# Patient Record
Sex: FEMALE | Race: WHITE | HISPANIC OR LATINO | Employment: UNEMPLOYED | URBAN - METROPOLITAN AREA
[De-identification: names, ages, dates, MRNs, and addresses within clinical notes are randomized per-mention and may not be internally consistent; named-entity substitution may affect disease eponyms.]

---

## 2021-08-25 ENCOUNTER — APPOINTMENT (OUTPATIENT)
Dept: RADIOLOGY | Facility: MEDICAL CENTER | Age: 51
DRG: 064 | End: 2021-08-25
Attending: GENERAL PRACTICE
Payer: MEDICAID

## 2021-08-25 ENCOUNTER — HOSPITAL ENCOUNTER (INPATIENT)
Facility: MEDICAL CENTER | Age: 51
LOS: 5 days | DRG: 064 | End: 2021-08-30
Attending: EMERGENCY MEDICINE | Admitting: INTERNAL MEDICINE
Payer: MEDICAID

## 2021-08-25 ENCOUNTER — APPOINTMENT (OUTPATIENT)
Dept: RADIOLOGY | Facility: MEDICAL CENTER | Age: 51
DRG: 064 | End: 2021-08-25
Attending: INTERNAL MEDICINE
Payer: MEDICAID

## 2021-08-25 ENCOUNTER — APPOINTMENT (OUTPATIENT)
Dept: RADIOLOGY | Facility: MEDICAL CENTER | Age: 51
DRG: 064 | End: 2021-08-25
Attending: EMERGENCY MEDICINE
Payer: MEDICAID

## 2021-08-25 ENCOUNTER — HOSPITAL ENCOUNTER (INPATIENT)
Facility: REHABILITATION | Age: 51
End: 2021-08-25
Attending: PHYSICAL MEDICINE & REHABILITATION | Admitting: PHYSICAL MEDICINE & REHABILITATION
Payer: MEDICAID

## 2021-08-25 ENCOUNTER — APPOINTMENT (OUTPATIENT)
Dept: CARDIOLOGY | Facility: MEDICAL CENTER | Age: 51
DRG: 064 | End: 2021-08-25
Attending: INTERNAL MEDICINE
Payer: MEDICAID

## 2021-08-25 ENCOUNTER — HOSPITAL ENCOUNTER (OUTPATIENT)
Dept: RADIOLOGY | Facility: MEDICAL CENTER | Age: 51
End: 2021-08-25
Payer: MEDICAID

## 2021-08-25 DIAGNOSIS — I61.1 NONTRAUMATIC CORTICAL HEMORRHAGE OF LEFT CEREBRAL HEMISPHERE (HCC): ICD-10-CM

## 2021-08-25 DIAGNOSIS — I61.2 NONTRAUMATIC HEMORRHAGE OF CEREBRAL HEMISPHERE, UNSPECIFIED LATERALITY (HCC): ICD-10-CM

## 2021-08-25 DIAGNOSIS — I16.1 HYPERTENSIVE EMERGENCY: ICD-10-CM

## 2021-08-25 DIAGNOSIS — J96.01 ACUTE RESPIRATORY FAILURE WITH HYPOXIA (HCC): ICD-10-CM

## 2021-08-25 PROBLEM — J81.0 ACUTE PULMONARY EDEMA (HCC): Status: ACTIVE | Noted: 2021-08-25

## 2021-08-25 PROBLEM — N18.9 CHRONIC KIDNEY DISEASE: Status: ACTIVE | Noted: 2021-08-25

## 2021-08-25 PROBLEM — F19.90 DRUG USE: Status: ACTIVE | Noted: 2021-08-25

## 2021-08-25 LAB
AMPHET UR QL SCN: POSITIVE
BARBITURATES UR QL SCN: NEGATIVE
BASE EXCESS BLDA CALC-SCNC: 2 MMOL/L (ref -4–3)
BENZODIAZ UR QL SCN: NEGATIVE
BODY TEMPERATURE: ABNORMAL DEGREES
BREATHS SETTING VENT: 20
BZE UR QL SCN: NEGATIVE
CANNABINOIDS UR QL SCN: POSITIVE
CFT BLD TEG: 5.2 MIN (ref 4.6–9.1)
CFT P HPASE BLD TEG: 4.7 MIN (ref 4.3–8.3)
CHOLEST SERPL-MCNC: 181 MG/DL (ref 100–199)
CLOT ANGLE BLD TEG: 76.3 DEGREES (ref 63–78)
CLOT LYSIS 30M P MA LENFR BLD TEG: 0.5 % (ref 0–2.6)
CO2 BLDA-SCNC: 31 MMOL/L (ref 20–33)
CT.EXTRINSIC BLD ROTEM: 1.1 MIN (ref 0.8–2.1)
DELSYS IDSYS: ABNORMAL
GLUCOSE BLD-MCNC: 102 MG/DL (ref 65–99)
GLUCOSE BLD-MCNC: 137 MG/DL (ref 65–99)
GLUCOSE BLD-MCNC: 98 MG/DL (ref 65–99)
HCO3 BLDA-SCNC: 29.7 MMOL/L (ref 17–25)
HDLC SERPL-MCNC: 82 MG/DL
HOROWITZ INDEX BLDA+IHG-RTO: 320 MM[HG]
LDLC SERPL CALC-MCNC: 84 MG/DL
LV EJECT FRACT  99904: 70
LV EJECT FRACT MOD 2C 99903: 65.3
LV EJECT FRACT MOD 4C 99902: 54.17
LV EJECT FRACT MOD BP 99901: 61.15
MCF BLD TEG: 60.6 MM (ref 52–69)
MCF.PLATELET INHIB BLD ROTEM: 20.7 MM (ref 15–32)
METHADONE UR QL SCN: NEGATIVE
MODE IMODE: ABNORMAL
O2/TOTAL GAS SETTING VFR VENT: 100 %
OPIATES UR QL SCN: NEGATIVE
OXYCODONE UR QL SCN: NEGATIVE
PA AA BLD-ACNC: 97.5 % (ref 0–11)
PA ADP BLD-ACNC: 36.8 % (ref 0–17)
PCO2 BLDA: 56.1 MMHG (ref 26–37)
PCP UR QL SCN: NEGATIVE
PEEP END EXPIRATORY PRESSURE IPEEP: 8 CMH20
PH BLDA: 7.33 [PH] (ref 7.4–7.5)
PO2 BLDA: 320 MMHG (ref 64–87)
PROPOXYPH UR QL SCN: NEGATIVE
SAO2 % BLDA: 100 % (ref 93–99)
SARS-COV+SARS-COV-2 AG RESP QL IA.RAPID: NOTDETECTED
SODIUM SERPL-SCNC: 138 MMOL/L (ref 135–145)
SODIUM SERPL-SCNC: 138 MMOL/L (ref 135–145)
SODIUM SERPL-SCNC: 139 MMOL/L (ref 135–145)
SPECIMEN DRAWN FROM PATIENT: ABNORMAL
SPECIMEN SOURCE: NORMAL
TEG ALGORITHM TGALG: ABNORMAL
TIDAL VOLUME IVT: 350 ML
TRIGL SERPL-MCNC: 77 MG/DL (ref 0–149)

## 2021-08-25 PROCEDURE — A9270 NON-COVERED ITEM OR SERVICE: HCPCS | Performed by: INTERNAL MEDICINE

## 2021-08-25 PROCEDURE — 51702 INSERT TEMP BLADDER CATH: CPT

## 2021-08-25 PROCEDURE — 95816 EEG AWAKE AND DROWSY: CPT | Performed by: PSYCHIATRY & NEUROLOGY

## 2021-08-25 PROCEDURE — 93306 TTE W/DOPPLER COMPLETE: CPT | Mod: 26 | Performed by: INTERNAL MEDICINE

## 2021-08-25 PROCEDURE — 85384 FIBRINOGEN ACTIVITY: CPT

## 2021-08-25 PROCEDURE — 93306 TTE W/DOPPLER COMPLETE: CPT

## 2021-08-25 PROCEDURE — 770022 HCHG ROOM/CARE - ICU (200)

## 2021-08-25 PROCEDURE — 85576 BLOOD PLATELET AGGREGATION: CPT

## 2021-08-25 PROCEDURE — 303105 HCHG CATHETER EXTRA

## 2021-08-25 PROCEDURE — 36620 INSERTION CATHETER ARTERY: CPT | Performed by: NURSE PRACTITIONER

## 2021-08-25 PROCEDURE — 03HY32Z INSERTION OF MONITORING DEVICE INTO UPPER ARTERY, PERCUTANEOUS APPROACH: ICD-10-PCS | Performed by: INTERNAL MEDICINE

## 2021-08-25 PROCEDURE — 99291 CRITICAL CARE FIRST HOUR: CPT | Performed by: INTERNAL MEDICINE

## 2021-08-25 PROCEDURE — 80061 LIPID PANEL: CPT

## 2021-08-25 PROCEDURE — 99291 CRITICAL CARE FIRST HOUR: CPT

## 2021-08-25 PROCEDURE — C1751 CATH, INF, PER/CENT/MIDLINE: HCPCS | Performed by: INTERNAL MEDICINE

## 2021-08-25 PROCEDURE — 304538 HCHG NG TUBE

## 2021-08-25 PROCEDURE — 95816 EEG AWAKE AND DROWSY: CPT | Mod: 26 | Performed by: PSYCHIATRY & NEUROLOGY

## 2021-08-25 PROCEDURE — 94799 UNLISTED PULMONARY SVC/PX: CPT

## 2021-08-25 PROCEDURE — 82962 GLUCOSE BLOOD TEST: CPT

## 2021-08-25 PROCEDURE — C1751 CATH, INF, PER/CENT/MIDLINE: HCPCS

## 2021-08-25 PROCEDURE — 700111 HCHG RX REV CODE 636 W/ 250 OVERRIDE (IP): Performed by: EMERGENCY MEDICINE

## 2021-08-25 PROCEDURE — 99223 1ST HOSP IP/OBS HIGH 75: CPT | Performed by: PHYSICAL MEDICINE & REHABILITATION

## 2021-08-25 PROCEDURE — 87426 SARSCOV CORONAVIRUS AG IA: CPT

## 2021-08-25 PROCEDURE — 700101 HCHG RX REV CODE 250: Performed by: INTERNAL MEDICINE

## 2021-08-25 PROCEDURE — 700102 HCHG RX REV CODE 250 W/ 637 OVERRIDE(OP): Performed by: INTERNAL MEDICINE

## 2021-08-25 PROCEDURE — 82803 BLOOD GASES ANY COMBINATION: CPT

## 2021-08-25 PROCEDURE — 70553 MRI BRAIN STEM W/O & W/DYE: CPT

## 2021-08-25 PROCEDURE — 31500 INSERT EMERGENCY AIRWAY: CPT

## 2021-08-25 PROCEDURE — 71045 X-RAY EXAM CHEST 1 VIEW: CPT

## 2021-08-25 PROCEDURE — 700105 HCHG RX REV CODE 258: Performed by: EMERGENCY MEDICINE

## 2021-08-25 PROCEDURE — 36556 INSERT NON-TUNNEL CV CATH: CPT

## 2021-08-25 PROCEDURE — 700111 HCHG RX REV CODE 636 W/ 250 OVERRIDE (IP)

## 2021-08-25 PROCEDURE — 700105 HCHG RX REV CODE 258: Performed by: INTERNAL MEDICINE

## 2021-08-25 PROCEDURE — 302214 INTUBATION BOX: Performed by: INTERNAL MEDICINE

## 2021-08-25 PROCEDURE — 5A1935Z RESPIRATORY VENTILATION, LESS THAN 24 CONSECUTIVE HOURS: ICD-10-PCS | Performed by: EMERGENCY MEDICINE

## 2021-08-25 PROCEDURE — 700101 HCHG RX REV CODE 250: Performed by: EMERGENCY MEDICINE

## 2021-08-25 PROCEDURE — 99292 CRITICAL CARE ADDL 30 MIN: CPT | Mod: 25,GC | Performed by: INTERNAL MEDICINE

## 2021-08-25 PROCEDURE — 84295 ASSAY OF SERUM SODIUM: CPT | Mod: 91

## 2021-08-25 PROCEDURE — 96365 THER/PROPH/DIAG IV INF INIT: CPT

## 2021-08-25 PROCEDURE — 80307 DRUG TEST PRSMV CHEM ANLYZR: CPT

## 2021-08-25 PROCEDURE — 70450 CT HEAD/BRAIN W/O DYE: CPT

## 2021-08-25 PROCEDURE — 700117 HCHG RX CONTRAST REV CODE 255: Performed by: GENERAL PRACTICE

## 2021-08-25 PROCEDURE — 36600 WITHDRAWAL OF ARTERIAL BLOOD: CPT

## 2021-08-25 PROCEDURE — 94002 VENT MGMT INPAT INIT DAY: CPT

## 2021-08-25 PROCEDURE — A9576 INJ PROHANCE MULTIPACK: HCPCS | Performed by: GENERAL PRACTICE

## 2021-08-25 PROCEDURE — 36620 INSERTION CATHETER ARTERY: CPT

## 2021-08-25 PROCEDURE — 85347 COAGULATION TIME ACTIVATED: CPT

## 2021-08-25 PROCEDURE — 0BH18EZ INSERTION OF ENDOTRACHEAL AIRWAY INTO TRACHEA, VIA NATURAL OR ARTIFICIAL OPENING ENDOSCOPIC: ICD-10-PCS | Performed by: EMERGENCY MEDICINE

## 2021-08-25 PROCEDURE — 700111 HCHG RX REV CODE 636 W/ 250 OVERRIDE (IP): Performed by: INTERNAL MEDICINE

## 2021-08-25 RX ORDER — ACETAMINOPHEN 325 MG/1
650 TABLET ORAL EVERY 4 HOURS PRN
Status: DISCONTINUED | OUTPATIENT
Start: 2021-08-25 | End: 2021-08-28

## 2021-08-25 RX ORDER — HYDRALAZINE HYDROCHLORIDE 20 MG/ML
10 INJECTION INTRAMUSCULAR; INTRAVENOUS EVERY 4 HOURS PRN
Status: DISCONTINUED | OUTPATIENT
Start: 2021-08-25 | End: 2021-08-30 | Stop reason: HOSPADM

## 2021-08-25 RX ORDER — ONDANSETRON 2 MG/ML
4 INJECTION INTRAMUSCULAR; INTRAVENOUS EVERY 4 HOURS PRN
Status: DISCONTINUED | OUTPATIENT
Start: 2021-08-25 | End: 2021-08-28

## 2021-08-25 RX ORDER — POLYETHYLENE GLYCOL 3350 17 G/17G
1 POWDER, FOR SOLUTION ORAL
Status: DISCONTINUED | OUTPATIENT
Start: 2021-08-25 | End: 2021-08-30

## 2021-08-25 RX ORDER — PROMETHAZINE HYDROCHLORIDE 25 MG/1
12.5-25 TABLET ORAL EVERY 4 HOURS PRN
Status: DISCONTINUED | OUTPATIENT
Start: 2021-08-25 | End: 2021-08-25

## 2021-08-25 RX ORDER — FAMOTIDINE 20 MG/1
20 TABLET, FILM COATED ORAL EVERY 12 HOURS
Status: DISCONTINUED | OUTPATIENT
Start: 2021-08-25 | End: 2021-08-27

## 2021-08-25 RX ORDER — PROCHLORPERAZINE EDISYLATE 5 MG/ML
5-10 INJECTION INTRAMUSCULAR; INTRAVENOUS EVERY 4 HOURS PRN
Status: DISCONTINUED | OUTPATIENT
Start: 2021-08-25 | End: 2021-08-30 | Stop reason: HOSPADM

## 2021-08-25 RX ORDER — BISACODYL 10 MG
10 SUPPOSITORY, RECTAL RECTAL
Status: DISCONTINUED | OUTPATIENT
Start: 2021-08-25 | End: 2021-08-30

## 2021-08-25 RX ORDER — ONDANSETRON 4 MG/1
4 TABLET, ORALLY DISINTEGRATING ORAL EVERY 4 HOURS PRN
Status: DISCONTINUED | OUTPATIENT
Start: 2021-08-25 | End: 2021-08-28

## 2021-08-25 RX ORDER — AMOXICILLIN 250 MG
2 CAPSULE ORAL 2 TIMES DAILY
Status: DISCONTINUED | OUTPATIENT
Start: 2021-08-25 | End: 2021-08-30

## 2021-08-25 RX ORDER — SODIUM CHLORIDE 9 MG/ML
INJECTION, SOLUTION INTRAVENOUS CONTINUOUS
Status: DISCONTINUED | OUTPATIENT
Start: 2021-08-25 | End: 2021-08-26

## 2021-08-25 RX ORDER — ACETAMINOPHEN 650 MG/1
650 SUPPOSITORY RECTAL EVERY 4 HOURS PRN
Status: DISCONTINUED | OUTPATIENT
Start: 2021-08-25 | End: 2021-08-28

## 2021-08-25 RX ORDER — PROMETHAZINE HYDROCHLORIDE 25 MG/1
12.5-25 TABLET ORAL EVERY 4 HOURS PRN
Status: DISCONTINUED | OUTPATIENT
Start: 2021-08-25 | End: 2021-08-28

## 2021-08-25 RX ORDER — ENALAPRILAT 1.25 MG/ML
1.25 INJECTION INTRAVENOUS EVERY 6 HOURS PRN
Status: DISCONTINUED | OUTPATIENT
Start: 2021-08-25 | End: 2021-08-27

## 2021-08-25 RX ORDER — DEXTROSE MONOHYDRATE 25 G/50ML
50 INJECTION, SOLUTION INTRAVENOUS
Status: DISCONTINUED | OUTPATIENT
Start: 2021-08-25 | End: 2021-08-26

## 2021-08-25 RX ORDER — LABETALOL HYDROCHLORIDE 5 MG/ML
10 INJECTION, SOLUTION INTRAVENOUS EVERY 4 HOURS PRN
Status: DISCONTINUED | OUTPATIENT
Start: 2021-08-25 | End: 2021-08-30 | Stop reason: HOSPADM

## 2021-08-25 RX ORDER — LORAZEPAM 2 MG/ML
2 INJECTION INTRAMUSCULAR
Status: DISCONTINUED | OUTPATIENT
Start: 2021-08-25 | End: 2021-08-30 | Stop reason: HOSPADM

## 2021-08-25 RX ORDER — PROMETHAZINE HYDROCHLORIDE 25 MG/1
12.5-25 SUPPOSITORY RECTAL EVERY 4 HOURS PRN
Status: DISCONTINUED | OUTPATIENT
Start: 2021-08-25 | End: 2021-08-30 | Stop reason: HOSPADM

## 2021-08-25 RX ADMIN — LABETALOL HYDROCHLORIDE 10 MG: 5 INJECTION INTRAVENOUS at 09:35

## 2021-08-25 RX ADMIN — GADOTERIDOL 17 ML: 279.3 INJECTION, SOLUTION INTRAVENOUS at 21:19

## 2021-08-25 RX ADMIN — FENTANYL CITRATE 100 MCG: 50 INJECTION INTRAMUSCULAR; INTRAVENOUS at 04:35

## 2021-08-25 RX ADMIN — PROPOFOL 60 MCG/KG/MIN: 10 INJECTION, EMULSION INTRAVENOUS at 09:37

## 2021-08-25 RX ADMIN — Medication 100 MCG/HR: at 20:17

## 2021-08-25 RX ADMIN — SODIUM CHLORIDE: 9 INJECTION, SOLUTION INTRAVENOUS at 17:05

## 2021-08-25 RX ADMIN — PROPOFOL 70 MCG/KG/MIN: 10 INJECTION, EMULSION INTRAVENOUS at 03:00

## 2021-08-25 RX ADMIN — PROPOFOL 80 MCG/KG/MIN: 10 INJECTION, EMULSION INTRAVENOUS at 18:36

## 2021-08-25 RX ADMIN — SODIUM CHLORIDE 7.5 MG/HR: 9 INJECTION, SOLUTION INTRAVENOUS at 19:36

## 2021-08-25 RX ADMIN — Medication 100 MCG/HR: at 17:06

## 2021-08-25 RX ADMIN — PROPOFOL 70 MCG/KG/MIN: 10 INJECTION, EMULSION INTRAVENOUS at 06:30

## 2021-08-25 RX ADMIN — SODIUM CHLORIDE 5 MG/HR: 9 INJECTION, SOLUTION INTRAVENOUS at 15:13

## 2021-08-25 RX ADMIN — FENTANYL CITRATE 200 MCG: 50 INJECTION, SOLUTION INTRAMUSCULAR; INTRAVENOUS at 14:27

## 2021-08-25 RX ADMIN — FENTANYL CITRATE 100 MCG: 50 INJECTION INTRAMUSCULAR; INTRAVENOUS at 11:18

## 2021-08-25 RX ADMIN — SODIUM CHLORIDE: 9 INJECTION, SOLUTION INTRAVENOUS at 04:43

## 2021-08-25 RX ADMIN — PROPOFOL 60 MCG/KG/MIN: 10 INJECTION, EMULSION INTRAVENOUS at 04:43

## 2021-08-25 RX ADMIN — SODIUM CHLORIDE 5 MG/HR: 9 INJECTION, SOLUTION INTRAVENOUS at 05:07

## 2021-08-25 RX ADMIN — SODIUM CHLORIDE 15 MG/HR: 9 INJECTION, SOLUTION INTRAVENOUS at 01:30

## 2021-08-25 RX ADMIN — FENTANYL CITRATE 100 MCG: 50 INJECTION INTRAMUSCULAR; INTRAVENOUS at 02:52

## 2021-08-25 RX ADMIN — PROPOFOL 80 MCG/KG/MIN: 10 INJECTION, EMULSION INTRAVENOUS at 02:55

## 2021-08-25 RX ADMIN — HYDRALAZINE HYDROCHLORIDE 10 MG: 20 INJECTION INTRAMUSCULAR; INTRAVENOUS at 10:31

## 2021-08-25 RX ADMIN — FAMOTIDINE 20 MG: 20 TABLET ORAL at 17:08

## 2021-08-25 RX ADMIN — DOCUSATE SODIUM 50 MG AND SENNOSIDES 8.6 MG 2 TABLET: 8.6; 5 TABLET, FILM COATED ORAL at 17:08

## 2021-08-25 RX ADMIN — SODIUM CHLORIDE 5 MG/HR: 9 INJECTION, SOLUTION INTRAVENOUS at 10:40

## 2021-08-25 RX ADMIN — FENTANYL CITRATE 100 MCG: 50 INJECTION INTRAMUSCULAR; INTRAVENOUS at 05:51

## 2021-08-25 RX ADMIN — PROPOFOL 70 MCG/KG/MIN: 10 INJECTION, EMULSION INTRAVENOUS at 12:59

## 2021-08-25 RX ADMIN — PROPOFOL 80 MCG/KG/MIN: 10 INJECTION, EMULSION INTRAVENOUS at 22:13

## 2021-08-25 RX ADMIN — FAMOTIDINE 20 MG: 10 INJECTION INTRAVENOUS at 05:04

## 2021-08-25 RX ADMIN — PROPOFOL 80 MCG/KG/MIN: 10 INJECTION, EMULSION INTRAVENOUS at 15:39

## 2021-08-25 RX ADMIN — PROPOFOL 70 MCG/KG/MIN: 10 INJECTION, EMULSION INTRAVENOUS at 03:38

## 2021-08-25 RX ADMIN — FENTANYL CITRATE 100 MCG: 50 INJECTION INTRAMUSCULAR; INTRAVENOUS at 20:30

## 2021-08-25 RX ADMIN — PROPOFOL 80 MCG/KG/MIN: 10 INJECTION, EMULSION INTRAVENOUS at 20:17

## 2021-08-25 ASSESSMENT — PAIN DESCRIPTION - PAIN TYPE
TYPE: ACUTE PAIN

## 2021-08-25 NOTE — ED NOTES
1251- Time out was called  1251- 100 of propofol was given IV in RAC  1251- 80 of Chuck was given in the RAC. RT at bedside to BVM  1252- 7.5 ET was placed, 23 @ teeth  185/93-BP, 99% BVM, 91-HR  1254- vented on 100% O2  1255- 173/84, 86-HR, 100% vent  1257- 20 of propofol was given   1259-- NG was placed   0120-- propofol was adjusted to 30  0125- nicardipine drip was restarted at 15mg/hr    0111- Time out was called to place Central line   0126-7Fr 20cm central line was palced

## 2021-08-25 NOTE — PROGRESS NOTES
Neurosurgery Progress Note    Subjective:  No acute events overnight.  Deya, daughter, at bedside.  Tech for cardiogram just completed study and in room.    Seen in conjunction with Dr Chanel    Exam:  Intubated. Sedated. Propofol 70mcg/kg/min  Nicardipine 7.5mg/hr-currently paused  2mm PERRL  Pt does not respond to sternal rub. Heavily sedated.         BP  Min: 92/54  Max: 182/97  Pulse  Av.2  Min: 67  Max: 90  Resp  Av.7  Min: 24  Max: 34  Temp  Av.8 °C (96.5 °F)  Min: 35.6 °C (96 °F)  Max: 36.1 °C (97 °F)  Monitored Temp 2  Av.5 °C (95.9 °F)  Min: 35.4 °C (95.7 °F)  Max: 35.6 °C (96.1 °F)  SpO2  Av.7 %  Min: 96 %  Max: 100 %    No data recorded        Recent Labs     219 21  0818   SODIUM 138 138         Recent Labs     21   REACTMIN 5.2   CLOTKINET 1.1   CLOTANGL 76.3   MAXCLOTS 60.6   [id number] 0.5   PRCINADP 36.8*   PRCINAA 97.5*       Intake/Output                       21 - 21 0659 (Not Admitted) 21 - 21 0659      Total  Total                 Intake    I.V.  --  359.7 359.7  390.1  -- 390.1    Cardene Volume -- 322.5 322.5 -- -- --    Propofol Volume -- 37.2 37.2 127.4 -- 127.4    Volume (mL) (NS infusion) -- 0 0 262.7 -- 262.7    NG/GT  --  0 0  --  -- --    Intake (mL) (Enteral Tube 21 Cortrak - Gastric Right nare) -- 0 0 -- -- --    Total Intake -- 359.7 359.7 390.1 -- 390.1       Output    Drains  --  0 0  --  -- --    Residual Amount (mL) (Discarded) (Enteral Tube 21 Cortrak - Gastric Right nare) -- 0 0 -- -- --    Other  --  -- --  100  -- 100    Other -- -- -- 100 -- 100    Emesis/NG output  --  0 0  --  -- --    Output (mL) (Enteral Tube 21 Cortrak - Gastric Right nare) -- 0 0 -- -- --    Total Output -- 0 0 100 -- 100       Net I/O     -- 359.7 359.7 290.1 -- 290.1            Intake/Output Summary (Last 24 hours) at 2021 0953  Last data filed at  8/25/2021 0800  Gross per 24 hour   Intake 749.85 ml   Output 100 ml   Net 649.85 ml            • famotidine  20 mg Q12HRS    Or   • famotidine  20 mg Q12HRS   • senna-docusate  2 Tablet BID    And   • polyethylene glycol/lytes  1 Packet QDAY PRN    And   • magnesium hydroxide  30 mL QDAY PRN    And   • bisacodyl  10 mg QDAY PRN   • lidocaine  2 mL Q30 MIN PRN   • Pharmacy  1 Each PHARMACY TO DOSE   • fentaNYL  100 mcg Q15 MIN PRN    And   • fentaNYL  200 mcg Q15 MIN PRN    And   • fentaNYL   Continuous    And   • propofol  0-80 mcg/kg/min Continuous   • insulin regular  1-6 Units Q6HRS    And   • glucose  16 g Q15 MIN PRN    And   • dextrose 50%  50 mL Q15 MIN PRN   • Respiratory Therapy Consult   Continuous RT   • NS   Continuous   • LORazepam  2 mg Q5 MIN PRN   • acetaminophen  650 mg Q4HRS PRN    Or   • acetaminophen  650 mg Q4HRS PRN   • ondansetron  4 mg Q4HRS PRN    Or   • ondansetron  4 mg Q4HRS PRN   • MD Alert...Adult ICU Electrolyte Replacement per Pharmacy   PHARMACY TO DOSE   • niCARdipine infusion  0-15 mg/hr Continuous   • labetalol  10 mg Q4HRS PRN   • hydrALAZINE  10 mg Q4HRS PRN   • enalaprilat  1.25 mg Q6HRS PRN   • promethazine  12.5-25 mg Q4HRS PRN   • promethazine  12.5-25 mg Q4HRS PRN   • prochlorperazine  5-10 mg Q4HRS PRN       Assessment and Plan:  Hospital day #1  POD #na/  Prophylactic anticoagulation: no         Start date/time: TBD    Plan:  MRI brain w/wo pending  EEG pending  Dr Chanel discussed with Dr Bella  Wean vent as tolerated  No ASA or anticoagulants  Continue Keppra  Keep SBP less than 140

## 2021-08-25 NOTE — CONSULTS
Physical Medicine and Rehabilitation Consultation              Date of initial consultation: 8/25/2021  Requesting provider: Jeremy Gonda, MD   Consulting provider: Mi Issa D.O.  Reason for consultation: assess for acute inpatient rehab appropriateness  LOS: 0 Day(s)    Chief complaint: Acute onset of slurred speech and right-sided facial droop    HPI: The patient is a 51 y.o.  female with a past medical history of hypertension and CKD;  who presented on 8/25/2021 12:40 AM as a transfer from [facility] emergency department for an intracranial hemorrhage.  Per documentation, [facility] with a sudden onset of slurred speech, right-sided facial droop and decreased sensation of the right upper and lower extremities that started approximately 1 hour prior to arrival to the emergency department.  At time of evaluation in the emergency department patient is found to have an NIH SS 6 and code stroke was called.  She is also noted to have a blood pressure of 250/130 and UDS positive for amphetamines and THC.  A CT of the head was obtained which revealed the patient had a left parietal intraparenchymal hemorrhage and was transferred to [facility] for higher level of care.  Per documentation patient was not on any blood thinners in the outpatient setting and did not have any preceding trauma.  In the emergency department at [facility] patient was able to protect her airway, patient was subsequently intubated and started on a nicardipine drip.  Neurosurgery was consulted, patient was started on seizure prophylaxis, EEG was ordered, and MRI brain is pending.  Neurology has been consulted.  Patient remains intubated and sedated, therapy eval's have not been completed at this time.    Full review of systems unable to be obtained, patient is intubated and sedated.    Social Hx:  Unable to to determine patient's living situation, patient is intubated  and sedated, daughter is not at bedside   unknown DANILO  At prior level of function patient was independent with mobility and ADLs       Tobacco: Per records patient has a history of tobacco use  Alcohol: Unknown at this time  Drugs: Positive amphetamine and THC    THERAPY:  Restrictions: Currently on bedrest  PT: Functional mobility   PT eval pending    OT: ADLs  OT eval pending    SLP:   SLP eval pending    IMAGIN/25 CT head  FINDINGS:     Multiloculated parenchymal hemorrhage is identified in the left temporal parietal area measuring 2.7 x 5.1 cm. Small amount of surrounding edema is noted. No new parenchymal hemorrhage is identified. No extra-axial hemorrhage is identified. There is   minimal central midline shift to the right side measuring approximately 2 mm. New areas of brain swelling or edema are identified.    PROCEDURES:  None    PMH:  No past medical history on file.    PSH:  No past surgical history on file.    FHX:  No family history on file.    Medications:  Current Facility-Administered Medications   Medication Dose   • famotidine (PEPCID) tablet 20 mg  20 mg    Or   • famotidine (PEPCID) injection 20 mg  20 mg   • senna-docusate (PERICOLACE or SENOKOT S) 8.6-50 MG per tablet 2 Tablet  2 Tablet    And   • polyethylene glycol/lytes (MIRALAX) PACKET 1 Packet  1 Packet    And   • magnesium hydroxide (MILK OF MAGNESIA) suspension 30 mL  30 mL    And   • bisacodyl (DULCOLAX) suppository 10 mg  10 mg   • lidocaine (XYLOCAINE) 1 % injection 2 mL  2 mL   • Pharmacy Consult: Enteral tube insertion - review meds/change route/product selection  1 Each   • fentaNYL (SUBLIMAZE) injection 100 mcg  100 mcg    And   • fentaNYL (SUBLIMAZE) injection 200 mcg  200 mcg    And   • fentaNYL (SUBLIMAZE) 50 mcg/mL in 50mL (Continuous Infusion)      And   • propofol (DIPRIVAN) injection  0-80 mcg/kg/min   • insulin regular (HumuLIN R,NovoLIN R) injection  1-6 Units    And   • glucose 4 g chewable tablet 16 g  16 g    And  "  • dextrose 50% (D50W) injection 50 mL  50 mL   • Respiratory Therapy Consult     • NS infusion     • LORazepam (ATIVAN) injection 2 mg  2 mg   • acetaminophen (Tylenol) tablet 650 mg  650 mg    Or   • acetaminophen (TYLENOL) suppository 650 mg  650 mg   • ondansetron (ZOFRAN ODT) dispertab 4 mg  4 mg    Or   • ondansetron (ZOFRAN) syringe/vial injection 4 mg  4 mg   • MD Alert...ICU Electrolyte Replacement per Pharmacy     • niCARdipine (CARDENE) 25 mg in  mL Infusion  0-15 mg/hr   • labetalol (NORMODYNE/TRANDATE) injection 10 mg  10 mg   • hydrALAZINE (APRESOLINE) injection 10 mg  10 mg   • enalaprilat (VASOTEC) injection 1.25 mg  1.25 mg   • promethazine (PHENERGAN) tablet 12.5-25 mg  12.5-25 mg   • promethazine (PHENERGAN) suppository 12.5-25 mg  12.5-25 mg   • prochlorperazine (COMPAZINE) injection 5-10 mg  5-10 mg       Allergies:  Not on File      Physical Exam:  Vitals: /59   Pulse 69   Temp 36.1 °C (97 °F) (Temporal)   Resp (!) 24   Ht 1.702 m (5' 7\")   Wt 81 kg (178 lb 9.2 oz)   SpO2 100%   Gen: NAD, lying in ICU bed, intermittmently distressed and raising head up off of pillow  Head:  NC/AT  Eyes/ Nose/ Mouth: PERRLA, moist mucous membranes  Cardio: RRR, good distal perfusion, warm extremities  Pulm: Patient is intubated,  no cyanosis     Abd: Soft NTND, negative borborygmi   Ext: No peripheral edema. No calf tenderness. No clubbing.    Mental status: confused, and agitated, soft restraints in place to prevent pulling at lines   Speech: fluent, no aphasia or dysarthria    CRANIAL NERVES:  2,3: visual acuity grossly intact, PERRL  3,4,6: EOMI bilaterally, no nystagmus or diplopia  5: sensation intact to light touch bilaterally and symmetric  7: no facial asymmetry      Motor: unable to perform full MMT due to confusion, sediation, and agitation  Patient moves LLE freely at least 3/5 for HF, KE, DF, PF   Patient moves RLE freely, at least 3/5 HF   Patient does not move RUE, 0/5 " EF  Patient attempts to move LUE at least 3/5 EF, EE, limited by soft restraints.     Negative Pronator drift bilaterally     Sensory:   Intact sensation to LUE, follows commands to squeeze fingers     DTRs:  No clonus at bilateral ankles  Negative babinski b/l  Negative Chatman b/l     Tone: no spasticity noted, no cogwheeling noted    Coordination: unable to assist due to sedation and agitation     Labs: Reviewed and significant for   No results for input(s): RBC, HEMOGLOBIN, HEMATOCRIT, PLATELETCT, PROTHROMBTM, APTT, INR, IRON, FERRITIN, TOTIRONBC in the last 72 hours.  Recent Labs     08/25/21  0219 08/25/21  0818   SODIUM 138 138     Recent Results (from the past 24 hour(s))   POCT arterial blood gas device results    Collection Time: 08/25/21  1:52 AM   Result Value Ref Range    Ph 7.331 (L) 7.400 - 7.500    Pco2 56.1 (HH) 26.0 - 37.0 mmHg    Po2 320 (H) 64 - 87 mmHg    Tco2 31 20 - 33 mmol/L    S02 100 (H) 93 - 99 %    Hco3 29.7 (H) 17.0 - 25.0 mmol/L    BE 2 -4 - 3 mmol/L    Body Temp see below degrees    O2 Therapy 100 %    iPF Ratio 320     Specimen Arterial     DelSys Vent     Tidal Volume 350 mL    Peep End Expiratory Pressure 8 cmh20    Set Rate 20     Mode APV-CMV    Lipid Profile - Fasting    Collection Time: 08/25/21  2:19 AM   Result Value Ref Range    Cholesterol,Tot 181 100 - 199 mg/dL    Triglycerides 77 0 - 149 mg/dL    HDL 82 >=40 mg/dL    LDL 84 <100 mg/dL   Sodium Serum (NA)    Collection Time: 08/25/21  2:19 AM   Result Value Ref Range    Sodium 138 135 - 145 mmol/L   Platelet Mapping with Basic TEG    Collection Time: 08/25/21  2:19 AM   Result Value Ref Range    Reaction Time Initial-R 5.2 4.6 - 9.1 min    React Time Initial Hep 4.7 4.3 - 8.3 min    Clot Kinetics-K 1.1 0.8 - 2.1 min    Clot Angle-Angle 76.3 63.0 - 78.0 degrees    Maximum Clot Strength-MA 60.6 52.0 - 69.0 mm    TEG Functional Fibrinogen(MA) 20.7 15.0 - 32.0 mm    Lysis 30 minutes-LY30 0.5 0.0 - 2.6 %    % Inhibition ADP  36.8 (H) 0.0 - 17.0 %    % Inhibition AA 97.5 (H) 0.0 - 11.0 %    TEG Algorithm Link Algorithm    URINE DRUG SCREEN    Collection Time: 08/25/21  3:33 AM   Result Value Ref Range    Amphetamines Urine Positive (A) Negative    Barbiturates Negative Negative    Benzodiazepines Negative Negative    Cocaine Metabolite Negative Negative    Methadone Negative Negative    Opiates Negative Negative    Oxycodone Negative Negative    Phencyclidine -Pcp Negative Negative    Propoxyphene Negative Negative    Cannabinoid Metab Positive (A) Negative   POCT glucose device results    Collection Time: 08/25/21  4:47 AM   Result Value Ref Range    Glucose - Accu-Ck 137 (H) 65 - 99 mg/dL   SARS-CoV-2 Antigen BISMARK    Collection Time: 08/25/21  4:50 AM   Result Value Ref Range    SARS-CoV-2 Source Nasal Swab     SARS-COV ANTIGEN BISMARK NotDetected Not-Detected   Sodium Serum (NA)    Collection Time: 08/25/21  8:18 AM   Result Value Ref Range    Sodium 138 135 - 145 mmol/L         ASSESSMENT:  Patient is a 51 y.o. female admitted with right-sided weakness secondary to left temporal parietal IPH    IGC Code / Diagnosis to Support: 0002.1 - Brain Dysfunction: Non-Traumatic    Additional Recommendations:  Left temporal parietal intraparenchymal hemorrhage  -Greatest deficits are acute onset right-sided weakness and respiratory distress requiring intubation  -Patient with questionable seizure activity, patient remains intubated and sedated, neurology following  -Patient is on seizure prophylaxis with Keppra  - PT/OT evals pending     Agitation   - patient continues to be agitated with with propofol sedation  - given recent history of amphetamine use,   - monitor for signs/symptoms of alcohol withdrawal, as current alcohol use unknown, consideration for precedex     Disposition  -Patient's current functional status status post left temporal parietal lobe is impaired at this time due to sedation and intubation.  -Therapy eval's to be completed  when patient is more stable and able to participate, given patient's age and diagnosis significant potential for patient to be a good candidate for IRF therapy, however will need home set up and therapy evals to determine rehab appropriateness   - PM&R to continue to follow.     Medical Complexity:  Hypertension  CKD       DVT PPX: SCDs      Thank you for allowing us to participate in the care of this patient.     Patient was seen for 111 minutes on unit/floor of which > 50% of time was spent on counseling and coordination of care,  (including attending ICU rounds)  regarding the above, including prognosis, risk reduction, benefits of treatment, and options for next stage of care.    Mi Issa D.O.   Physical Medicine and Rehabilitation     Please note that this dictation was created using voice recognition software. I have made every reasonable attempt to correct obvious errors, but there may be errors of grammar and possibly content that I did not discover before finalizing the note.

## 2021-08-25 NOTE — ED TRIAGE NOTES
Pt comes from [facility]. Pt comes with WILMA and an RN from . Pt comes in on a nicardipine drip maxed out at 15. Per neuro would like to keep BP below 160, 140 is the best. CT confirmed bleed. 18G RAC and 20G LAC were placed at . Pt has a HX of brain tumor and HTN. Pt presented to  with right side facial droop and expressive aphasis.  Pt was Alert and trying to talking when arrived pt mouth was foaming at the mouth and ERP did intubate pt on arrival due to protect air way.

## 2021-08-25 NOTE — ASSESSMENT & PLAN NOTE
Extubated 8/26/21.  -stopped Propofol  -wean Precedex  -versed an Fentanyl for agitation and sedation  -wean off HFNC

## 2021-08-25 NOTE — ASSESSMENT & PLAN NOTE
Amphetamines and THC positive on urine drug screen  Additional information to be gathered from family  Eventual drug cessation education to be provided when appropriate

## 2021-08-25 NOTE — ASSESSMENT & PLAN NOTE
Likely hypertension induced with right-sided deficits vs AV fistula vs underlying mass versus amyloid. Echo reveals: EF 70%, no significant valve abnormalities. No evidence of right to left shunting with bubble study with no identified cardioembolic source.  -MRI Brain demonstrates:  40 mm acute parenchymal hemorrhage at the left posterior frontal convexity. No abnormal vascular flow voids or underlying enhancing mass lesion. Encephalomalacic change in the left frontal deep white matter along the corona radiata and far posterior aspect of the left head of caudate nucleus consistent with old infarction.  -EEG:No seizures captured    No ASA or anticoagulants  No need for keppra  Keep SBP less than 140  Follow up with Neurology with MRI Brain +/- contrast  in 6 weeks   Keep eunatremic  Neurosurgery signed off

## 2021-08-25 NOTE — DISCHARGE PLANNING
[facility] Transitional Care Coordination    Referral from:  Dr. Gonda  Insurance Provider on Facesheet: Medicaid FFS  Potential Rehab Diagnosis:  ICH    Chart review indicates patient has on going medical management and may have therapy needs to possibly meet inpatient rehab facility criteria with the goal of returning to community.    D/C support:  To be determined     Physiatry to consult.  ICH.      Last Covid test:  8/25/2021 not detected     Thank you for the referral.

## 2021-08-25 NOTE — DIETARY
"Nutrition Support Assessment   Day 0 of admit.  Heide Rangel is a 51 y.o. female with admitting DX of Intracranial hemorrhage.     Current problem list:  1. Nontraumatic cortical hemorrhage of left cerebral hemisphere  2. Chronic kidney disease  3. Drug use  4. Acute pulmonary edema  5. Hypertensive emergency  6. Acute respiratory failure with hypoxia     Assessment:  Estimated Nutritional Needs: based on:   Height: 170.2 cm (5' 7\")  Weight: 87.8 kg (193 lb 9 oz)  Weight to Use in Calculations: 87.8 kg (193 lb 9 oz)  Ideal Body Weight: 61.2 kg (135 lb)  Percent Ideal Body Weight: 143.4  Body mass index is 30.32 kg/m²., BMI classification: Class 1 Obesity    Calculation/Equation: REE with MSJ x 1.1 = 1685 kcal, PSU (Ve 8.4, Tmax 36.1) = 1549 kcal.   Total Calories / day: 1549 - 1685 (Calories / k.6 - 19.1)  Total Grams Protein / day: 92 - 122 (Grams Protein / kg IBW: 1.5 - 2)     Evaluation:   1. Pt intubated on vent. Enteral protocol initiated.  2. Cortrak placed to proximal duodenum.  3. Labs  include Na 138, Triglycerides 77. Accu-check glucose 137 (H). Limited labs available to assess.  4. Medications include Pepcid, SSI, Senna, Cardene at 7.5 mg/hour, Propofol at 29.2 ml/hour providing 771 kcal in 24 hours.  5. No pressure injuries per 4 eyes skin assessment.  6. LBM PTA  7. High protein, peptide based formula Peptamen Intense VHP appropriate for pt sedated on propofol.     Malnutrition Risk: Unable to fully assess.     Recommendations/Plan:  1. Start Peptamen Intense VHP and advance as tolerated to goal rate of 40 ml/hour while on propofol to provide 960 kcal, 88 gm protein, and 806 ml free water in 24 hours.  2. Once off propofol, change tube feed to Impact Peptide 1.5 goal rate of 45 ml/hour to provide 1620 kcal, 101 gm protein, and 831 ml free water in 24 hours.  3. Fluids per MD.  4. Monitor weight.    RD following                "

## 2021-08-25 NOTE — THERAPY
Missed Therapy     Patient Name: Heide Rangel  Age:  51 y.o., Sex:  female  Medical Record #: [id number]  Today's Date: 8/25/2021 08/25/21 0951   Interdisciplinary Plan of Care Collaboration   IDT Collaboration with  Nursing;Physical Therapist   Collaboration Comments OT consult received, pt just admitted to floor, intubated and sedated at this time. Will initiate OT POC tomorrow as appropriate.

## 2021-08-25 NOTE — THERAPY
Missed Therapy     Patient Name: Heide Rangel  Age:  51 y.o., Sex:  female  Medical Record #: [id number]  Today's Date: 8/25/2021    PT consult received. Per RN, bedrest order currently in chart though can be discontinued. Reports pt is sedated at this time. PT will hold and evaluate once pt daiana to participate on OOB activity with therapy.

## 2021-08-25 NOTE — ED PROVIDER NOTES
"ED Provider Note    CHIEF COMPLAINT  Chief Complaint   Patient presents with   • Possible Stroke       HPI    Primary care provider: Unknown  Means of arrival: EMS Transfer  History obtained from: Transferring ER physician, patient, medics  History limited by: Patient altered on arrival    Heide Rangel is a 51 y.o. female who presents with concerns for intracranial hemorrhage.  The patient presented with dysarthria at [facility] 2 hours ago, she had about an hour of headache and slurred speech, found to have intraparenchymal hemorrhage.  Transferred here for higher level of critical and neurosurgical care.  No prior episodes.  No history of trauma.  No blood thinners apparently.    Further history limited patient is acutely altered and in severe distress.    REVIEW OF SYSTEMS  Constitutional: Negative for fever.  HENT: Positive for headache.    Further ROS limited due to patient's critical condition.    PAST MEDICAL HISTORY  No reported past medical history.    PAST FAMILY HISTORY  No pertinent past family history.    SOCIAL HISTORY  Unknown cannot obtain patient critically ill.    SURGICAL HISTORY  patient denies any surgical history    CURRENT MEDICATIONS  No daily meds.    ALLERGIES  Not on File    PHYSICAL EXAM  VITAL SIGNS: /70   Pulse 88   Temp 36.1 °C (97 °F) (Temporal)   Resp 19   Ht 1.702 m (5' 7\")   Wt 87.8 kg (193 lb 9 oz)   SpO2 99%   BMI 30.32 kg/m²    Pulse ox interpretation: On room air, I interpret this pulse ox as normal.  Constitutional: Well-developed, well-nourished but lying on the stretcher in severe distress.  HEENT: Normocephalic, atraumatic.  Dysarthric, drooling, not protecting airway.  Eyes:  EOMI. Normal sclerae.  Neck: Supple, nontender.  Chest/Pulmonary: Quite diminished to ausculation bilaterally, no wheezes or rhonchi.  Cardiovascular: Regular rate and rhythm, no murmur.  Bounding radial pulses.  Abdomen: Soft, nontender; no rebound, guarding, or " masses.  Back: No CVA or midline tenderness.   Musculoskeletal: No deformity or edema.  Neuro: Slurred speech, sensation intact.  Psych: Distressed but cooperative.  Skin: No rashes, warm and dry.      DIAGNOSTIC STUDIES / PROCEDURES    LABS & EKG  Results for orders placed or performed during the hospital encounter of 08/25/21   EC-ECHOCARDIOGRAM COMPLETE W/O CONT   Result Value Ref Range    Eject.Frac. MOD BP 61.15     Eject.Frac. MOD 4C 54.17     Eject.Frac. MOD 2C 65.3     Left Ventrical Ejection Fraction 70    Lipid Profile - Fasting   Result Value Ref Range    Cholesterol,Tot 181 100 - 199 mg/dL    Triglycerides 77 0 - 149 mg/dL    HDL 82 >=40 mg/dL    LDL 84 <100 mg/dL   Sodium Serum (NA)   Result Value Ref Range    Sodium 138 135 - 145 mmol/L   Platelet Mapping with Basic TEG   Result Value Ref Range    Reaction Time Initial-R 5.2 4.6 - 9.1 min    React Time Initial Hep 4.7 4.3 - 8.3 min    Clot Kinetics-K 1.1 0.8 - 2.1 min    Clot Angle-Angle 76.3 63.0 - 78.0 degrees    Maximum Clot Strength-MA 60.6 52.0 - 69.0 mm    TEG Functional Fibrinogen(MA) 20.7 15.0 - 32.0 mm    Lysis 30 minutes-LY30 0.5 0.0 - 2.6 %    % Inhibition ADP 36.8 (H) 0.0 - 17.0 %    % Inhibition AA 97.5 (H) 0.0 - 11.0 %    TEG Algorithm Link Algorithm    URINE DRUG SCREEN   Result Value Ref Range    Amphetamines Urine Positive (A) Negative    Barbiturates Negative Negative    Benzodiazepines Negative Negative    Cocaine Metabolite Negative Negative    Methadone Negative Negative    Opiates Negative Negative    Oxycodone Negative Negative    Phencyclidine -Pcp Negative Negative    Propoxyphene Negative Negative    Cannabinoid Metab Positive (A) Negative   Sodium Serum (NA)   Result Value Ref Range    Sodium 138 135 - 145 mmol/L   SARS-CoV-2 Antigen BISMARK   Result Value Ref Range    SARS-CoV-2 Source Nasal Swab     SARS-COV ANTIGEN BISMARK NotDetected Not-Detected   Sodium Serum (NA)   Result Value Ref Range    Sodium 139 135 - 145 mmol/L   Sodium  Serum (NA)   Result Value Ref Range    Sodium 138 135 - 145 mmol/L   CRP Quantitive (Non-Cardiac)   Result Value Ref Range    Stat C-Reactive Protein 4.20 (H) 0.00 - 0.75 mg/dL   POCT arterial blood gas device results   Result Value Ref Range    Ph 7.331 (L) 7.400 - 7.500    Pco2 56.1 (HH) 26.0 - 37.0 mmHg    Po2 320 (H) 64 - 87 mmHg    Tco2 31 20 - 33 mmol/L    S02 100 (H) 93 - 99 %    Hco3 29.7 (H) 17.0 - 25.0 mmol/L    BE 2 -4 - 3 mmol/L    Body Temp see below degrees    O2 Therapy 100 %    iPF Ratio 320     Specimen Arterial     DelSys Vent     Tidal Volume 350 mL    Peep End Expiratory Pressure 8 cmh20    Set Rate 20     Mode APV-CMV    POCT glucose device results   Result Value Ref Range    Glucose - Accu-Ck 137 (H) 65 - 99 mg/dL   POCT glucose device results   Result Value Ref Range    Glucose - Accu-Ck 102 (H) 65 - 99 mg/dL   POCT glucose device results   Result Value Ref Range    Glucose - Accu-Ck 98 65 - 99 mg/dL   POCT glucose device results   Result Value Ref Range    Glucose - Accu-Ck 84 65 - 99 mg/dL         RADIOLOGY  DX-CHEST-PORTABLE (1 VIEW)   Final Result      1.  Line and tube position as described above. No visible pneumothorax.   2.  LEFT basilar atelectasis or pneumonia      MR-BRAIN-WITH & W/O   Final Result      1.  Approximately 40 mm acute parenchymal hemorrhage at the left posterior frontal convexity. No abnormal vascular flow voids or underlying enhancing mass lesion.   2.  Encephalomalacic change in the left frontal deep white matter along the corona radiata and far posterior aspect of the left head of caudate nucleus consistent with old infarction.   3.  Moderate supratentorial white matter disease most consistent with microvascular ischemic change.   4.  Incidental 9 mm meningioma at the floor of the left middle cranial fossa.   5.  Old lacunar size infarct in the left paramedian hans.   6.  Mild-moderate pontine ischemic gliosis.      EC-ECHOCARDIOGRAM COMPLETE W/O CONT   Final  Result      DX-ABDOMEN FOR TUBE PLACEMENT   Final Result      Feeding tube extends below the diaphragm with tip at the level of the proximal duodenum.      DX-CHEST-LIMITED (1 VIEW)   Final Result         1.  Line and tube placements are noted as described above.      2.  No pneumothorax.      3.  Mild perihilar congestion.      4.  No consolidations identified.      CT-HEAD W/O   Final Result         1.  No significant change in left-sided parenchymal hemorrhage since prior CT.      2.  Minimal midline shift to the right side.      3.  No new intracranial hemorrhage is identified.         OUTSIDE IMAGES-CT HEAD   Final Result      CT-HEAD W/O    (Results Pending)       PROCEDURES  Intubation Procedure Note    Indication: airway protection    Consent: Unable to be obtained due to the emergent nature of this procedure.    Medications Used: propofol intravenously and rocuronium intravenously    Procedure: The patient was placed in the appropriate position.  Cricoid pressure was not required.  Intubation was performed video laryngoscopy using a glidescope a 7.5 cuffed endotracheal tube.  The cuff was then inflated and the tube was secured appropriately at a distance of 24 cm to the dental ridge.  Initial confirmation of placement included bilateral breath sounds, an end tidal CO2 detector, absence of sounds over the stomach, tube fogging, adequate chest rise, adequate pulse oximetry reading and improved skin color.  A chest x-ray to verify correct placement of the tube showed appropriate tube position.    The patient tolerated the procedure well.     Complications: None      Arterial Line Placement Procedure Note                     Indication: cardiovascular instability    Consent: Unable to be obtained due to the emergent nature of this procedure.    Mcihael's Test: Normal    Procedure: The skin over the right radial artery was prepped with betadine and draped in a sterile fashion and draped in a sterile fashion.   Local anesthesia was not performed due to the emergent nature of this procedure.  A 20 gauge arterial line catheter was then inserted, using a modified Seldinger technique, into the vessel.  Unfortunately, was unable to properly cannulate the vessel.  Catheter removed.  The site was then dressed in a sterile fashion.    The patient tolerated the procedure well.     Complications: None      COURSE & MEDICAL DECISION MAKING    This is a 51 y.o. female who presents with intracranial hemorrhage, spontaneous atraumatic.    Differential Diagnosis includes but is not limited to:  Hypertensive urgency/emergency, coagulopathy, tumor, aneurysm    ED Course:  Unfortunate 51-year-old female with above complaint.  Garbled speech on arrival, plan immediate airway protection, intubated see procedure note.  Nicardipine infusing.  Intensivist Dr. Gonda consulted, and neurosurgeon Dr. Chanel also consulted, they will kindly evaluate the patient.  No indication for emergent reversal of any anticoagulants, blood pressure well controlled with nicardipine and propofol.  Patient stable on ventilator.  Unfortunately could not obtain arterial access but getting reliable cuff pressures.  Patient stable for admission to the ICU in critical condition.    Upon my evaluation, this patient had a high probability of imminent or life-threatening deterioration due to intracranial hemorrhage, respiratory failure.     I personally provided 40 minutes of total critical care time outside of time spent on separately billable/documented procedures. This required my direct attention, intervention, and management which included the following:  -review of laboratory data  -review of radiology studies  -discussion with consultants: Intensivist, respiratory therapist, neurosurgeon, transferring ER physician  -monitoring for potential decompensation with serial bedside reassessments  -Vent management  -Blood pressure control with nicardipine  infusion    Medications   famotidine (PEPCID) tablet 20 mg (20 mg Enteral Tube Given 8/25/21 1708)     Or   famotidine (PEPCID) injection 20 mg ( Intravenous See Alternative 8/25/21 1708)   senna-docusate (PERICOLACE or SENOKOT S) 8.6-50 MG per tablet 2 Tablet (2 Tablets Enteral Tube Given 8/25/21 1708)     And   polyethylene glycol/lytes (MIRALAX) PACKET 1 Packet (has no administration in time range)     And   magnesium hydroxide (MILK OF MAGNESIA) suspension 30 mL (has no administration in time range)     And   bisacodyl (DULCOLAX) suppository 10 mg (has no administration in time range)   lidocaine (XYLOCAINE) 1 % injection 2 mL (has no administration in time range)   Pharmacy Consult: Enteral tube insertion - review meds/change route/product selection (has no administration in time range)   fentaNYL (SUBLIMAZE) injection 100 mcg (100 mcg Intravenous Given 8/25/21 2030)     And   fentaNYL (SUBLIMAZE) injection 200 mcg (200 mcg Intravenous Return to ADS 8/25/21 2024)     And   fentaNYL (SUBLIMAZE) 50 mcg/mL in 50mL (Continuous Infusion) ( Intravenous Canceled Entry 8/25/21 2024)     And   propofol (DIPRIVAN) injection (60 mcg/kg/min × 81 kg Intravenous Rate Change 8/26/21 0244)   insulin regular (HumuLIN R,NovoLIN R) injection (0 Units Subcutaneous Dose not Required 8/26/21 0005)     And   glucose 4 g chewable tablet 16 g (has no administration in time range)     And   dextrose 50% (D50W) injection 50 mL (has no administration in time range)   Respiratory Therapy Consult (has no administration in time range)   NS infusion ( Intravenous New Bag 8/25/21 1705)   LORazepam (ATIVAN) injection 2 mg (has no administration in time range)   acetaminophen (Tylenol) tablet 650 mg (650 mg Enteral Tube Given 8/26/21 0232)     Or   acetaminophen (TYLENOL) suppository 650 mg ( Rectal See Alternative 8/26/21 0232)   ondansetron (ZOFRAN ODT) dispertab 4 mg (has no administration in time range)     Or   ondansetron (ZOFRAN)  syringe/vial injection 4 mg (has no administration in time range)   MD Alert...ICU Electrolyte Replacement per Pharmacy (has no administration in time range)   niCARdipine (CARDENE) 25 mg in  mL Infusion (7.5 mg/hr Intravenous New Bag 8/25/21 1936)   labetalol (NORMODYNE/TRANDATE) injection 10 mg (10 mg Intravenous Given 8/25/21 0935)   hydrALAZINE (APRESOLINE) injection 10 mg (10 mg Intravenous Given 8/25/21 1031)   enalaprilat (VASOTEC) injection 1.25 mg (has no administration in time range)   promethazine (PHENERGAN) suppository 12.5-25 mg (has no administration in time range)   prochlorperazine (COMPAZINE) injection 5-10 mg (has no administration in time range)   promethazine (PHENERGAN) tablet 12.5-25 mg (has no administration in time range)   gadoteridol (PROHANCE) injection 17 mL (17 mL Intravenous Given 8/25/21 2119)       FINAL IMPRESSION  1. Nontraumatic hemorrhage of cerebral hemisphere, unspecified laterality (HCC)    2. Acute respiratory failure with hypoxia (HCC)    3. Hypertensive emergency    4. Nontraumatic cortical hemorrhage of left cerebral hemisphere (HCC)        -ADMIT-      Pertinent Labs & Imaging studies reviewed and verified by myself, as well as nursing notes and the patient's past medical, family, and social histories (See chart for details).    Portions of this record were made with voice recognition software.  Despite my review, spelling/grammar/context errors may still remain.  Interpretation of this chart should be taken in this context.    Electronically signed by Louis Caruso M.D. on 8/26/2021 at 2:54 AM.

## 2021-08-25 NOTE — RESPIRATORY CARE
Problem: Ventilation  Goal: Ability to achieve and maintain unassisted ventilation or tolerate decreased levels of ventilator support  Description: Document on Vent flowsheet    1.  Support and monitor invasive and noninvasive mechanical ventilation  2.  Monitor ventilator weaning response  3.  Perform ventilator associated pneumonia prevention interventions  4.  Manage ventilation therapy by monitoring diagnostic test results  Outcome: Progressing                                   Ventilator Daily Summary     Vent Day # 1     7.5 @ 23     Ventilator settings: APVCMV 24/350/8/50%     Plan: Continue current ventilator settings and wean mechanical ventilation as tolerated per physician orders.

## 2021-08-25 NOTE — DISCHARGE PLANNING
This RN CM received update from bedside RN that Pt's daughter is requesting to talk to TINA/CM.     RN CM met with Pt's daughter, Deya, at the bedside. Deya states she just spoke to a DA investigator regarding her mother's case. Deya unsure of details at this time as she is very overwhelmed. Deya requesting this RN LUCRETIA to contact her mother-in-law (pt's best friend) Maday Dodd ([phone]) for more details about what is needed from CM.     MASSIMO BOYKIN called Maday at the number above. Maday states that Pt was a victim of crime and missed her court day that she was subpoenaed for. Per Maday, Pt now has a bench warrant out Maday states the court hearing was supposed to take place in [place] but Pt was just planning to attend through video conference. Maday requesting a letter be compiled to excuse her from court due to her illness. Maday provided email address for [email or web address]. Maday states that Pt was visited by two DA officers last night at her home and Pt has been very upset over it. Maday also states that the two DA officers visited Pt's daughter today as well.     RN CM received update from Kayleigh, bedside RN that DA officers tried to come to the bedside to talk to the pt as well. Pt however is intubated and unable to speak. Per thomas Victor requesting to be notified when Pt is discharged. Kayleigh states investigators were updated that this notification cannot be provided by [facility]. RN CM confirmed that [facility] can not notify law enforcement of pt's discharge, per [facility].       RN CM received verbal consent from Deya over the phone to provide letter to the [employer] investigator. Deya also requesting letter be emailed to her at [email or web address]. Letter emailed to investigator and Deya.     6511 Addendum: This RN CM received a call from ARMAND Darby (O: [phone], C: [phone]). Lazarus states he received the letter that  "was emailed to him and wanted to clarify that Pt did not miss a court hearing but has a \"material witness warrant\" for her arrest. [facility] Pt had been stating that she will not attend the court trial and that is why there is a warrant. [facility] trial is not scheduled yet, but likely will not be until October. [facility] he was informed by Pt's family that she had a stroke, therefore the DA does not intend on arresting the Pt should she d/c from the hospital. No Pt information provided to Georgian.   "

## 2021-08-25 NOTE — PROCEDURES
VIDEO ELECTROENCEPHALOGRAM REPORT      Referring provider: Dr. Bella.     DOS: 8/25/2021 (total recording of 28 minutes).     INDICATION:  Heide Rangel 51 y.o. female presenting with dysarthria, facial droop.     CURRENT ANTIEPILEPTIC REGIMEN: Sedated with Propofol, which was paused for the study.     TECHNIQUE: 30 channel video electroencephalogram (EEG) was performed in accordance with the international 10-20 system. The study was reviewed in bipolar and referential montages. The recording examined the patient during wakeful and drowsy state(s).     DESCRIPTION OF THE RECORD:  During the wakefulness, the background showed a symmetrical 10 Hz alpha activity posteriorly with amplitude of 70 mV.  There was reactivity to eye closure/opening.  A normal anterior-posterior gradient was noted with faster beta frequencies seen anteriorly.  During drowsiness, theta/delta frequencies were seen.    ACTIVATION PROCEDURES:   Intermittent Photic stimulation was performed in a stepwise fashion from 1 to 30 Hz and elicited a normal response (photic driving), most noticeable in the posterior leads.      ICTAL AND/OR INTERICTAL FINDINGS:   No focal or generalized epileptiform activity noted. Intermittent left frontotemporal slowing noted.  No clinical events or seizures were reported or recorded during the study.     EKG: sampling of the EKG recording demonstrated sinus rhythm.     EVENTS:  None.     INTERPRETATION:  This is an abnormal video EEG recording in the awake and drowsy state(s).  Intermittent left frontotemporal slowing noted, suggestive of an underlying structural abnormality. No seizures captured during the study. Clinical and radiological correlation is recommended.    Note: This EEG does not rule out epilepsy.  If the clinical suspicion remains high for seizures, a prolonged recording to capture clinical or subclinical events may be helpful.        Tej Mckeon MD   Epilepsy and Neurodiagnostics.    Clinical  of Neurology [facility].   Diplomate in Neurology, Epilepsy, and Electrodiagnostic Medicine.   Office: [phone]  Fax: [phone]

## 2021-08-25 NOTE — ED NOTES
Unable to obtain Med Rec, Pt intubated at this time.   Unable to assess allergies.  Unknown Home Pharmacy.

## 2021-08-25 NOTE — CONSULTS
Critical Care Consultation    Date of consult: 8/25/2021    Referring Physician  Dr. Louis Caruso    Reason for Consultation  ICH, resp failure    History of Presenting Illness  51 y.o. female who presented 8/25/2021 with a past medical history significant for hypertension and chronic kidney disease who was transferred from [facility] ER for intracranial hemorrhage. She presented to the ED there with sudden onset of slurred speech, right facial droop and decreased sensation in the right upper and lower extremities that started proximally an hour and a half before arrival. She was found to have an NIH stroke scale of 6 and a code stroke was called. She was also noted to have a blood pressure of 250/130. On CT imaging, patient was found to have a left parietal intraparenchymal hemorrhage and was transferred to [facility] for higher level of care. She apparently does not take any blood thinners and did not have any preceding trauma. Upon arrival to the ED, patient was unable to protect her airway but per the ER physician, was moving the left side of her body, dysarthric and following intermittent commands but gurgling and foaming at the mouth. She was intubated and started on a nicardipine drip and I was consulted for critical care management. Neurosurgery has been consulted as well.    Code Status  Full Code    Review of Systems  Review of Systems   Unable to perform ROS: Intubated     Past Medical History   has no past medical history on file. -Hypertension, chronic kidney disease, seasonal allergies    Surgical History   has no past surgical history on file. -Unknown    Family History  family history is not on file. -Unknown    Social History   Per the transfer records, patient has a history of tobacco use as well as marijuana and no alcohol use.    Medications  Home Medications     Reviewed by Gaurav Hillman (Pharmacy Tech) on 08/25/21 at 0159  Med List Status: Unable to Obtain    Medication Last Dose Status        Patient Oscar Taking any Medications                    *Outpatient medications include fluticasone, cetirizine, albuterol    Current Facility-Administered Medications   Medication Dose Route Frequency Provider Last Rate Last Admin   • famotidine (PEPCID) tablet 20 mg  20 mg Enteral Tube Q12HRS Jeremy M Gonda, M.D.        Or   • famotidine (PEPCID) injection 20 mg  20 mg Intravenous Q12HRS Jeremy M Gonda, M.D.       • senna-docusate (PERICOLACE or SENOKOT S) 8.6-50 MG per tablet 2 Tablet  2 Tablet Enteral Tube BID Jeremy M Gonda, M.D.        And   • polyethylene glycol/lytes (MIRALAX) PACKET 1 Packet  1 Packet Enteral Tube QDAY PRN Jeremy M Gonda, M.D.        And   • magnesium hydroxide (MILK OF MAGNESIA) suspension 30 mL  30 mL Enteral Tube QDAY PRN Jeremy M Gonda, M.D.        And   • bisacodyl (DULCOLAX) suppository 10 mg  10 mg Rectal QDAY PRN Jeremy M Gonda, M.D.       • lidocaine (XYLOCAINE) 1 % injection 2 mL  2 mL Tracheal Tube Q30 MIN PRN Jeremy M Gonda, M.D.       • Pharmacy Consult: Enteral tube insertion - review meds/change route/product selection  1 Each Other PHARMACY TO DOSE Jeremy M Gonda, M.D.       • fentaNYL (SUBLIMAZE) injection 100 mcg  100 mcg Intravenous Q15 MIN PRN Jeremy M Gonda, M.D.   100 mcg at 08/25/21 0252    And   • fentaNYL (SUBLIMAZE) injection 200 mcg  200 mcg Intravenous Q15 MIN PRN Jeremy M Gonda, M.D.        And   • fentaNYL (SUBLIMAZE) 50 mcg/mL in 50mL (Continuous Infusion)   Intravenous Continuous Jeremy M Gonda, M.D.   Dose not Required at 08/25/21 0130    And   • propofol (DIPRIVAN) injection  0-80 mcg/kg/min Intravenous Continuous Jeremy M Gonda, M.D.       • insulin regular (HumuLIN R,NovoLIN R) injection  1-6 Units Subcutaneous Q6HRS Jeremy M Gonda, M.D.        And   • glucose 4 g chewable tablet 16 g  16 g Oral Q15 MIN PRN Jeremy M Gonda, M.D.        And   • dextrose 50% (D50W) injection 50 mL  50 mL Intravenous Q15 MIN PRN Jerod GAYTAN  Gonda, M.D.       • propofol (DIPRIVAN) injection  0-80 mcg/kg/min Intravenous Continuous Louis Caruso M.D. 34 mL/hr at 08/25/21 0338 70 mcg/kg/min at 08/25/21 0338   • Respiratory Therapy Consult   Nebulization Continuous RT Jeremy M Gonda, M.D.       • NS infusion   Intravenous Continuous Jeremy M Gonda, M.D.       • LORazepam (ATIVAN) injection 2 mg  2 mg Intravenous Q5 MIN PRN Jeremy M Gonda, M.D.       • acetaminophen (Tylenol) tablet 650 mg  650 mg Enteral Tube Q4HRS PRN Jeremy M Gonda, M.D.        Or   • acetaminophen (TYLENOL) suppository 650 mg  650 mg Rectal Q4HRS PRN Jeremy M Gonda, M.D.       • ondansetron (ZOFRAN ODT) dispertab 4 mg  4 mg Enteral Tube Q4HRS PRN Jeremy M Gonda, M.D.        Or   • ondansetron (ZOFRAN) syringe/vial injection 4 mg  4 mg Intravenous Q4HRS PRN Jeremy M Gonda, M.D.       • MD Alert...ICU Electrolyte Replacement per Pharmacy   Other PHARMACY TO DOSE Jeremy M Gonda, M.D.       • niCARdipine (CARDENE) 25 mg in  mL Infusion  0-15 mg/hr Intravenous Continuous Jeremy M Gonda, M.D.   Stopped at 08/25/21 0339   • labetalol (NORMODYNE/TRANDATE) injection 10 mg  10 mg Intravenous Q4HRS PRN Jeremy M Gonda, M.D.       • hydrALAZINE (APRESOLINE) injection 10 mg  10 mg Intravenous Q4HRS PRN Jeremy M Gonda, M.D.       • enalaprilat (VASOTEC) injection 1.25 mg  1.25 mg Intravenous Q6HRS PRN Jeremy M Gonda, M.D.       • promethazine (PHENERGAN) tablet 12.5-25 mg  12.5-25 mg Oral Q4HRS PRN Jeremy M Gonda, M.D.       • promethazine (PHENERGAN) suppository 12.5-25 mg  12.5-25 mg Rectal Q4HRS PRN Jeremy M Gonda, M.D.       • prochlorperazine (COMPAZINE) injection 5-10 mg  5-10 mg Intravenous Q4HRS PRN Jeremy M Gonda, M.D.           Allergies  Not on File    Vital Signs last 24 hours  Pulse:  [86-90] 87  Resp:  [24-34] 31  BP: (143-182)/(75-97) 143/75  SpO2:  [96 %-99 %] 96 %    Physical Exam  Physical Exam  Vitals and nursing note reviewed.   Constitutional:       General: She is in  acute distress.      Appearance: She is normal weight. She is ill-appearing.      Interventions: She is sedated, intubated and restrained.      Comments: Recently intubated, paralyzed and sedated   HENT:      Head: Normocephalic.      Nose: Nose normal. No congestion.      Mouth/Throat:      Mouth: Mucous membranes are moist.      Pharynx: Oropharynx is clear.      Comments: Endotracheal tube in position, orogastric tube  Eyes:      General: No scleral icterus.     Conjunctiva/sclera: Conjunctivae normal.      Pupils: Pupils are equal, round, and reactive to light.      Comments: Pupils are 3 mm bilaterally   Neck:      Comments: Newly placed right IJ central venous catheter without surrounding hematoma  Cardiovascular:      Rate and Rhythm: Regular rhythm. Tachycardia present. Occasional extrasystoles are present.     Chest Wall: PMI is displaced.      Pulses: Normal pulses.      Heart sounds: Normal heart sounds. No murmur heard.        Comments: Hypertensive  Pulmonary:      Effort: No accessory muscle usage. She is intubated.      Breath sounds: Examination of the right-lower field reveals rhonchi. Examination of the left-lower field reveals rhonchi. Rhonchi present. No wheezing or rales.      Comments: Good compliance, tolerating ventilator without dyssynchrony  Abdominal:      General: Bowel sounds are normal. There is no distension.      Palpations: Abdomen is soft.      Tenderness: There is no abdominal tenderness. There is no guarding.   Genitourinary:     Comments: Dai catheter in place  Musculoskeletal:         General: No tenderness.      Cervical back: Neck supple. No rigidity.      Right lower leg: No edema.      Left lower leg: No edema.   Skin:     General: Skin is warm and dry.      Capillary Refill: Capillary refill takes less than 2 seconds.      Coloration: Skin is not pale.   Neurological:      Comments: Heavily sedated and recently paralyzed   Psychiatric:      Comments: Unable to assess  given current clinical condition         Fluids  No intake or output data in the 24 hours ending 08/25/21 0422    Laboratory  Recent Results (from the past 48 hour(s))   POCT arterial blood gas device results    Collection Time: 08/25/21  1:52 AM   Result Value Ref Range    Ph 7.331 (L) 7.400 - 7.500    Pco2 56.1 (HH) 26.0 - 37.0 mmHg    Po2 320 (H) 64 - 87 mmHg    Tco2 31 20 - 33 mmol/L    S02 100 (H) 93 - 99 %    Hco3 29.7 (H) 17.0 - 25.0 mmol/L    BE 2 -4 - 3 mmol/L    Body Temp see below degrees    O2 Therapy 100 %    iPF Ratio 320     Specimen Arterial     DelSys Vent     Tidal Volume 350 mL    Peep End Expiratory Pressure 8 cmh20    Set Rate 20     Mode APV-CMV    Lipid Profile - Fasting    Collection Time: 08/25/21  2:19 AM   Result Value Ref Range    Cholesterol,Tot 181 100 - 199 mg/dL    Triglycerides 77 0 - 149 mg/dL    HDL 82 >=40 mg/dL    LDL 84 <100 mg/dL   Sodium Serum (NA)    Collection Time: 08/25/21  2:19 AM   Result Value Ref Range    Sodium 138 135 - 145 mmol/L   Platelet Mapping with Basic TEG    Collection Time: 08/25/21  2:19 AM   Result Value Ref Range    Reaction Time Initial-R 5.2 4.6 - 9.1 min    React Time Initial Hep 4.7 4.3 - 8.3 min    Clot Kinetics-K 1.1 0.8 - 2.1 min    Clot Angle-Angle 76.3 63.0 - 78.0 degrees    Maximum Clot Strength-MA 60.6 52.0 - 69.0 mm    TEG Functional Fibrinogen(MA) 20.7 15.0 - 32.0 mm    Lysis 30 minutes-LY30 0.5 0.0 - 2.6 %    % Inhibition ADP 36.8 (H) 0.0 - 17.0 %    % Inhibition AA 97.5 (H) 0.0 - 11.0 %    TEG Algorithm Link Algorithm    URINE DRUG SCREEN    Collection Time: 08/25/21  3:33 AM   Result Value Ref Range    Amphetamines Urine Positive (A) Negative    Barbiturates Negative Negative    Benzodiazepines Negative Negative    Cocaine Metabolite Negative Negative    Methadone Negative Negative    Opiates Negative Negative    Oxycodone Negative Negative    Phencyclidine -Pcp Negative Negative    Propoxyphene Negative Negative    Cannabinoid Metab  Positive (A) Negative       Imaging  DX-CHEST-LIMITED (1 VIEW)   Final Result         1.  Line and tube placements are noted as described above.      2.  No pneumothorax.      3.  Mild perihilar congestion.      4.  No consolidations identified.      CT-HEAD W/O   Final Result         1.  No significant change in left-sided parenchymal hemorrhage since prior CT.      2.  Minimal midline shift to the right side.      3.  No new intracranial hemorrhage is identified.         OUTSIDE IMAGES-CT HEAD   Final Result      EC-ECHOCARDIOGRAM COMPLETE W/O CONT    (Results Pending)    *Personally reviewed CT image from outside hospital showing left parietal hemorrhage   *Personally reviewed chest x-ray showing enlarged cardiac silhouette with mild pulmonary vascular edema, cervical hardware, endotracheal tube/gastric tube/right IJ central venous catheter in good position    Assessment/Plan  * Nontraumatic cortical hemorrhage of left cerebral hemisphere (HCC)  Assessment & Plan  Likely hypertension induced with right-sided deficits vs AV fistula vs underlying mass versus amyloid  Neurosurgical consultation, neurology consultation in the morning  Strict blood pressure management  Avoid anticoagulants, check TEG/platelet mapping  Repeat head CT  PT/OT/SLP eval    Hypertensive emergency  Assessment & Plan  Contributive to intracranial hemorrhage  Titrate nicardipine drip for goal SBP less than 180  As needed labetalol, hydralazine  Echocardiography    Acute respiratory failure with hypoxia (HCC)  Assessment & Plan  Intubated in ED for airway protection 8/25  Continue full mechanical ventilatory support, titrate FiO2 to goal SPO2 greater than 92%  RT/O2 protocol  Propofol and fentanyl drips for as needed analgesia/sedation  ABCDEF bundle    Chronic kidney disease  Assessment & Plan  Check BMP  Avoid nephrotoxins  Monitor creatinine, urine output, electrolytes closely    Acute pulmonary edema (HCC)  Assessment & Plan  Likely  neurogenic as well as secondary to hypertensive emergency  Echocardiogram  Diuresis  Continue positive pressure ventilation    Drug use  Assessment & Plan  Amphetamines and THC positive on urine drug screen  Additional information to be gathered from family  Eventual drug cessation education to be provided when appropriate      Discussed patient condition and risk of morbidity and/or mortality with RN, RT, Pharmacy, Charge nurse / hot rounds, Patient, neurosurgery and Emergency physician.    The patient remains critically ill.  Critical care time = 42 minutes in directly providing and coordinating critical care and extensive data review.  No time overlap and excludes procedures.

## 2021-08-25 NOTE — PROGRESS NOTES
50 yo female with 2 cm left superficial hyperdensity frontalparietal ni midline shift basilar cisterns patent.  Given small size of ich would not expect to result in significant loc unless seizure.Recommend keppra, stst eeg icu observation.  MRI w wo contrast given pts age lesion location.

## 2021-08-25 NOTE — PROGRESS NOTES
"[facility] ICU Progress Note      Admit Date: 8/25/2021    Resident(s): Giovani Hogan Jr., M.D.  Attending: BRADLEY COLORADO/ Dr. Bella    Date & Time:   8/25/2021   10:09 AM       Patient ID:    Name:             Heide Rangel     YOB: 1970  Age:                 51 y.o.  female   MRN:               [id number]         Hospital Course: 51 year old female w/ history of HTN,CKD, transfer from [facility], [place] for intracranial hemorrhage. Presented with dysarthria, R facial droop, decreased sensation right side with /130. CT found left parietal intraparenchymal hemorrhage. Intubated, on Nicardipine drip. SBP goal <180.     Consultants:  Critical Care  Neurosurgery    Interval Events:  Overnight since admission, no acute events. BP downtrending. Received right radial arterial line. Seen by Neurosurgery today, that recommends MRI Brain w/ contrast and EEG. Neurology referral also placed. Echo shows EF 70% with no significant valvular abnormalities and no evidence of intracardiac shunting on bubble study.    ROS Unable to perform ROS: Intubated     PHYSICAL EXAM  Vitals:    08/25/21 0600 08/25/21 0700 08/25/21 0702 08/25/21 0800   BP: 156/90 106/59  115/65   Pulse: 70 67 69 72   Resp: (!) 24 (!) 24 (!) 24 (!) 24   Temp:       TempSrc: Temporal      SpO2: 99% 100% 100% 100%   Weight:       Height:         Body mass index is 27.97 kg/m².  /65   Pulse 72   Temp 36.1 °C (97 °F) (Temporal)   Resp (!) 24   Ht 1.702 m (5' 7\")   Wt 81 kg (178 lb 9.2 oz)   SpO2 100%   BMI 27.97 kg/m²   O2 therapy: Pulse Oximetry: 100 %, O2 Delivery Device: Ventilator    Physical Exam   Vitals and nursing note reviewed.   Constitutional:       General: lying in bed intubated     Appearance: She is normal weight. She is ill-appearing.      Interventions: She is sedated, intubated and restrained.       HENT:      Head: Normocephalic.      Nose: Nose normal. No congestion.      Mouth/Throat:      Mouth: Mucous membranes are moist. "      Pharynx: Oropharynx is clear.      Comments: Endotracheal tube in position, orogastric tube  Eyes:      General: No scleral icterus.     Conjunctiva/sclera: Conjunctivae normal.      Pupils: Pupils are equal, round, and reactive to light.   Neck:      Comments:R IJ line: clean/dry/intact  Cardiovascular:      Rate and Rhythm: Regular rate and rhythm       Pulses: Normal pulses.      Heart sounds: Normal heart sounds. No murmur heard.         Pulmonary:      Effort: No accessory muscle usage. She is intubated.      Breath sounds: mild rhonchi otherwise no wheezing or rales.     Abdominal:      General: Bowel sounds are normal. There is no distension.      Palpations: Abdomen is soft.      Tenderness: There is no abdominal tenderness. There is no guarding.   Genitourinary:     Comments: Dai catheter in place-yellowish urine  Musculoskeletal:         General: No tenderness.      Cervical back: Neck supple. No rigidity.      Right lower leg: No edema.      Left lower leg: No edema.   Skin:     General: Skin is warm and dry.      Capillary Refill: Capillary refill takes less than 2 seconds.      Coloration: Skin is not pale.   Neurological:      Comments: Heavily sedated and recently paralyzed            Respiratory:  Vent Mode: APVCMV  Respiration: (!) 24, Pulse Oximetry: 100 %   APVCMV 24/350/8/50%    Chest Tube Drains:    Recent Labs     08/25/21  0152   ISTATAPH 7.331*   ISTATAPCO2 56.1*   ISTATAPO2 320*   ISTATATCO2 31   [id number] 100*   ISTATARTHCO3 29.7*   ISTATARTBE 2   ISTATTEMP see below   ISTATFIO2 100   ISTATSPEC Arterial       HemoDynamics:  Pulse: 72 Blood Pressure: 115/65, Arterial BP: 129/70            Fluids:  Date 08/25/21 0700 - 08/26/21 0659   Shift 7827-3010 3412-0000 5145-9945 24 Hour Total   INTAKE   I.V. 390.1   390.1     Propofol Volume 127.4   127.4     Volume (mL) (NS infusion) 262.7   262.7   Shift Total 390.1   390.1   OUTPUT   Other 100   100     Other 100   100   Shift Total 100    100   .1   290.1      No intake or output data in the 24 hours ending 21 05    Weight: 81 kg (178 lb 9.2 oz)  Body mass index is 27.97 kg/m².    Recent Labs     219 21  0818   SODIUM 138 138       GI/Nutrition:  No results for input(s): ALTSGPT, ASTSGOT, ALKPHOSPHAT, TBILIRUBIN, DBILIRUBIN, GAMMAGT, AMYLASE, LIPASE, ALB, PREALBUMIN, GLUCOSE in the last 72 hours.    Heme:  No results for input(s): RBC, HEMOGLOBIN, HEMATOCRIT, PLATELETCT, PROTHROMBTM, APTT, INR, IRON, FERRITIN, TOTIRONBC in the last 72 hours.    Infectious Disease:  Monitored Temp 2  Av.5 °C (95.9 °F)  Min: 35.4 °C (95.7 °F)  Max: 35.6 °C (96.1 °F)  Temp  Av.8 °C (96.5 °F)  Min: 35.6 °C (96 °F)  Max: 36.1 °C (97 °F)        Meds:  • famotidine  20 mg      Or   • famotidine  20 mg     • senna-docusate  2 Tablet      And   • polyethylene glycol/lytes  1 Packet      And   • magnesium hydroxide  30 mL      And   • bisacodyl  10 mg     • lidocaine  2 mL     • Pharmacy  1 Each     • fentaNYL  100 mcg      And   • fentaNYL  200 mcg      And   • fentaNYL   Stopped (21 0130)    And   • propofol  0-80 mcg/kg/min 60 mcg/kg/min (21 0937)   • insulin regular  1-6 Units      And   • glucose  16 g      And   • dextrose 50%  50 mL     • Respiratory Therapy Consult       • NS   80 mL/hr at 21 0443   • LORazepam  2 mg     • acetaminophen  650 mg      Or   • acetaminophen  650 mg     • ondansetron  4 mg      Or   • ondansetron  4 mg     • MD Alert...Adult ICU Electrolyte Replacement per Pharmacy       • niCARdipine infusion  0-15 mg/hr Stopped (21 0630)   • labetalol  10 mg     • hydrALAZINE  10 mg     • enalaprilat  1.25 mg     • promethazine  12.5-25 mg     • promethazine  12.5-25 mg     • prochlorperazine  5-10 mg              Imaging:  EC-ECHOCARDIOGRAM COMPLETE W/O CONT   Final Result      DX-ABDOMEN FOR TUBE PLACEMENT   Final Result      Feeding tube extends below the diaphragm with tip at the level  of the proximal duodenum.      DX-CHEST-LIMITED (1 VIEW)   Final Result         1.  Line and tube placements are noted as described above.      2.  No pneumothorax.      3.  Mild perihilar congestion.      4.  No consolidations identified.      CT-HEAD W/O   Final Result         1.  No significant change in left-sided parenchymal hemorrhage since prior CT.      2.  Minimal midline shift to the right side.      3.  No new intracranial hemorrhage is identified.         OUTSIDE IMAGES-CT HEAD   Final Result      MR-BRAIN-WITH & W/O    (Results Pending)       Assessment and Plan:      * Nontraumatic cortical hemorrhage of left cerebral hemisphere (HCC)  Assessment & Plan  Likely hypertension induced with right-sided deficits vs AV fistula vs underlying mass versus amyloid. Echo reveals: EF 70%, no significant valve abnormalities. No evidence of right to left shunting with bubble study with no identified cardioembolic source.  -BP goal <160/90-at goal  -Avoid anticoagulants  -Repeat head CT stable  -PT/OT/SLP eval  -Neurosurgery recommends MRI Brain w/ contrast, EEG  -Neurology consult placed  -appreciate Neurosurgery and Neurology recommendations      Chronic kidney disease  Assessment & Plan  Check BMP  Avoid nephrotoxins  Monitor creatinine, urine output, electrolytes closely    Drug use  Assessment & Plan  Amphetamines and THC positive on urine drug screen  Additional information to be gathered from family  Eventual drug cessation education to be provided when appropriate    Acute pulmonary edema (HCC)  Assessment & Plan  Likely neurogenic as well as secondary to hypertensive emergency  Echocardiogram  Diuresis  Continue positive pressure ventilation    Hypertensive emergency  Assessment & Plan  Improved.  -Titrate nicardipine drip for goal SBP less than 180  -As needed labetalol, hydralazine  -Pending Echocardiography    Acute respiratory failure with hypoxia (HCC)  Assessment & Plan  Vent Day #1APVCMV  24/350/8/50%  Intubated in ED for airway protection 8/25  Continue full mechanical ventilatory support, titrate FiO2 to goal SPO2 greater than 92%  RT/O2 protocol  Propofol and fentanyl drips for as needed analgesia/sedation  ABCDEF bundle      DISPO: ICU    CODE STATUS: Full     Quality Measures:  Dai Catheter: yes  DVT Prophylaxis: SCD  Ulcer Prophylaxis: Famotidine  Antibiotics: none  Lines: R IJ, PIV, right radial arterial line

## 2021-08-25 NOTE — PROCEDURES
"Arterial Line Insertion    Date/Time: 8/25/2021 4:30 AM  Performed by: JAMES Calloway.  Authorized by: THOMAS Calloway   Consent: The procedure was performed in an emergent situation.  Risks and benefits: risks, benefits and alternatives were discussed  Time out: Immediately prior to procedure a \"time out\" was called to verify the correct patient, procedure, equipment, support staff and site/side marked as required.  Preparation: Patient was prepped and draped in the usual sterile fashion.  Indications: multiple ABGs, respiratory failure and hemodynamic monitoring  Location: right radial  Anesthesia: local infiltration    Anesthesia:  Local Anesthetic: lidocaine 1% without epinephrine    Sedation:  Patient sedated: yes  Sedation type: anxiolysis  Sedatives: propofol and fentanyl  Vitals: Vital signs were monitored during sedation.    Michael's test normal: yes  Needle gauge: 20  Seldinger technique: Seldinger technique used  Number of attempts: 3  Post-procedure: line sutured and dressing applied  Post-procedure CMS: normal  Patient tolerance: patient tolerated the procedure well with no immediate complications              "

## 2021-08-25 NOTE — ASSESSMENT & PLAN NOTE
Resolved  -goal <140/90  -stopped Nicardipine drip  -continue Amlodipine 10mg home dose  -hold home Toprol XL  -start Metoprolol tartrate 12.5mg BID  -PRN labetalol, hydralazine, Vasotec  -CTM

## 2021-08-26 ENCOUNTER — APPOINTMENT (OUTPATIENT)
Dept: RADIOLOGY | Facility: MEDICAL CENTER | Age: 51
DRG: 064 | End: 2021-08-26
Attending: INTERNAL MEDICINE
Payer: MEDICAID

## 2021-08-26 LAB
ANION GAP SERPL CALC-SCNC: 11 MMOL/L (ref 7–16)
BASE EXCESS BLDA CALC-SCNC: -3 MMOL/L (ref -4–3)
BASOPHILS # BLD AUTO: 0.3 % (ref 0–1.8)
BASOPHILS # BLD: 0.04 K/UL (ref 0–0.12)
BODY TEMPERATURE: ABNORMAL DEGREES
BREATHS SETTING VENT: 24
BUN SERPL-MCNC: 15 MG/DL (ref 8–22)
CALCIUM SERPL-MCNC: 7.9 MG/DL (ref 8.5–10.5)
CHLORIDE SERPL-SCNC: 106 MMOL/L (ref 96–112)
CO2 BLDA-SCNC: 23 MMOL/L (ref 20–33)
CO2 SERPL-SCNC: 23 MMOL/L (ref 20–33)
CREAT SERPL-MCNC: 0.94 MG/DL (ref 0.5–1.4)
CRP SERPL HS-MCNC: 4.2 MG/DL (ref 0–0.75)
DELSYS IDSYS: ABNORMAL
END TIDAL CARBON DIOXIDE IECO2: 37 MMHG
EOSINOPHIL # BLD AUTO: 0.27 K/UL (ref 0–0.51)
EOSINOPHIL NFR BLD: 2 % (ref 0–6.9)
ERYTHROCYTE [DISTWIDTH] IN BLOOD BY AUTOMATED COUNT: 46.4 FL (ref 35.9–50)
GLUCOSE BLD-MCNC: 105 MG/DL (ref 65–99)
GLUCOSE BLD-MCNC: 84 MG/DL (ref 65–99)
GLUCOSE BLD-MCNC: 97 MG/DL (ref 65–99)
GLUCOSE SERPL-MCNC: 108 MG/DL (ref 65–99)
HCO3 BLDA-SCNC: 21.8 MMOL/L (ref 17–25)
HCT VFR BLD AUTO: 38.6 % (ref 37–47)
HGB BLD-MCNC: 12.9 G/DL (ref 12–16)
HOROWITZ INDEX BLDA+IHG-RTO: 175 MM[HG]
IMM GRANULOCYTES # BLD AUTO: 0.08 K/UL (ref 0–0.11)
IMM GRANULOCYTES NFR BLD AUTO: 0.6 % (ref 0–0.9)
LYMPHOCYTES # BLD AUTO: 1.11 K/UL (ref 1–4.8)
LYMPHOCYTES NFR BLD: 8.4 % (ref 22–41)
MAGNESIUM SERPL-MCNC: 1.7 MG/DL (ref 1.5–2.5)
MCH RBC QN AUTO: 28.3 PG (ref 27–33)
MCHC RBC AUTO-ENTMCNC: 33.4 G/DL (ref 33.6–35)
MCV RBC AUTO: 84.6 FL (ref 81.4–97.8)
MODE IMODE: ABNORMAL
MONOCYTES # BLD AUTO: 0.61 K/UL (ref 0–0.85)
MONOCYTES NFR BLD AUTO: 4.6 % (ref 0–13.4)
NEUTROPHILS # BLD AUTO: 11.15 K/UL (ref 2–7.15)
NEUTROPHILS NFR BLD: 84.1 % (ref 44–72)
NRBC # BLD AUTO: 0 K/UL
NRBC BLD-RTO: 0 /100 WBC
O2/TOTAL GAS SETTING VFR VENT: 40 %
PCO2 BLDA: 37.5 MMHG (ref 26–37)
PCO2 TEMP ADJ BLDA: 38.8 MMHG (ref 26–37)
PEEP END EXPIRATORY PRESSURE IPEEP: 8 CMH20
PH BLDA: 7.37 [PH] (ref 7.4–7.5)
PH TEMP ADJ BLDA: 7.36 [PH] (ref 7.4–7.5)
PHOSPHATE SERPL-MCNC: 3.2 MG/DL (ref 2.5–4.5)
PLATELET # BLD AUTO: 189 K/UL (ref 164–446)
PMV BLD AUTO: 9.8 FL (ref 9–12.9)
PO2 BLDA: 70 MMHG (ref 64–87)
PO2 TEMP ADJ BLDA: 73 MMHG (ref 64–87)
POTASSIUM SERPL-SCNC: 2.9 MMOL/L (ref 3.6–5.5)
PREALB SERPL-MCNC: 17.4 MG/DL (ref 18–38)
RBC # BLD AUTO: 4.56 M/UL (ref 4.2–5.4)
SAO2 % BLDA: 93 % (ref 93–99)
SODIUM SERPL-SCNC: 138 MMOL/L (ref 135–145)
SODIUM SERPL-SCNC: 138 MMOL/L (ref 135–145)
SODIUM SERPL-SCNC: 140 MMOL/L (ref 135–145)
SPECIMEN DRAWN FROM PATIENT: ABNORMAL
TIDAL VOLUME IVT: 350 ML
WBC # BLD AUTO: 13.3 K/UL (ref 4.8–10.8)

## 2021-08-26 PROCEDURE — 700101 HCHG RX REV CODE 250: Performed by: INTERNAL MEDICINE

## 2021-08-26 PROCEDURE — 86140 C-REACTIVE PROTEIN: CPT

## 2021-08-26 PROCEDURE — 84100 ASSAY OF PHOSPHORUS: CPT

## 2021-08-26 PROCEDURE — 700102 HCHG RX REV CODE 250 W/ 637 OVERRIDE(OP): Performed by: GENERAL PRACTICE

## 2021-08-26 PROCEDURE — 94640 AIRWAY INHALATION TREATMENT: CPT

## 2021-08-26 PROCEDURE — 770022 HCHG ROOM/CARE - ICU (200)

## 2021-08-26 PROCEDURE — 700105 HCHG RX REV CODE 258: Performed by: INTERNAL MEDICINE

## 2021-08-26 PROCEDURE — 700102 HCHG RX REV CODE 250 W/ 637 OVERRIDE(OP): Performed by: INTERNAL MEDICINE

## 2021-08-26 PROCEDURE — 99221 1ST HOSP IP/OBS SF/LOW 40: CPT | Performed by: PSYCHIATRY & NEUROLOGY

## 2021-08-26 PROCEDURE — 82962 GLUCOSE BLOOD TEST: CPT

## 2021-08-26 PROCEDURE — 80048 BASIC METABOLIC PNL TOTAL CA: CPT

## 2021-08-26 PROCEDURE — 85025 COMPLETE CBC W/AUTO DIFF WBC: CPT

## 2021-08-26 PROCEDURE — A9270 NON-COVERED ITEM OR SERVICE: HCPCS | Performed by: INTERNAL MEDICINE

## 2021-08-26 PROCEDURE — 82803 BLOOD GASES ANY COMBINATION: CPT

## 2021-08-26 PROCEDURE — 700105 HCHG RX REV CODE 258: Performed by: GENERAL PRACTICE

## 2021-08-26 PROCEDURE — A9270 NON-COVERED ITEM OR SERVICE: HCPCS | Performed by: GENERAL PRACTICE

## 2021-08-26 PROCEDURE — 94150 VITAL CAPACITY TEST: CPT

## 2021-08-26 PROCEDURE — A9270 NON-COVERED ITEM OR SERVICE: HCPCS | Performed by: STUDENT IN AN ORGANIZED HEALTH CARE EDUCATION/TRAINING PROGRAM

## 2021-08-26 PROCEDURE — 37799 UNLISTED PX VASCULAR SURGERY: CPT

## 2021-08-26 PROCEDURE — 700111 HCHG RX REV CODE 636 W/ 250 OVERRIDE (IP): Performed by: GENERAL PRACTICE

## 2021-08-26 PROCEDURE — 84134 ASSAY OF PREALBUMIN: CPT

## 2021-08-26 PROCEDURE — 700102 HCHG RX REV CODE 250 W/ 637 OVERRIDE(OP): Performed by: STUDENT IN AN ORGANIZED HEALTH CARE EDUCATION/TRAINING PROGRAM

## 2021-08-26 PROCEDURE — 99291 CRITICAL CARE FIRST HOUR: CPT | Mod: GC | Performed by: INTERNAL MEDICINE

## 2021-08-26 PROCEDURE — 84295 ASSAY OF SERUM SODIUM: CPT | Mod: 91

## 2021-08-26 PROCEDURE — 71045 X-RAY EXAM CHEST 1 VIEW: CPT

## 2021-08-26 PROCEDURE — 83735 ASSAY OF MAGNESIUM: CPT

## 2021-08-26 PROCEDURE — 94003 VENT MGMT INPAT SUBQ DAY: CPT

## 2021-08-26 PROCEDURE — 94799 UNLISTED PULMONARY SVC/PX: CPT

## 2021-08-26 PROCEDURE — 700111 HCHG RX REV CODE 636 W/ 250 OVERRIDE (IP): Performed by: INTERNAL MEDICINE

## 2021-08-26 RX ORDER — AMLODIPINE BESYLATE 5 MG/1
5 TABLET ORAL
Status: DISCONTINUED | OUTPATIENT
Start: 2021-08-26 | End: 2021-08-26

## 2021-08-26 RX ORDER — AMLODIPINE BESYLATE 5 MG/1
10 TABLET ORAL
Status: DISCONTINUED | OUTPATIENT
Start: 2021-08-27 | End: 2021-08-28

## 2021-08-26 RX ORDER — POTASSIUM CHLORIDE 29.8 MG/ML
40 INJECTION INTRAVENOUS ONCE
Status: COMPLETED | OUTPATIENT
Start: 2021-08-26 | End: 2021-08-26

## 2021-08-26 RX ORDER — SODIUM CHLORIDE, SODIUM LACTATE, POTASSIUM CHLORIDE, CALCIUM CHLORIDE 600; 310; 30; 20 MG/100ML; MG/100ML; MG/100ML; MG/100ML
INJECTION, SOLUTION INTRAVENOUS CONTINUOUS
Status: DISCONTINUED | OUTPATIENT
Start: 2021-08-26 | End: 2021-08-27

## 2021-08-26 RX ORDER — AMLODIPINE BESYLATE 5 MG/1
5 TABLET ORAL ONCE
Status: COMPLETED | OUTPATIENT
Start: 2021-08-26 | End: 2021-08-26

## 2021-08-26 RX ORDER — DEXMEDETOMIDINE HYDROCHLORIDE 4 UG/ML
.1-1.5 INJECTION, SOLUTION INTRAVENOUS CONTINUOUS
Status: DISCONTINUED | OUTPATIENT
Start: 2021-08-26 | End: 2021-08-27

## 2021-08-26 RX ORDER — IPRATROPIUM BROMIDE AND ALBUTEROL SULFATE 2.5; .5 MG/3ML; MG/3ML
3 SOLUTION RESPIRATORY (INHALATION) 4 TIMES DAILY
Status: DISCONTINUED | OUTPATIENT
Start: 2021-08-26 | End: 2021-08-27

## 2021-08-26 RX ORDER — OXYCODONE HYDROCHLORIDE 5 MG/1
5 TABLET ORAL EVERY 4 HOURS PRN
Status: DISCONTINUED | OUTPATIENT
Start: 2021-08-26 | End: 2021-08-27

## 2021-08-26 RX ORDER — MAGNESIUM SULFATE HEPTAHYDRATE 40 MG/ML
2 INJECTION, SOLUTION INTRAVENOUS ONCE
Status: COMPLETED | OUTPATIENT
Start: 2021-08-26 | End: 2021-08-26

## 2021-08-26 RX ORDER — IPRATROPIUM BROMIDE AND ALBUTEROL SULFATE 2.5; .5 MG/3ML; MG/3ML
3 SOLUTION RESPIRATORY (INHALATION)
Status: DISCONTINUED | OUTPATIENT
Start: 2021-08-26 | End: 2021-08-26

## 2021-08-26 RX ADMIN — DOCUSATE SODIUM 50 MG AND SENNOSIDES 8.6 MG 2 TABLET: 8.6; 5 TABLET, FILM COATED ORAL at 05:15

## 2021-08-26 RX ADMIN — DEXMEDETOMIDINE 0.4 MCG/KG/HR: 200 INJECTION, SOLUTION INTRAVENOUS at 23:32

## 2021-08-26 RX ADMIN — PROPOFOL 80 MCG/KG/MIN: 10 INJECTION, EMULSION INTRAVENOUS at 00:57

## 2021-08-26 RX ADMIN — PROPOFOL 80 MCG/KG/MIN: 10 INJECTION, EMULSION INTRAVENOUS at 03:48

## 2021-08-26 RX ADMIN — ENALAPRILAT 1.25 MG: 1.25 INJECTION INTRAVENOUS at 17:17

## 2021-08-26 RX ADMIN — DEXMEDETOMIDINE 0.2 MCG/KG/HR: 200 INJECTION, SOLUTION INTRAVENOUS at 09:47

## 2021-08-26 RX ADMIN — SODIUM CHLORIDE, POTASSIUM CHLORIDE, SODIUM LACTATE AND CALCIUM CHLORIDE: 600; 310; 30; 20 INJECTION, SOLUTION INTRAVENOUS at 07:18

## 2021-08-26 RX ADMIN — POLYETHYLENE GLYCOL 3350 1 PACKET: 17 POWDER, FOR SOLUTION ORAL at 17:17

## 2021-08-26 RX ADMIN — IPRATROPIUM BROMIDE AND ALBUTEROL SULFATE 3 ML: .5; 2.5 SOLUTION RESPIRATORY (INHALATION) at 18:58

## 2021-08-26 RX ADMIN — ACETAMINOPHEN 650 MG: 325 TABLET, FILM COATED ORAL at 02:32

## 2021-08-26 RX ADMIN — POTASSIUM CHLORIDE 40 MEQ: 29.8 INJECTION, SOLUTION INTRAVENOUS at 07:31

## 2021-08-26 RX ADMIN — SODIUM CHLORIDE 10 MG/HR: 9 INJECTION, SOLUTION INTRAVENOUS at 08:40

## 2021-08-26 RX ADMIN — SODIUM CHLORIDE 7.5 MG/HR: 9 INJECTION, SOLUTION INTRAVENOUS at 03:03

## 2021-08-26 RX ADMIN — ACETAMINOPHEN 650 MG: 325 TABLET, FILM COATED ORAL at 17:27

## 2021-08-26 RX ADMIN — HYDRALAZINE HYDROCHLORIDE 10 MG: 20 INJECTION INTRAMUSCULAR; INTRAVENOUS at 12:10

## 2021-08-26 RX ADMIN — SODIUM CHLORIDE: 9 INJECTION, SOLUTION INTRAVENOUS at 06:22

## 2021-08-26 RX ADMIN — IPRATROPIUM BROMIDE AND ALBUTEROL SULFATE 3 ML: .5; 2.5 SOLUTION RESPIRATORY (INHALATION) at 15:31

## 2021-08-26 RX ADMIN — OXYCODONE 5 MG: 5 TABLET ORAL at 20:54

## 2021-08-26 RX ADMIN — DOCUSATE SODIUM 50 MG AND SENNOSIDES 8.6 MG 2 TABLET: 8.6; 5 TABLET, FILM COATED ORAL at 17:17

## 2021-08-26 RX ADMIN — FAMOTIDINE 20 MG: 20 TABLET ORAL at 05:15

## 2021-08-26 RX ADMIN — IPRATROPIUM BROMIDE AND ALBUTEROL SULFATE 3 ML: .5; 2.5 SOLUTION RESPIRATORY (INHALATION) at 11:02

## 2021-08-26 RX ADMIN — AMLODIPINE BESYLATE 5 MG: 5 TABLET ORAL at 17:17

## 2021-08-26 RX ADMIN — ACETAMINOPHEN 650 MG: 325 TABLET, FILM COATED ORAL at 22:51

## 2021-08-26 RX ADMIN — MAGNESIUM SULFATE 2 G: 2 INJECTION INTRAVENOUS at 07:31

## 2021-08-26 RX ADMIN — LABETALOL HYDROCHLORIDE 10 MG: 5 INJECTION INTRAVENOUS at 11:02

## 2021-08-26 RX ADMIN — FAMOTIDINE 20 MG: 20 TABLET ORAL at 18:00

## 2021-08-26 RX ADMIN — ALTEPLASE 2 MG: 2.2 INJECTION, POWDER, LYOPHILIZED, FOR SOLUTION INTRAVENOUS at 17:40

## 2021-08-26 RX ADMIN — SODIUM CHLORIDE 10 MG/HR: 9 INJECTION, SOLUTION INTRAVENOUS at 05:55

## 2021-08-26 RX ADMIN — AMLODIPINE BESYLATE 5 MG: 5 TABLET ORAL at 09:47

## 2021-08-26 RX ADMIN — PROPOFOL 80 MCG/KG/MIN: 10 INJECTION, EMULSION INTRAVENOUS at 06:32

## 2021-08-26 ASSESSMENT — PAIN DESCRIPTION - PAIN TYPE
TYPE: ACUTE PAIN

## 2021-08-26 ASSESSMENT — PULMONARY FUNCTION TESTS: FVC: 1.16

## 2021-08-26 ASSESSMENT — COPD QUESTIONNAIRES: DURING THE PAST 4 WEEKS HOW MUCH DID YOU FEEL SHORT OF BREATH: MOST  OR ALL OF THE TIME

## 2021-08-26 NOTE — FLOWSHEET NOTE
Extubation    Cuff leak noted , yes  Stridor present , no  Patient toleration , inc WOB  Events/Summary/Plan:  Extubated to NC, inc to Oxymask, then to [facility] for maintain O2 sat>90%       maintain safety

## 2021-08-26 NOTE — THERAPY
Missed Therapy     Patient Name: Heide Rangel  Age:  51 y.o., Sex:  female  Medical Record #: [id number]  Today's Date: 8/26/2021 08/26/21 1013   Treatment Variance   Reason For Missed Therapy Medical - Patient Is Not Medically Stable   Initial Contact Note    Initial Contact Note  Order Received and Verified, Speech Therapy Evaluation in Progress with Full Report to Follow.   Interdisciplinary Plan of Care Collaboration   Collaboration Comments Orders received for a cognitive-lingusitic evaluation. However, patient is intubated and not appropriate at this time. Will HOLD evaluation and proceed when appropriate. Thank you.

## 2021-08-26 NOTE — DISCHARGE PLANNING
This RN CM received a voicemail from Cinda Neville who states she is the patient's appointed legal [place] through [facility] (P: [phone]). Cinda requesting to be notified immediately when the Pt can talk and when she will be discharged.     MASSIMO BOYIKN called [employer] , [phone] and discussed above request. [employer] states [facility] is not liable to notify any legal [place]/ investigators of anything regarding Pt's medical condition.    MASSIMO BOYKIN called Cinda back at the number above and notified her of above conversation with [employer]. Cinda states she is already in contact with Pt's daughter. Cinda wanting to send this RN [employer] court documents related to Pt's appointed legal [place]. MASSIMO BOYKIN notified Cinda that this CM has no need for such information. No Pt information provided to Cinda.

## 2021-08-26 NOTE — PROGRESS NOTES
Pt. Returned from MRI via ACLS RN and orienting RN, RT and patient transport.  MRI gurney, vent and transport monitor in place.

## 2021-08-26 NOTE — CARE PLAN
The patient is Watcher - Medium risk of patient condition declining or worsening    Shift Goals  Clinical Goals: MRI, BP and pain control, monitor neuro status  Patient Goals: FAN  Family Goals: Rest, pain control     Progress made toward(s) clinical / shift goals:  Pt. Remains vented/sedated, on Q1hr neuro assessments, MRI complete.     Patient is not progressing towards the following goals:      Problem: Knowledge Deficit - Standard  Goal: Patient and family/care givers will demonstrate understanding of plan of care, disease process/condition, diagnostic tests and medications  Outcome: Not Met     Problem: Pain - Standard  Goal: Alleviation of pain or a reduction in pain to the patient’s comfort goal  Outcome: Not Met     Problem: Fall Risk  Goal: Patient will remain free from falls  Outcome: Not Met     Problem: Skin Integrity  Goal: Skin integrity is maintained or improved  Outcome: Not Met     Problem: Psychosocial  Goal: Patient's level of anxiety will decrease  Outcome: Not Met  Goal: Patient's ability to verbalize feelings about condition will improve  Outcome: Not Met  Goal: Patient's ability to re-evaluate and adapt role responsibilities will improve  Outcome: Not Met  Goal: Patient and family will demonstrate ability to cope with life altering diagnosis and/or procedure  Outcome: Not Met  Goal: Spiritual and cultural needs incorporated into hospitalization  Outcome: Not Met     Problem: Hemodynamics  Goal: Patient's hemodynamics, fluid balance and neurologic status will be stable or improve  Outcome: Not Met     Problem: Respiratory  Goal: Patient will achieve/maintain optimum respiratory ventilation and gas exchange  Outcome: Not Met     Problem: Mechanical Ventilation  Goal: Safe management of artificial airway and ventilation  Outcome: Not Met  Goal: Successful weaning off mechanical ventilator, spontaneously maintains adequate gas exchange  Outcome: Not Met  Goal: Patient will be able to express needs  and understand communication  Outcome: Not Met     Problem: Risk for Aspiration  Goal: Patient's risk for aspiration will be absent or decrease  Outcome: Not Met     Problem: Urinary - Renal Perfusion  Goal: Ability to achieve and maintain adequate renal perfusion and functioning will improve  Outcome: Not Met     Problem: Venous Thromboembolism (VTE) Prevention  Goal: The patient will remain free from venous thromboembolism (VTE)  Outcome: Not Met     Problem: Nutrition  Goal: Patient's nutritional and fluid intake will be adequate or improve  Outcome: Not Met  Goal: Enteral nutrition will be maintained or improve  Outcome: Not Met  Goal: Enteral nutrition will be maintained or improve  Outcome: Not Met     Problem: Urinary Elimination  Goal: Establish and maintain regular urinary output  Outcome: Not Met     Problem: Bowel Elimination  Goal: Establish and maintain regular bowel function  Outcome: Not Met

## 2021-08-26 NOTE — PROGRESS NOTES
Neurosurgery Progress Note    Subjective:  No acute events overnight.  Seen in conjunction with Neurology     Exam:  Intubated. Sedated. Propofol 40mcg/kg/min, Awakens with voice/agitated with stimuli, Follows some commands   Nicardipine 10mg/hr, BP stable this am, MAP in range   2-3mm PERRL sluggish bilateral   LLE 4/5, RLE 3/5   Gives thumbs up on left answering questions, Left  2/5  RUE flaccid             BP  Min: 118/63  Max: 162/88  Pulse  Av  Min: 65  Max: 88  Resp  Av.1  Min: 18  Max: 62  Monitored Temp 2  Av.1 °C (98.7 °F)  Min: 36.1 °C (97 °F)  Max: 38.2 °C (100.8 °F)  SpO2  Av.9 %  Min: 92 %  Max: 100 %    No data recorded    Recent Labs     21  0600   WBC 13.3*   RBC 4.56   HEMOGLOBIN 12.9   HEMATOCRIT 38.6   MCV 84.6   MCH 28.3   MCHC 33.4*   RDW 46.4   PLATELETCT 189   MPV 9.8     Recent Labs     21  1256 21  0006 21  0600   SODIUM 139 138 140   POTASSIUM  --   --  2.9*   CHLORIDE  --   --  106   CO2  --   --  23   GLUCOSE  --   --  108*   BUN  --   --  15   CREATININE  --   --  0.94   CALCIUM  --   --  7.9*         Recent Labs     21  0219   REACTMIN 5.2   CLOTKINET 1.1   CLOTANGL 76.3   MAXCLOTS 60.6   [id number] 0.5   PRCINADP 36.8*   PRCINAA 97.5*       Intake/Output                       21 07 - 21 0659 21 07 - 21 0659     3863-6454 3497-4616 Total 2485-6113 4635-8195 Total                 Intake    I.V.  2149.7  2215.5 4365.2  564.3  -- 564.3    Magnesium Sulfate Volume -- -- -- 12.1 -- 12.1    Cardene Volume 618.3 935.8 1554.2 160.4 -- 160.4    Propofol Volume 468.7 455.6 924.3 58.3 -- 58.3    IV Volume (Fentanyl Gtt) -- 24 24 4 -- 4    Volume (mL) (NS infusion) 1062.7 800 1862.7 249.3 -- 249.3    Volume (mL) (potassium chloride in water (KCL) ivpb (ICU ONLY) 40 mEq) -- -- -- 24.2 -- 24.2    Volume (mL) (lactated ringers infusion) -- -- -- 56 -- 56    Other  160  -- 160  --  -- --    Medications (PO/Enteral Liquids)  160 -- 160 -- -- --    NG/GT  125  300 425  80  -- 80    Intake (mL) (Enteral Tube 08/25/21 Cortrak - Gastric Right nare) 125 300 425 80 -- 80    Total Intake 2434.7 2515.5 4950.2 644.3 -- 644.3       Output    Urine  629  845 1474  180  -- 180    Output (mL) (Urethral Catheter)  180 -- 180    Other  100  -- 100  --  -- --    Other 100 -- 100 -- -- --    Emesis/NG output  --  -- --  0  -- 0    Output (mL) (Enteral Tube 08/25/21 Cortrak - Gastric Right nare) -- -- -- 0 -- 0    Total Output  180 -- 180       Net I/O     1705.7 1670.5 3376.2 464.3 -- 464.3            Intake/Output Summary (Last 24 hours) at 8/26/2021 0905  Last data filed at 8/26/2021 0800  Gross per 24 hour   Intake 5204.37 ml   Output 1554 ml   Net 3650.37 ml            • magnesium sulfate  2 g Once   • potassium chloride (KCL-CENTRAL) IV *Administer in ICU only*  40 mEq Once   • LR   Continuous   • alteplase  2 mg Once   • famotidine  20 mg Q12HRS    Or   • famotidine  20 mg Q12HRS   • senna-docusate  2 Tablet BID    And   • polyethylene glycol/lytes  1 Packet QDAY PRN    And   • magnesium hydroxide  30 mL QDAY PRN    And   • bisacodyl  10 mg QDAY PRN   • lidocaine  2 mL Q30 MIN PRN   • Pharmacy  1 Each PHARMACY TO DOSE   • fentaNYL  100 mcg Q15 MIN PRN    And   • fentaNYL  200 mcg Q15 MIN PRN    And   • fentaNYL   Continuous    And   • propofol  0-80 mcg/kg/min Continuous   • insulin regular  1-6 Units Q6HRS    And   • glucose  16 g Q15 MIN PRN    And   • dextrose 50%  50 mL Q15 MIN PRN   • Respiratory Therapy Consult   Continuous RT   • LORazepam  2 mg Q5 MIN PRN   • acetaminophen  650 mg Q4HRS PRN    Or   • acetaminophen  650 mg Q4HRS PRN   • ondansetron  4 mg Q4HRS PRN    Or   • ondansetron  4 mg Q4HRS PRN   • MD Alert...Adult ICU Electrolyte Replacement per Pharmacy   PHARMACY TO DOSE   • niCARdipine infusion  0-15 mg/hr Continuous   • labetalol  10 mg Q4HRS PRN   • hydrALAZINE  10 mg Q4HRS PRN   • enalaprilat  1.25 mg  Q6HRS PRN   • promethazine  12.5-25 mg Q4HRS PRN   • prochlorperazine  5-10 mg Q4HRS PRN   • promethazine  12.5-25 mg Q4HRS PRN       Assessment and Plan:  Hospital day #2  POD #na/  Prophylactic anticoagulation: no         Start date/time: TBD    Plan:  MRI brain reviewed by DR. Chanel, no surgical intervention   EEG negative   Wean vent as tolerated  No ASA or anticoagulants  Continue Keppra  Keep SBP less than 140  Will repeat MRI Brain in 6 weeks   Keep eunatremic

## 2021-08-26 NOTE — PROGRESS NOTES
"[facility] ICU Progress Note      Admit Date: 8/25/2021    Resident(s): Giovani Hogan Jr., M.D.  Attending: BRADLEY [place]/ Dr. Bella    Date & Time:   8/26/2021   10:18 AM       Patient ID:    Name:             Heide Rangel     YOB: 1970  Age:                 51 y.o.  female   MRN:               [id number]      Hospital Course:51 year old female w/ history of HTN,CKD, transfer from [facility] for intracranial hemorrhage. Presented with dysarthria, R facial droop, decreased sensation right side with /130. CT found left parietal intraparenchymal hemorrhage. Intubated. Presented with hypertensive emergency improved on Nicardipine drip with improvement and transitioned to oral Amlodipine. Echo w/ bubble study unremarkable. 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 Neurosurgery recommends no intervention at this time, no seizure prophylaxis indicated now. Stopped Propofol, on Precedex and will try SBT with goal to extubate today.        Consultants:  Critical Care  Neurosurgery  Neurology     Interval Events:  Overnight, no acute events. Blood pressure downtrending at goal below 140.MRI and EEG completed. EEG shows no seizure activity. Neurosurgery recommends no intervention after reviewing MRI today with repeat 6 weeks. No seizure prophylaxis indicated at this time per Neurosurgery. Telemetry 70-80's.    ROS  Unable to perform ROS: Intubated     PHYSICAL EXAM  Vitals:    08/26/21 0630 08/26/21 0700 08/26/21 0800 08/26/21 0810   BP:       Pulse: 80 81 83 83   Resp: (!) 24 (!) 24 (!) 31 18   Temp:       TempSrc:   Bladder    SpO2: 97% 97% 98% 95%   Weight:       Height:         Body mass index is 30.32 kg/m².  /74   Pulse 83   Temp 36.1 °C (97 °F) (Temporal)   Resp 18   Ht 1.702 m (5' 7\")   Wt 87.8 kg (193 lb 9 oz)   SpO2 95%   BMI 30.32 kg/m²   O2 therapy: Pulse Oximetry: 95 %, O2 Delivery Device: Ventilator    Physical " Exam  Vitals and nursing note reviewed.   Constitutional:       General: lying in bed intubated     Appearance: She is normal weight. She is ill-appearing.      Interventions: She is sedated, intubated and restrained.       HENT:      Head: Normocephalic.      Nose: Nose normal. No congestion.      Mouth/Throat:      Mouth: Mucous membranes are moist.      Pharynx: Oropharynx is clear.      Comments: Endotracheal tube in position, orogastric tube  Eyes:      General: No scleral icterus.     Conjunctiva/sclera: Conjunctivae normal.      Pupils: Pupils are equal, round, and reactive to light.   Neck:      Comments:R IJ line: clean/dry/intact  Cardiovascular:      Rate and Rhythm: Regular rate and rhythm       Pulses: Normal pulses.      Heart sounds: Normal heart sounds. No murmur heard.         Pulmonary:      Effort: No accessory muscle usage. She is intubated.      Breath sounds: mild rhonchi otherwise no wheezing or rales.     Abdominal:      General: Bowel sounds are normal. There is no distension.      Palpations: Abdomen is soft.      Tenderness: There is no abdominal tenderness. There is no guarding.   Genitourinary:     Comments: Dai catheter in place-yellowish urine  Musculoskeletal:         General: No tenderness.      Cervical back: Neck supple. No rigidity.      Right lower leg: No edema.      Left lower leg: No edema.   Skin:     General: Skin is warm and dry.      Capillary Refill: Capillary refill takes less than 2 seconds.      Coloration: Skin is not pale.   Neurological:      Comments: awake, not following commands or moving RUE/RLE. Not withdrawing to pain on RUE/RLE.           Respiratory:  Vent Mode: APVCMV  Respiration: 18, Pulse Oximetry: 95 %    Chest Tube Drains:    Recent Labs     08/25/21  0152 08/26/21  0315   ISTATAPH 7.331* 7.372*   ISTATAPCO2 56.1* 37.5*   ISTATAPO2 320* 70   ISTATATCO2 31 23   NRTEMUZ1AWE 100* 93   ISTATARTHCO3 29.7* 21.8   ISTATARTBE 2 -3   ISTATTEMP see below 100.0  F   ISTATFIO2 100 40   ISTATSPEC Arterial Arterial   ISTATAPHTC  --  7.361*   [id number]  --  73       HemoDynamics:  Pulse: 83 Blood Pressure: 125/74, Arterial BP: 132/70      Neuro:      Fluids:  Date 21 0700 - 21 0659   Shift 4926-6250 5394-5381 1761-2411 24 Hour Total   INTAKE   I.V. 564.3   564.3     Magnesium Sulfate Volume 12.1   12.1     Cardene Volume 160.4   160.4     Propofol Volume 58.3   58.3     IV Volume (Fentanyl Gtt) 4   4     Volume (mL) (NS infusion) 249.3   249.3     Volume (mL) (potassium chloride in water (KCL) ivpb (ICU ONLY) 40 mEq) 24.2   24.2     Volume (mL) (lactated ringers infusion) 56   56   NG/GT 80   80     Intake (mL) (Enteral Tube 21 Cortrak - Gastric Right nare) 80   80   Shift Total 644.3   644.3   OUTPUT   Urine 180   180     Output (mL) (Urethral Catheter) 180   180   Emesis/NG output 0   0     Output (mL) (Enteral Tube 21 Cortrak - Gastric Right nare) 0   0   Shift Total 180   180   .3   464.3        Intake/Output Summary (Last 24 hours) at 2021 0451  Last data filed at 2021 0400  Gross per 24 hour   Intake 4632.52 ml   Output 1279 ml   Net 3353.52 ml          Body mass index is 30.32 kg/m².    Recent Labs     21  1256 21  0006 21  0600   SODIUM 139 138 140   POTASSIUM  --   --  2.9*   CHLORIDE  --   --  106   CO2  --   --  23   BUN  --   --  15   CREATININE  --   --  0.94   MAGNESIUM  --   --  1.7   PHOSPHORUS  --   --  3.2   CALCIUM  --   --  7.9*       GI/Nutrition:  Recent Labs     21  0600   PREALBUMIN 17.4*   GLUCOSE 108*       Heme:  Recent Labs     21  0600   RBC 4.56   HEMOGLOBIN 12.9   HEMATOCRIT 38.6   PLATELETCT 189       Infectious Disease:  Monitored Temp 2  Av.1 °C (98.7 °F)  Min: 36.2 °C (97.2 °F)  Max: 38.2 °C (100.8 °F)  Recent Labs     21  0600   WBC 13.3*   NEUTSPOLYS 84.10*   LYMPHOCYTES 8.40*   MONOCYTES 4.60   EOSINOPHILS 2.00   BASOPHILS 0.30       Meds:  • LR   80 mL/hr  at 08/26/21 0718   • alteplase  2 mg     • dexmedetomidine (PRECEDEX) infusion  0.1-1.5 mcg/kg/hr 0.2 mcg/kg/hr (08/26/21 0947)   • amLODIPine  5 mg     • famotidine  20 mg      Or   • famotidine  20 mg     • senna-docusate  2 Tablet      And   • polyethylene glycol/lytes  1 Packet      And   • magnesium hydroxide  30 mL      And   • bisacodyl  10 mg     • lidocaine  2 mL     • Pharmacy  1 Each     • fentaNYL  100 mcg      And   • fentaNYL  200 mcg     • insulin regular  1-6 Units      And   • glucose  16 g      And   • dextrose 50%  50 mL     • Respiratory Therapy Consult       • LORazepam  2 mg     • acetaminophen  650 mg      Or   • acetaminophen  650 mg     • ondansetron  4 mg      Or   • ondansetron  4 mg     • MD Alert...Adult ICU Electrolyte Replacement per Pharmacy       • niCARdipine infusion  0-15 mg/hr 10 mg/hr (08/26/21 0840)   • labetalol  10 mg     • hydrALAZINE  10 mg     • enalaprilat  1.25 mg     • promethazine  12.5-25 mg     • prochlorperazine  5-10 mg     • promethazine  12.5-25 mg            Imaging:  DX-CHEST-PORTABLE (1 VIEW)   Final Result      1.  Line and tube position as described above. No visible pneumothorax.   2.  LEFT basilar atelectasis or pneumonia      MR-BRAIN-WITH & W/O   Final Result      1.  Approximately 40 mm acute parenchymal hemorrhage at the left posterior frontal convexity. No abnormal vascular flow voids or underlying enhancing mass lesion.   2.  Encephalomalacic change in the left frontal deep white matter along the corona radiata and far posterior aspect of the left head of caudate nucleus consistent with old infarction.   3.  Moderate supratentorial white matter disease most consistent with microvascular ischemic change.   4.  Incidental 9 mm meningioma at the floor of the left middle cranial fossa.   5.  Old lacunar size infarct in the left paramedian hans.   6.  Mild-moderate pontine ischemic gliosis.      EC-ECHOCARDIOGRAM COMPLETE W/O CONT   Final Result       DX-ABDOMEN FOR TUBE PLACEMENT   Final Result      Feeding tube extends below the diaphragm with tip at the level of the proximal duodenum.      DX-CHEST-LIMITED (1 VIEW)   Final Result         1.  Line and tube placements are noted as described above.      2.  No pneumothorax.      3.  Mild perihilar congestion.      4.  No consolidations identified.      CT-HEAD W/O   Final Result         1.  No significant change in left-sided parenchymal hemorrhage since prior CT.      2.  Minimal midline shift to the right side.      3.  No new intracranial hemorrhage is identified.         OUTSIDE IMAGES-CT HEAD   Final Result      CT-HEAD W/O    (Results Pending)       Assessment and Plan:      * Nontraumatic cortical hemorrhage of left cerebral hemisphere (HCC)  Assessment & Plan  Likely hypertension induced with right-sided deficits vs AV fistula vs underlying mass versus amyloid. Echo reveals: EF 70%, no significant valve abnormalities. No evidence of right to left shunting with bubble study with no identified cardioembolic source.  -BP goal <140/90-at goal  -Avoid anticoagulants  -PT/OT/SLP eval  -MRI Brain demonstrates:  40 mm acute parenchymal hemorrhage at the left posterior frontal convexity. No abnormal vascular flow voids or underlying enhancing mass lesion. Encephalomalacic change in the left frontal deep white matter along the corona radiata and far posterior aspect of the left head of caudate nucleus consistent with old infarction.  -EEG:No seizures captured    -Neurology consult placed  -PM&R following  -MRI brain reviewed by DR. Chanel, no surgical intervention   -repeat MRI Brain in 6 weeks   -no seizure prophylaxis indicated at this time  -Keep eunatremic   -appreciate Neurosurgery and Neurology recommendations    Hypertensive emergency  Assessment & Plan  IMproved  -continue with goal to titrate down on nicardipine drip for goal less than 140/90  -start Amlodipine 5mg daily, titrate up as tolerated  -PRN  labetalol, hydralazine      Acute respiratory failure with hypoxia (HCC)  Assessment & Plan  Vent Day #2. Intubated in ED for airway protection 8/25  RT/O2 protocol  -wean vent  -stop Propofol  -start Precedex  -versed an Fentanyl for agitation and sedation  -try SBT today; goal to extubate to reduce ventilator associated pneumonia    Chronic kidney disease  Assessment & Plan  Stable  -avoid nephrotoxins  -renally dose medications  -strict I/0's  -daily renal function      Drug use  Assessment & Plan  Amphetamines and THC positive on urine drug screen  Additional information to be gathered from family  Eventual drug cessation education to be provided when appropriate    Acute pulmonary edema (HCC)  Assessment & Plan  resolved    DISPO: ICU     CODE STATUS: Full      Quality Measures:  Dai Catheter: yes  DVT Prophylaxis: SCD  Ulcer Prophylaxis: Famotidine  Antibiotics: none  Lines: R IJ, PIV, right radial arterial line

## 2021-08-26 NOTE — RESPIRATORY CARE
Problem: Ventilation  Goal: Ability to achieve and maintain unassisted ventilation or tolerate decreased levels of ventilator support  Description: Document on Vent flowsheet    1.  Support and monitor invasive and noninvasive mechanical ventilation  2.  Monitor ventilator weaning response  3.  Perform ventilator associated pneumonia prevention interventions  4.  Manage ventilation therapy by monitoring diagnostic test results  Outcome: Progressing                                   Ventilator Daily Summary     Vent Day #2     7.5 ETT @ 23     Ventilator settings: APVCMV 24/350/8/50%     Weaning trials: None     Respiratory Procedures: None     Plan: Continue current ventilator settings and wean mechanical ventilation as tolerated per physician orders.

## 2021-08-26 NOTE — CONSULTS
Neurosurgery Consultation     Date of consult: 8/25/2021     Referring Physician Dr. Caruso  Reason for referral:  Intraparenchymal hemorrrhage     History of Presenting Illness  51 y.o. female with hypertension and chronic kidney disease transferred from [facility] ER for intracranial hemorrhage. She presented to the ED there with sudden onset of slurred speech, right facial droop and decreased sensation in the right upper and lower extremities Her blood pressure was 250/130. CT , demontrated a left intraparenchymal hemorrhage.     She apparently does not take any blood thinners and did not have any preceding trauma. Upon arrival to the ED, patient was unable to protect her airway but per the ER physician, was moving the left side of her body, dysarthric and following intermittent commands.  She was intubated for airway protection.     Code Status  Full Code     Review of Systems  Review of Systems   Unable to perform ROS: Intubated      Past Medical History   has no past medical history on file. -Hypertension, chronic kidney disease, seasonal allergies     Surgical History   has no past surgical history on file. -Unknown     Family History  family history is not on file. -Unknown     Social History   Per the transfer records, patient has a history of tobacco use as well as marijuana and no alcohol use.     Medications      Home Medications              Reviewed by Gaurav Hillman (Pharmacy Tech) on 08/25/21 at 0159  Med List Status: Unable to Obtain          Medication Last Dose Status             Patient Oscar Taking any Medications                                        *Outpatient medications include fluticasone, cetirizine, albuterol     Current Medications   Current Facility-Administered Medications   Medication Dose Route Frequency Provider Last Rate Last Admin   • famotidine (PEPCID) tablet 20 mg  20 mg Enteral Tube Q12HRS Jeremy M Gonda, M.D.         Or   • famotidine (PEPCID)  injection 20 mg  20 mg Intravenous Q12HRS Jeremy M Gonda, M.D.       • senna-docusate (PERICOLACE or SENOKOT S) 8.6-50 MG per tablet 2 Tablet  2 Tablet Enteral Tube BID Jeremy M Gonda, M.D.         And   • polyethylene glycol/lytes (MIRALAX) PACKET 1 Packet  1 Packet Enteral Tube QDAY PRN Jeremy M Gonda, M.D.         And   • magnesium hydroxide (MILK OF MAGNESIA) suspension 30 mL  30 mL Enteral Tube QDAY PRN Jeremy M Gonda, M.D.         And   • bisacodyl (DULCOLAX) suppository 10 mg  10 mg Rectal QDAY PRN Jeremy M Gonda, M.D.       • lidocaine (XYLOCAINE) 1 % injection 2 mL  2 mL Tracheal Tube Q30 MIN PRN Jeremy M Gonda, M.D.       • Pharmacy Consult: Enteral tube insertion - review meds/change route/product selection  1 Each Other PHARMACY TO DOSE Jeremy M Gonda, M.D.       • fentaNYL (SUBLIMAZE) injection 100 mcg  100 mcg Intravenous Q15 MIN PRN Jeremy M Gonda, M.D.   100 mcg at 08/25/21 0252     And   • fentaNYL (SUBLIMAZE) injection 200 mcg  200 mcg Intravenous Q15 MIN PRN Jeremy M Gonda, M.D.         And   • fentaNYL (SUBLIMAZE) 50 mcg/mL in 50mL (Continuous Infusion)   Intravenous Continuous Jeremy M Gonda, M.D.   Dose not Required at 08/25/21 0130     And   • propofol (DIPRIVAN) injection  0-80 mcg/kg/min Intravenous Continuous Jeremy M Gonda, M.D.       • insulin regular (HumuLIN R,NovoLIN R) injection  1-6 Units Subcutaneous Q6HRS Jeremy M Gonda, M.D.         And   • glucose 4 g chewable tablet 16 g  16 g Oral Q15 MIN PRN Jeremy M Gonda, M.D.         And   • dextrose 50% (D50W) injection 50 mL  50 mL Intravenous Q15 MIN PRN Jeremy M Gonda, M.D.       • propofol (DIPRIVAN) injection  0-80 mcg/kg/min Intravenous Continuous Louis Caruso M.D. 34 mL/hr at 08/25/21 0338 70 mcg/kg/min at 08/25/21 0338   • Respiratory Therapy Consult   Nebulization Continuous RT Jeremy M Gonda, M.D.       • NS infusion   Intravenous Continuous Jeremy M Gonda, M.D.       • LORazepam (ATIVAN) injection 2 mg  2 mg Intravenous Q5  MIN PRN Jeremy M Gonda, M.D.       • acetaminophen (Tylenol) tablet 650 mg  650 mg Enteral Tube Q4HRS PRN Jeremy M Gonda, M.D.         Or   • acetaminophen (TYLENOL) suppository 650 mg  650 mg Rectal Q4HRS PRN Jeremy M Gonda, M.D.       • ondansetron (ZOFRAN ODT) dispertab 4 mg  4 mg Enteral Tube Q4HRS PRN Jeremy M Gonda, M.D.         Or   • ondansetron (ZOFRAN) syringe/vial injection 4 mg  4 mg Intravenous Q4HRS PRN Jeremy M Gonda, M.D.       • MD Alert...ICU Electrolyte Replacement per Pharmacy   Other PHARMACY TO DOSE Jeremy M Gonda, M.D.       • niCARdipine (CARDENE) 25 mg in  mL Infusion  0-15 mg/hr Intravenous Continuous Jeremy M Gonda, M.D.   Stopped at 08/25/21 0339   • labetalol (NORMODYNE/TRANDATE) injection 10 mg  10 mg Intravenous Q4HRS PRN Jeremy M Gonda, M.D.       • hydrALAZINE (APRESOLINE) injection 10 mg  10 mg Intravenous Q4HRS PRN Jeremy M Gonda, M.D.       • enalaprilat (VASOTEC) injection 1.25 mg  1.25 mg Intravenous Q6HRS PRN Jeremy M Gonda, M.D.       • promethazine (PHENERGAN) tablet 12.5-25 mg  12.5-25 mg Oral Q4HRS PRN Jeremy M Gonda, M.D.       • promethazine (PHENERGAN) suppository 12.5-25 mg  12.5-25 mg Rectal Q4HRS PRN Jeremy M Gonda, M.D.       • prochlorperazine (COMPAZINE) injection 5-10 mg  5-10 mg Intravenous Q4HRS PRN Jeremy M Gonda, M.D.                Allergies  Not on File     Vital Signs last 24 hours  Pulse:  [86-90] 87  Resp:  [24-34] 31  BP: (143-182)/(75-97) 143/75  SpO2:  [96 %-99 %] 96 %     Physical Exam  Intubated sedated  Pupils 2 mm midline  No eye opening minimal movement to stimulation     Fluids  No intake or output data in the 24 hours ending 08/25/21 1152     Laboratory  Recent Results         Recent Results (from the past 48 hour(s))   POCT arterial blood gas device results     Collection Time: 08/25/21  1:52 AM   Result Value Ref Range     Ph 7.331 (L) 7.400 - 7.500     Pco2 56.1 (HH) 26.0 - 37.0 mmHg     Po2 320 (H) 64 - 87 mmHg     Tco2 31 20 - 33  mmol/L     S02 100 (H) 93 - 99 %     Hco3 29.7 (H) 17.0 - 25.0 mmol/L     BE 2 -4 - 3 mmol/L     Body Temp see below degrees     O2 Therapy 100 %     iPF Ratio 320       Specimen Arterial       DelSys Vent       Tidal Volume 350 mL     Peep End Expiratory Pressure 8 cmh20     Set Rate 20       Mode APV-CMV     Lipid Profile - Fasting     Collection Time: 08/25/21  2:19 AM   Result Value Ref Range     Cholesterol,Tot 181 100 - 199 mg/dL     Triglycerides 77 0 - 149 mg/dL     HDL 82 >=40 mg/dL     LDL 84 <100 mg/dL   Sodium Serum (NA)     Collection Time: 08/25/21  2:19 AM   Result Value Ref Range     Sodium 138 135 - 145 mmol/L   Platelet Mapping with Basic TEG     Collection Time: 08/25/21  2:19 AM   Result Value Ref Range     Reaction Time Initial-R 5.2 4.6 - 9.1 min     React Time Initial Hep 4.7 4.3 - 8.3 min     Clot Kinetics-K 1.1 0.8 - 2.1 min     Clot Angle-Angle 76.3 63.0 - 78.0 degrees     Maximum Clot Strength-MA 60.6 52.0 - 69.0 mm     TEG Functional Fibrinogen(MA) 20.7 15.0 - 32.0 mm     Lysis 30 minutes-LY30 0.5 0.0 - 2.6 %     % Inhibition ADP 36.8 (H) 0.0 - 17.0 %     % Inhibition AA 97.5 (H) 0.0 - 11.0 %     TEG Algorithm Link Algorithm     URINE DRUG SCREEN     Collection Time: 08/25/21  3:33 AM   Result Value Ref Range     Amphetamines Urine Positive (A) Negative     Barbiturates Negative Negative     Benzodiazepines Negative Negative     Cocaine Metabolite Negative Negative     Methadone Negative Negative     Opiates Negative Negative     Oxycodone Negative Negative     Phencyclidine -Pcp Negative Negative     Propoxyphene Negative Negative     Cannabinoid Metab Positive (A) Negative            Imaging  DX-CHEST-LIMITED (1 VIEW)   Final Result           1.  Line and tube placements are noted as described above.       2.  No pneumothorax.       3.  Mild perihilar congestion.       4.  No consolidations identified.       CT-HEAD W/O   Final Result           1.  No significant change in left-sided  parenchymal hemorrhage since prior CT.       2.  Minimal midline shift to the right side.       3.  No new intracranial hemorrhage is identified.         AP:  51 year old female with 2 cm superficial left frontal-parietal ICH with severe hypertension and urine tox screen positive for amphetamines.  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  Her neurologic deterioration is out of proportion to the size of the hematoma.  EEG was recommended at 1 am but has not yet been performed.    Recommend STAT EEG r/o seizure  MRI +/- contrast evaluate tumor  CTA  Wean sedation as tolerated  Keep SBP < 140   Keep eunatremic

## 2021-08-26 NOTE — PROGRESS NOTES
0710- Bedside report complete with Jena RN, Patient resting in bed. Lines and gtts verified. Propfol held for SAT.    0720- Patient awake, not following commands or moving right side. Patient does not WD to pain on the right upper or lower extremity.     0730- Propofol restarted at 40mcg/kg/min.    1025- Patient extubated to 4.5L NC.

## 2021-08-26 NOTE — CARE PLAN
Problem: Knowledge Deficit - Standard  Goal: Patient and family/care givers will demonstrate understanding of plan of care, disease process/condition, diagnostic tests and medications  Outcome: Progressing     Problem: Pain - Standard  Goal: Alleviation of pain or a reduction in pain to the patient’s comfort goal  Outcome: Progressing   The patient is Watcher - Medium risk of patient condition declining or worsening         Progress made toward(s) clinical / shift goals:  Fentanyl drip started. CPOT 0-2    Patient is not progressing towards the following goals:

## 2021-08-26 NOTE — PROGRESS NOTES
Pt. To MRI via ACLS RN, orienting RN, RT and patient transport.  MRI gurney and vent in place, monitor in place.

## 2021-08-26 NOTE — PROGRESS NOTES
Removed the right arterial line using gauze for pressure after removing 2 sutures and placed pressure dressing over the region without complication.

## 2021-08-26 NOTE — CONSULTS
Neurology Initial Consult H&P  Neurohospitalist Service, [facility]    Referring Physician: Bebo Bella M.D.    Chief Complaint   Patient presents with   • Possible Stroke       HPI: Heide Rangel is a 51 y.o. female with history of COPD, HTN, kidney disease, and substance abuse who presented to [facility] ED on 8/25/21 with acute onset of slurred speech, right facial droop, and decreased sensation in the RUE/RLE. She is currently intubated and sedated, thus history comes from chart review. At [facility], her NIHSS was 6 prompting a code stroke to be called. Patient was noted to be hypertensive with /130. CT head wo contrast there revealed a left parietal intraparenchymal hemorrhage and she was transferred to [facility] for higher level of care. Reportedly she does not take any blood thinners, and there was no preceding fall/trauma. In the ED, she was unable to protect her airway with foaming of the mouth, gurgling, dysarthria, and intermittently following commands with movement of the left side of her body. Patient was then intubated, nicardipine infusion was started, and Neurosurgery and Critical Care were consulted. UDS was positive for amphetamines. Dr. Chanel, Neurosurgery, recommended no surgical intervention.      She is currently lying in bed, intubated, sedated on Propofol 40mcg/kg/min and Fentanyl 100mcg/hr, opens her eyes to verbal stimuli, follows commands on with LUE and BLE, and RUE hemiplegia.     Past medical history:   Past Medical History:   Diagnosis Date   • COPD (chronic obstructive pulmonary disease) (HCC)    • Hypertension    • Kidney disease    • Substance abuse (HCC)        Past surgical history:   No past surgical history on file.    Family history:   No family history on file.    Social history:   Social History     Socioeconomic History   • Marital status: Unknown     Spouse name: Not on file   • Number of children: Not  on file   • Years of education: Not on file   • Highest education level: Not on file   Occupational History   • Not on file   Tobacco Use   • Smoking status: Not on file   Substance and Sexual Activity   • Alcohol use: Not on file   • Drug use: Not on file   • Sexual activity: Not on file   Other Topics Concern   • Not on file   Social History Narrative   • Not on file     Social Determinants of Health     Financial Resource Strain:    • Difficulty of Paying Living Expenses:    Food Insecurity:    • Worried About Running Out of Food in the Last Year:    • Ran Out of Food in the Last Year:    Transportation Needs:    • Lack of Transportation (Medical):    • Lack of Transportation (Non-Medical):    Physical Activity:    • Days of Exercise per Week:    • Minutes of Exercise per Session:    Stress:    • Feeling of Stress :    Social Connections:    • Frequency of Communication with Friends and Family:    • Frequency of Social Gatherings with Friends and Family:    • Attends Muslim Services:    • Active Member of Clubs or Organizations:    • Attends Club or Organization Meetings:    • Marital Status:    Intimate Partner Violence:    • Fear of Current or Ex-Partner:    • Emotionally Abused:    • Physically Abused:    • Sexually Abused:        Allergies:  Not on File    Medications:    Current Facility-Administered Medications:   •  lactated ringers infusion, , Intravenous, Continuous, Giovani Hogan Jr., M.D., Last Rate: 80 mL/hr at 08/26/21 0718, New Bag at 08/26/21 0718  •  alteplase (CATHFLO) syringe *Central Line Only* 2 mg, 2 mg, Intracatheter, Once, Giovani Hogan Jr., M.D.  •  dexmedetomidine (PRECEDEX) 400 mcg/100mL NS premix infusion, 0.1-1.5 mcg/kg/hr, Intravenous, Continuous, Bebo Bella M.D., Last Rate: 4.4 mL/hr at 08/26/21 0947, 0.2 mcg/kg/hr at 08/26/21 0947  •  amLODIPine (NORVASC) tablet 5 mg, 5 mg, Enteral Tube, Q DAY, Bebo Bella M.D., 5 mg at 08/26/21 0947  •  ipratropium-albuterol  (DUONEB) nebulizer solution, 3 mL, Nebulization, Q4HRS (RT), Bebo Bella M.D.  •  famotidine (PEPCID) tablet 20 mg, 20 mg, Enteral Tube, Q12HRS, 20 mg at 08/26/21 0515 **OR** famotidine (PEPCID) injection 20 mg, 20 mg, Intravenous, Q12HRS, Jeremy M Gonda, M.D., 20 mg at 08/25/21 0504  •  senna-docusate (PERICOLACE or SENOKOT S) 8.6-50 MG per tablet 2 Tablet, 2 Tablet, Enteral Tube, BID, 2 Tablet at 08/26/21 0515 **AND** polyethylene glycol/lytes (MIRALAX) PACKET 1 Packet, 1 Packet, Enteral Tube, QDAY PRN **AND** magnesium hydroxide (MILK OF MAGNESIA) suspension 30 mL, 30 mL, Enteral Tube, QDAY PRN **AND** bisacodyl (DULCOLAX) suppository 10 mg, 10 mg, Rectal, QDAY PRN, Jeremy M Gonda, M.D.  •  lidocaine (XYLOCAINE) 1 % injection 2 mL, 2 mL, Tracheal Tube, Q30 MIN PRN, Jeremy M Gonda, M.D.  •  Pharmacy Consult: Enteral tube insertion - review meds/change route/product selection, 1 Each, Other, PHARMACY TO DOSE, Jeremy M Gonda, M.D.  •  fentaNYL (SUBLIMAZE) injection 100 mcg, 100 mcg, Intravenous, Q15 MIN PRN, 100 mcg at 08/25/21 2030 **AND** fentaNYL (SUBLIMAZE) injection 200 mcg, 200 mcg, Intravenous, Q15 MIN PRN, 200 mcg at 08/25/21 1427 **AND** [DISCONTINUED] fentaNYL (SUBLIMAZE) 50 mcg/mL in 50mL (Continuous Infusion), , Intravenous, Continuous, Stopped at 08/26/21 0949 **AND** [DISCONTINUED] propofol (DIPRIVAN) injection, 0-80 mcg/kg/min, Intravenous, Continuous, Stopped at 08/26/21 0950 **AND** [CANCELED] Triglyceride, , , Every 3 Days (0300), Jerod M Gonda, M.D.  •  insulin regular (HumuLIN R,NovoLIN R) injection, 1-6 Units, Subcutaneous, Q6HRS **AND** POC blood glucose manual result, , , Q6H **AND** NOTIFY MD and PharmD, , , Once **AND** glucose 4 g chewable tablet 16 g, 16 g, Oral, Q15 MIN PRN **AND** dextrose 50% (D50W) injection 50 mL, 50 mL, Intravenous, Q15 MIN PRN, Jeremy M Gonda, M.D.  •  Respiratory Therapy Consult, , Nebulization, Continuous RT, Jeremy M Gonda, M.D.  •  LORazepam (ATIVAN)  injection 2 mg, 2 mg, Intravenous, Q5 MIN PRN, Jeremy M Gonda, M.D.  •  acetaminophen (Tylenol) tablet 650 mg, 650 mg, Enteral Tube, Q4HRS PRN, 650 mg at 08/26/21 0232 **OR** acetaminophen (TYLENOL) suppository 650 mg, 650 mg, Rectal, Q4HRS PRN, Jeremy M Gonda, M.D.  •  ondansetron (ZOFRAN ODT) dispertab 4 mg, 4 mg, Enteral Tube, Q4HRS PRN **OR** ondansetron (ZOFRAN) syringe/vial injection 4 mg, 4 mg, Intravenous, Q4HRS PRN, Jeremy M Gonda, M.D.  •  MD Alert...ICU Electrolyte Replacement per Pharmacy, , Other, PHARMACY TO DOSE, Jeremy M Gonda, M.D.  •  niCARdipine (CARDENE) 25 mg in  mL Infusion, 0-15 mg/hr, Intravenous, Continuous, Jeremy M Gonda, M.D., Last Rate: 100 mL/hr at 08/26/21 0840, 10 mg/hr at 08/26/21 0840  •  labetalol (NORMODYNE/TRANDATE) injection 10 mg, 10 mg, Intravenous, Q4HRS PRN, Jeremy M Gonda, M.D., 10 mg at 08/25/21 0935  •  hydrALAZINE (APRESOLINE) injection 10 mg, 10 mg, Intravenous, Q4HRS PRN, Jeremy M Gonda, M.D., 10 mg at 08/25/21 1031  •  enalaprilat (VASOTEC) injection 1.25 mg, 1.25 mg, Intravenous, Q6HRS PRN, Jeremy M Gonda, M.D.  •  promethazine (PHENERGAN) suppository 12.5-25 mg, 12.5-25 mg, Rectal, Q4HRS PRN, Jeremy M Gonda, M.D.  •  prochlorperazine (COMPAZINE) injection 5-10 mg, 5-10 mg, Intravenous, Q4HRS PRN, Jeremy M Gonda, M.D.  •  promethazine (PHENERGAN) tablet 12.5-25 mg, 12.5-25 mg, Enteral Tube, Q4HRS PRN, Bebo Bella M.D.    Review of systems: Unable to obtain secondary to intubation and sedation.     Physical Examination:     Vitals:    08/26/21 0630 08/26/21 0700 08/26/21 0800 08/26/21 0810   BP:       Pulse: 80 81 83 83   Resp: (!) 24 (!) 24 (!) 31 18   Temp:       TempSrc:   Bladder    SpO2: 97% 97% 98% 95%   Weight:       Height:           General: Patient in no acute distress, agitated with stimulation but redirectable, and cooperative.  HEENT: Normocephalic, no signs of acute trauma.   Neck: Supple, no meningeal signs or carotid bruits. There is normal  range of motion. No tenderness on exam.   Chest: Intubated on mechanical ventilation, crackles to diminished, regular and unlabored breaths. Productive cough of thick white secretions via Brown suctioning.   CV: RRR. -160s due to stimuli.   Skin: No signs of acute rashes or trauma.   Musculoskeletal: Joints exhibit full range of motion, without any pain to palpation. There are no signs of joint or muscle swelling. There is no tenderness to deep palpation of muscles.   Psychiatric: No hallucinatory behavior. Unable to verbalize symptoms of depression or suicidal ideation. Mood and affect appear tearful and anxious when stimulated, redirectable and returned to sedated with reassurance.     NEUROLOGICAL EXAM: *Propofol 40mcg/kg/min not paused given agitation and hypertension  Mental status, orientation: Intubated, sedated, eyes open to verbal stimuli, follows commands on with LUE and BLE. GCS E(3)V(NT)M(6).   Speech and language: Unable to assess as intubated.    Cranial nerve exam: Pupils are 2-3 mm bilaterally and equal, sluggishly reactive to light. Visual fields are intact by confrontation. EO with tracking of this practitioner. Face appears asymmetric with right facial droop. Unable to assess sensation in the face, uvula, palate, sternocleidomastoid muscles, or shoulder shurg secondary to sedation/intubation. Tongue is midline and without any signs of tongue biting or fasciculations.  Motor exam: Strength is antigravity to LUE, withdrawals to noxious stimuli to BLE, RUE hemiplegia. Tone is decreased to RUE. No abnormal movements were seen on exam.   Sensory exam No movement to RUE with noxious stimuli, withdrawals to noxious stimuli to LUE and BLE.   Deep tendon reflexes:  Plantar responses are equivocal.   Coordination: Deferred.   Gait: Deferred.     NIH Stroke Scale  8/26/2021    1a. Level of Consciousness (Alert, drowsy, etc): 1= Drowsy    1b. LOC Questions (Month, age): 2= Incorrect  *intubated    1c. LOC Commands (Open/close eyes make fist/let go): 0= Obeys both correctly    2.   Best Gaze (Eyes open - patient follows examiner's finger on face): 0= Normal    3.   Visual Fields (introduce visual stimulus/threat to patient's field quadrants): 0= No visual loss    4.   Facial Paresis (Show teeth, raise eyebrows and squeeze eyes shut): 2 = Partial     5a. Motor Arm - Left (Elevate arm to 90 degrees if patient is sitting, 45 degrees if  supine): 0= No drift    5b. Motor Arm - Right (Elevate arm to 90 degrees if patient is sitting, 45 degrees if supine): 4= No movement    6a. Motor Leg - Left (Elevate leg 30 degrees with patient supine): 1= Drift    6b. Motor Leg - Right  (Elevate leg 30 degrees with patient supine): 1= Drift    7.   Limb Ataxia (Finger-nose, heel down shin): 0= No ataxia    8.   Sensory (Pin prick to face, arm, trunk and leg - compare side to side): 1= Partial loss    9.  Best Language (Name item, describe a picture and read sentences): 3= Mute *intubated    10. Dysarthria (Evaluate speech clarity by patient repeating listed words): X= Intubated or other physical barrier    11. Extinction and Inattention (Use information from prior testing to identify neglect or  double simultaneous stimuli testing): 1= Partial neglect    Total NIH Score: 16    INTRACEREBRAL HEMORRHAGE SCORE:    ICH SCORE:  Sierra Coma Score:  5-12= 0  Age:  52 yo = 0  ICH Volume greater than 30cc =no= 0 13.8ml   Intraventricular Hemorrhage:  No = 0  Infratentorial Origin of Hemorrhage:  No = 0  ICH TOTAL SCORE:  1      ANCILLARY DATA REVIEWED:     Labs:  No results found for: PROTHROMBTM, INR   Lab Results   Component Value Date/Time    WBC 13.3 (H) 08/26/2021 06:00 AM    RBC 4.56 08/26/2021 06:00 AM    HEMOGLOBIN 12.9 08/26/2021 06:00 AM    HEMATOCRIT 38.6 08/26/2021 06:00 AM    MCV 84.6 08/26/2021 06:00 AM    MCH 28.3 08/26/2021 06:00 AM    MCHC 33.4 (L) 08/26/2021 06:00 AM    MPV 9.8 08/26/2021 06:00 AM     NEUTSPOLYS 84.10 (H) 08/26/2021 06:00 AM    LYMPHOCYTES 8.40 (L) 08/26/2021 06:00 AM    MONOCYTES 4.60 08/26/2021 06:00 AM    EOSINOPHILS 2.00 08/26/2021 06:00 AM    BASOPHILS 0.30 08/26/2021 06:00 AM      Lab Results   Component Value Date/Time    SODIUM 140 08/26/2021 06:00 AM    POTASSIUM 2.9 (L) 08/26/2021 06:00 AM    CHLORIDE 106 08/26/2021 06:00 AM    CO2 23 08/26/2021 06:00 AM    GLUCOSE 108 (H) 08/26/2021 06:00 AM    BUN 15 08/26/2021 06:00 AM    CREATININE 0.94 08/26/2021 06:00 AM      Lab Results   Component Value Date/Time    CHOLSTRLTOT 181 08/25/2021 02:19 AM    LDL 84 08/25/2021 02:19 AM    HDL 82 08/25/2021 02:19 AM    TRIGLYCERIDE 77 08/25/2021 02:19 AM       No results found for: ALKPHOSPHAT, ASTSGOT, ALTSGPT, TBILIRUBIN     Imaging/Testing:    I interpreted and/or reviewed the patient's neuroimaging    DX-CHEST-PORTABLE (1 VIEW)   Final Result      1.  Line and tube position as described above. No visible pneumothorax.   2.  LEFT basilar atelectasis or pneumonia      MR-BRAIN-WITH & W/O   Final Result      1.  Approximately 40 mm acute parenchymal hemorrhage at the left posterior frontal convexity. No abnormal vascular flow voids or underlying enhancing mass lesion.   2.  Encephalomalacic change in the left frontal deep white matter along the corona radiata and far posterior aspect of the left head of caudate nucleus consistent with old infarction.   3.  Moderate supratentorial white matter disease most consistent with microvascular ischemic change.   4.  Incidental 9 mm meningioma at the floor of the left middle cranial fossa.   5.  Old lacunar size infarct in the left paramedian hans.   6.  Mild-moderate pontine ischemic gliosis.      EC-ECHOCARDIOGRAM COMPLETE W/O CONT   Final Result      DX-ABDOMEN FOR TUBE PLACEMENT   Final Result      Feeding tube extends below the diaphragm with tip at the level of the proximal duodenum.      DX-CHEST-LIMITED (1 VIEW)   Final Result         1.  Line and tube  placements are noted as described above.      2.  No pneumothorax.      3.  Mild perihilar congestion.      4.  No consolidations identified.      CT-HEAD W/O   Final Result         1.  No significant change in left-sided parenchymal hemorrhage since prior CT.      2.  Minimal midline shift to the right side.      3.  No new intracranial hemorrhage is identified.         OUTSIDE IMAGES-CT HEAD   Final Result      CT-HEAD W/O    (Results Pending)         Assessment and Plan:    Heide Rangel is a 51 y.o. female with relevant history of COPD, HTN, kidney disease, and substance abuse who presented to [facility] ED on 8/25/21 with acute onset of slurred speech, right facial droop, and decreased sensation in the RUE/RLE. CT head wo contrast 8/25/21 revealed a left frontoparietal intraparenchymal hemorrhage (2.7 X 5.1 cm) and she was transferred to [facility] for higher level of care. ICH score of 1. Routine EEG was obtained given concern for seizure and found to be abnormal with left frontotemporal slowing correlating to the left IPH, but no seizures or epileptiform discharges were seen. MRI brain wo contrast redemonstrated the left posterior frontal IPH now ~4 cm, but no vascular flow void, underlying enhancing mass, or acute infarct noted. Additionally she has an incidental 9mm meningioma to the left middle cranial fossa, and chronic left basal ganglia infarct. 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      Plan:    - Neurosurgery evaluation and collaboration appreciated  - HOB at 30 degrees  - Hold all antiplatelets and anticoagulants  - No AED indicated at this time  - BP <140/90; nicardipine 10mg/hg currently   - SCD's for DVT ppx  - Maintain pCO2 of 35-40  - Keep euvolemic, euthermic, and normoglycemic (140-180)  - Follow up outpatient at  [facility]. Referral placed. Will need eventual antiplatelet therapy, but recommend waiting several months with repeat outpatient CT head to determine stability before starting.     No further recommendations or further studies from a neurological standpoint at this time. Please re-consult if you have further questions or there is a change in status.    The evaluation of the patient, and recommended management, was discussed with Dr. Munroe, Dr. Bella, Dr. Munoz, Maegan Stuart, APRN Neurosurgery, and Evelyn, bedside RN.     JAMES Paulino.   Nurse Practitioner, Neurohospitalist  [facility]  t) [phone] (f) [phone]

## 2021-08-26 NOTE — PROGRESS NOTES
Medication Reconciliation     Medication reconciliation complete with patient's home pharmacy, provided by patient's daughter. Per pharmacy records patient with no recent medication refills. Listed below are prior home medications.    Medications last filled for a 30 day supply in April 2021:  · Amlodipine 10mg PO daily  · Metoprolol Succinate 50mg PO daily       Ruby Coombs, PharmD, BCCCP

## 2021-08-27 ENCOUNTER — APPOINTMENT (OUTPATIENT)
Dept: RADIOLOGY | Facility: MEDICAL CENTER | Age: 51
DRG: 064 | End: 2021-08-27
Attending: INTERNAL MEDICINE
Payer: MEDICAID

## 2021-08-27 PROBLEM — Z72.0 TOBACCO ABUSE: Status: ACTIVE | Noted: 2021-08-27

## 2021-08-27 PROBLEM — F19.90 DRUG USE: Status: RESOLVED | Noted: 2021-08-25 | Resolved: 2021-08-27

## 2021-08-27 PROBLEM — F15.10 METHAMPHETAMINE ABUSE (HCC): Status: ACTIVE | Noted: 2021-08-27

## 2021-08-27 LAB
ANION GAP SERPL CALC-SCNC: 12 MMOL/L (ref 7–16)
BASOPHILS # BLD AUTO: 0.4 % (ref 0–1.8)
BASOPHILS # BLD: 0.03 K/UL (ref 0–0.12)
BUN SERPL-MCNC: 20 MG/DL (ref 8–22)
CALCIUM SERPL-MCNC: 8.3 MG/DL (ref 8.5–10.5)
CHLORIDE SERPL-SCNC: 103 MMOL/L (ref 96–112)
CO2 SERPL-SCNC: 24 MMOL/L (ref 20–33)
CREAT SERPL-MCNC: 0.76 MG/DL (ref 0.5–1.4)
EOSINOPHIL # BLD AUTO: 0.23 K/UL (ref 0–0.51)
EOSINOPHIL NFR BLD: 2.7 % (ref 0–6.9)
ERYTHROCYTE [DISTWIDTH] IN BLOOD BY AUTOMATED COUNT: 46.7 FL (ref 35.9–50)
GLUCOSE SERPL-MCNC: 120 MG/DL (ref 65–99)
HCT VFR BLD AUTO: 35.4 % (ref 37–47)
HGB BLD-MCNC: 11.7 G/DL (ref 12–16)
IMM GRANULOCYTES # BLD AUTO: 0.03 K/UL (ref 0–0.11)
IMM GRANULOCYTES NFR BLD AUTO: 0.4 % (ref 0–0.9)
LYMPHOCYTES # BLD AUTO: 1.29 K/UL (ref 1–4.8)
LYMPHOCYTES NFR BLD: 15.2 % (ref 22–41)
MAGNESIUM SERPL-MCNC: 2 MG/DL (ref 1.5–2.5)
MCH RBC QN AUTO: 28 PG (ref 27–33)
MCHC RBC AUTO-ENTMCNC: 33.1 G/DL (ref 33.6–35)
MCV RBC AUTO: 84.7 FL (ref 81.4–97.8)
MONOCYTES # BLD AUTO: 0.45 K/UL (ref 0–0.85)
MONOCYTES NFR BLD AUTO: 5.3 % (ref 0–13.4)
NEUTROPHILS # BLD AUTO: 6.43 K/UL (ref 2–7.15)
NEUTROPHILS NFR BLD: 76 % (ref 44–72)
NRBC # BLD AUTO: 0 K/UL
NRBC BLD-RTO: 0 /100 WBC
PHOSPHATE SERPL-MCNC: 3.2 MG/DL (ref 2.5–4.5)
PLATELET # BLD AUTO: 160 K/UL (ref 164–446)
PMV BLD AUTO: 10.6 FL (ref 9–12.9)
POTASSIUM SERPL-SCNC: 3.2 MMOL/L (ref 3.6–5.5)
RBC # BLD AUTO: 4.18 M/UL (ref 4.2–5.4)
SODIUM SERPL-SCNC: 139 MMOL/L (ref 135–145)
WBC # BLD AUTO: 8.5 K/UL (ref 4.8–10.8)

## 2021-08-27 PROCEDURE — 99255 IP/OBS CONSLTJ NEW/EST HI 80: CPT | Performed by: HOSPITALIST

## 2021-08-27 PROCEDURE — 700111 HCHG RX REV CODE 636 W/ 250 OVERRIDE (IP): Performed by: INTERNAL MEDICINE

## 2021-08-27 PROCEDURE — 84100 ASSAY OF PHOSPHORUS: CPT

## 2021-08-27 PROCEDURE — 700102 HCHG RX REV CODE 250 W/ 637 OVERRIDE(OP): Performed by: GENERAL PRACTICE

## 2021-08-27 PROCEDURE — A9270 NON-COVERED ITEM OR SERVICE: HCPCS | Performed by: GENERAL PRACTICE

## 2021-08-27 PROCEDURE — A9270 NON-COVERED ITEM OR SERVICE: HCPCS | Performed by: INTERNAL MEDICINE

## 2021-08-27 PROCEDURE — 700102 HCHG RX REV CODE 250 W/ 637 OVERRIDE(OP): Performed by: INTERNAL MEDICINE

## 2021-08-27 PROCEDURE — 700102 HCHG RX REV CODE 250 W/ 637 OVERRIDE(OP): Performed by: HOSPITALIST

## 2021-08-27 PROCEDURE — 94640 AIRWAY INHALATION TREATMENT: CPT

## 2021-08-27 PROCEDURE — 97163 PT EVAL HIGH COMPLEX 45 MIN: CPT

## 2021-08-27 PROCEDURE — 97166 OT EVAL MOD COMPLEX 45 MIN: CPT

## 2021-08-27 PROCEDURE — 700102 HCHG RX REV CODE 250 W/ 637 OVERRIDE(OP): Performed by: STUDENT IN AN ORGANIZED HEALTH CARE EDUCATION/TRAINING PROGRAM

## 2021-08-27 PROCEDURE — 700101 HCHG RX REV CODE 250: Performed by: INTERNAL MEDICINE

## 2021-08-27 PROCEDURE — 83735 ASSAY OF MAGNESIUM: CPT

## 2021-08-27 PROCEDURE — 99233 SBSQ HOSP IP/OBS HIGH 50: CPT | Performed by: PHYSICAL MEDICINE & REHABILITATION

## 2021-08-27 PROCEDURE — 85025 COMPLETE CBC W/AUTO DIFF WBC: CPT

## 2021-08-27 PROCEDURE — A9270 NON-COVERED ITEM OR SERVICE: HCPCS | Performed by: STUDENT IN AN ORGANIZED HEALTH CARE EDUCATION/TRAINING PROGRAM

## 2021-08-27 PROCEDURE — 80048 BASIC METABOLIC PNL TOTAL CA: CPT

## 2021-08-27 PROCEDURE — 92610 EVALUATE SWALLOWING FUNCTION: CPT

## 2021-08-27 PROCEDURE — A9270 NON-COVERED ITEM OR SERVICE: HCPCS | Performed by: HOSPITALIST

## 2021-08-27 PROCEDURE — 770020 HCHG ROOM/CARE - TELE (206)

## 2021-08-27 PROCEDURE — 99233 SBSQ HOSP IP/OBS HIGH 50: CPT | Mod: GC | Performed by: INTERNAL MEDICINE

## 2021-08-27 PROCEDURE — 71045 X-RAY EXAM CHEST 1 VIEW: CPT

## 2021-08-27 RX ORDER — NICOTINE 21 MG/24HR
21 PATCH, TRANSDERMAL 24 HOURS TRANSDERMAL
Status: DISCONTINUED | OUTPATIENT
Start: 2021-08-27 | End: 2021-08-29

## 2021-08-27 RX ORDER — LABETALOL 200 MG/1
200 TABLET, FILM COATED ORAL TWICE DAILY
Status: DISCONTINUED | OUTPATIENT
Start: 2021-08-27 | End: 2021-08-28

## 2021-08-27 RX ORDER — OXYCODONE HYDROCHLORIDE 5 MG/1
5 TABLET ORAL EVERY 4 HOURS PRN
Status: DISCONTINUED | OUTPATIENT
Start: 2021-08-27 | End: 2021-08-28

## 2021-08-27 RX ORDER — ENALAPRILAT 1.25 MG/ML
1.25 INJECTION INTRAVENOUS EVERY 6 HOURS PRN
Status: DISCONTINUED | OUTPATIENT
Start: 2021-08-27 | End: 2021-08-30 | Stop reason: HOSPADM

## 2021-08-27 RX ORDER — IPRATROPIUM BROMIDE AND ALBUTEROL SULFATE 2.5; .5 MG/3ML; MG/3ML
3 SOLUTION RESPIRATORY (INHALATION)
Status: DISCONTINUED | OUTPATIENT
Start: 2021-08-27 | End: 2021-08-30

## 2021-08-27 RX ADMIN — POTASSIUM BICARBONATE 25 MEQ: 978 TABLET, EFFERVESCENT ORAL at 17:14

## 2021-08-27 RX ADMIN — POTASSIUM BICARBONATE 25 MEQ: 978 TABLET, EFFERVESCENT ORAL at 13:05

## 2021-08-27 RX ADMIN — AMLODIPINE BESYLATE 10 MG: 10 TABLET ORAL at 05:11

## 2021-08-27 RX ADMIN — HYDRALAZINE HYDROCHLORIDE 10 MG: 20 INJECTION INTRAMUSCULAR; INTRAVENOUS at 13:19

## 2021-08-27 RX ADMIN — OXYCODONE 5 MG: 5 TABLET ORAL at 08:06

## 2021-08-27 RX ADMIN — NICOTINE TRANSDERMAL SYSTEM 21 MG: 21 PATCH, EXTENDED RELEASE TRANSDERMAL at 15:37

## 2021-08-27 RX ADMIN — OXYCODONE 5 MG: 5 TABLET ORAL at 12:00

## 2021-08-27 RX ADMIN — OXYCODONE 5 MG: 5 TABLET ORAL at 17:13

## 2021-08-27 RX ADMIN — LABETALOL HYDROCHLORIDE 200 MG: 200 TABLET, FILM COATED ORAL at 15:37

## 2021-08-27 RX ADMIN — OXYCODONE 5 MG: 5 TABLET ORAL at 04:19

## 2021-08-27 RX ADMIN — IPRATROPIUM BROMIDE AND ALBUTEROL SULFATE 3 ML: .5; 2.5 SOLUTION RESPIRATORY (INHALATION) at 06:32

## 2021-08-27 RX ADMIN — ACETAMINOPHEN 650 MG: 325 TABLET, FILM COATED ORAL at 03:35

## 2021-08-27 RX ADMIN — ENALAPRILAT 1.25 MG: 1.25 INJECTION INTRAVENOUS at 20:48

## 2021-08-27 RX ADMIN — ACETAMINOPHEN 650 MG: 325 TABLET, FILM COATED ORAL at 15:36

## 2021-08-27 RX ADMIN — POTASSIUM BICARBONATE 25 MEQ: 978 TABLET, EFFERVESCENT ORAL at 09:21

## 2021-08-27 RX ADMIN — DOCUSATE SODIUM 50 MG AND SENNOSIDES 8.6 MG 2 TABLET: 8.6; 5 TABLET, FILM COATED ORAL at 05:10

## 2021-08-27 RX ADMIN — IPRATROPIUM BROMIDE AND ALBUTEROL SULFATE 3 ML: .5; 2.5 SOLUTION RESPIRATORY (INHALATION) at 19:20

## 2021-08-27 RX ADMIN — IPRATROPIUM BROMIDE AND ALBUTEROL SULFATE 3 ML: .5; 2.5 SOLUTION RESPIRATORY (INHALATION) at 13:56

## 2021-08-27 RX ADMIN — ENALAPRILAT 1.25 MG: 1.25 INJECTION INTRAVENOUS at 13:04

## 2021-08-27 RX ADMIN — ACETAMINOPHEN 650 MG: 325 TABLET, FILM COATED ORAL at 20:23

## 2021-08-27 RX ADMIN — DOCUSATE SODIUM 50 MG AND SENNOSIDES 8.6 MG 2 TABLET: 8.6; 5 TABLET, FILM COATED ORAL at 17:14

## 2021-08-27 RX ADMIN — LABETALOL HYDROCHLORIDE 10 MG: 5 INJECTION INTRAVENOUS at 22:47

## 2021-08-27 RX ADMIN — LABETALOL HYDROCHLORIDE 10 MG: 5 INJECTION INTRAVENOUS at 12:00

## 2021-08-27 RX ADMIN — HYDRALAZINE HYDROCHLORIDE 10 MG: 20 INJECTION INTRAMUSCULAR; INTRAVENOUS at 19:53

## 2021-08-27 RX ADMIN — ACETAMINOPHEN 650 MG: 325 TABLET, FILM COATED ORAL at 08:06

## 2021-08-27 RX ADMIN — METOPROLOL TARTRATE 12.5 MG: 25 TABLET, FILM COATED ORAL at 09:21

## 2021-08-27 RX ADMIN — FAMOTIDINE 20 MG: 20 TABLET ORAL at 05:10

## 2021-08-27 ASSESSMENT — COGNITIVE AND FUNCTIONAL STATUS - GENERAL
STANDING UP FROM CHAIR USING ARMS: A LITTLE
SUGGESTED CMS G CODE MODIFIER MOBILITY: CL
MOVING FROM LYING ON BACK TO SITTING ON SIDE OF FLAT BED: UNABLE
DRESSING REGULAR UPPER BODY CLOTHING: A LITTLE
EATING MEALS: A LITTLE
PERSONAL GROOMING: A LITTLE
WALKING IN HOSPITAL ROOM: A LITTLE
MOVING TO AND FROM BED TO CHAIR: A LOT
DAILY ACTIVITIY SCORE: 18
SUGGESTED CMS G CODE MODIFIER DAILY ACTIVITY: CK
TURNING FROM BACK TO SIDE WHILE IN FLAT BAD: A LOT
DRESSING REGULAR LOWER BODY CLOTHING: A LITTLE
CLIMB 3 TO 5 STEPS WITH RAILING: A LOT
TOILETING: A LITTLE
MOBILITY SCORE: 13
HELP NEEDED FOR BATHING: A LITTLE

## 2021-08-27 ASSESSMENT — ENCOUNTER SYMPTOMS
DIZZINESS: 0
ABDOMINAL PAIN: 0
VOMITING: 0
SHORTNESS OF BREATH: 0
DOUBLE VISION: 0
BLURRED VISION: 0
CARDIOVASCULAR NEGATIVE: 1
HEADACHES: 1
NAUSEA: 0
PALPITATIONS: 0
EYES NEGATIVE: 1

## 2021-08-27 ASSESSMENT — GAIT ASSESSMENTS
ASSISTIVE DEVICE: OTHER (COMMENTS)
DISTANCE (FEET): 10
DISTANCE (FEET): 10
DEVIATION: DECREASED BASE OF SUPPORT;BRADYKINETIC;SHUFFLED GAIT;DECREASED HEEL STRIKE;DECREASED TOE OFF
GAIT LEVEL OF ASSIST: MINIMAL ASSIST

## 2021-08-27 ASSESSMENT — PAIN DESCRIPTION - PAIN TYPE
TYPE: ACUTE PAIN

## 2021-08-27 ASSESSMENT — ACTIVITIES OF DAILY LIVING (ADL): TOILETING: INDEPENDENT

## 2021-08-27 NOTE — THERAPY
Physical Therapy   Initial Evaluation     Patient Name: Heide Rangel  Age:  51 y.o., Sex:  female  Medical Record #: [id number]  Today's Date: 8/27/2021     Precautions  Precautions: Fall Risk;Nasogastric Tube  Comments: BP goal <140/90 2' to recent IPH    Assessment  Pt presents to PT with impaired motor control, functional strength, balance, gait and general locomotion s/p recent IPH. She was able to demonstrate short distance ambulation with HHA/min A and consistent cueing/facilitation of mechanics. She is notably improved with re: R sided movement/mobility as well as cognition/alertness since attempt yesterday. Will continue to visit with details/recs below.     Plan    Recommend Physical Therapy 5 times per week until therapy goals are met     DC Equipment Recommendations: Unable to determine at this time  Discharge Recommendations: Recommend post-acute placement for additional physical therapy services prior to discharge home        08/27/21 1037   Prior Living Situation   Prior Services Home-Independent;None   Housing / Facility 1 Story House   Steps Into Home 2   Steps In Home 0   Bathroom Set up Walk In Shower;Bathtub / Shower Combination;Grab Bars;Shower Chair   Equipment Owned Quad Cane   Lives with - Patient's Self Care Capacity Adult Children;Child Less than 18 Years of Age;Unrelated Adult   Comments Pt is planning to live with her daughter from now on. Home setup above reflects her daughters home. In daughters home she will be living also with son-in-law and granddaughters. Her daughter does work during the day so pt will need to be able to do basic self care tasks and mobility independently   Prior Level of Functional Mobility   Bed Mobility Independent   Transfer Status Independent   Ambulation Independent   Distance Ambulation (Feet)   (community)   Assistive Devices Used None   Stairs Independent   Comments I wtih IADls and ADls prior   Cognition    Cognition / Consciousness X   Speech/ Communication  Expressive Aphasia;Nods Appropriately   Level of Consciousness Alert   Safety Awareness Impaired;Impulsive   Comments very pleasant and cooperative; able to make needs known; self directed at times    Passive ROM Lower Body   Passive ROM Lower Body WDL   Active ROM Lower Body    Active ROM Lower Body  X   Comments LLE grossly WFL; RLE grossly WFL though increased effort (UE appears more affected)   Strength Lower Body   Lower Body Strength  X   Comments as above; grossly WFL though some breakaway weakness at RLE (though appears when distracted and 2' to motor control < strength)   Sensation Lower Body   Lower Extremity Sensation   X   Comments appears to be slighlty diminished at RLE >LLEthough given aphasia difficult assesment   Balance Assessment   Sitting Balance (Static) Fair +   Sitting Balance (Dynamic) Fair   Standing Balance (Static) Poor +   Standing Balance (Dynamic) Poor +   Weight Shift Sitting Fair   Weight Shift Standing Poor   Comments no josselyn LOB during ambulation, though was reliant on HHA and intermittent physical assist for mechanics/weightshifting and environment awareness   Gait Analysis   Gait Level Of Assist Minimal Assist   Assistive Device Other (Comments)  (HHA)   Distance (Feet) 10   # of Times Distance was Traveled 1   Deviation Decreased Base Of Support;Bradykinetic;Shuffled Gait;Decreased Heel Strike;Decreased Toe Off   # of Stairs Climbed 0   Weight Bearing Status no restrictions   Comments see balance   Bed Mobility    Comments in chair pre/post   Functional Mobility   Sit to Stand Minimal Assist   Bed, Chair, Wheelchair Transfer Minimal Assist   Transfer Method Stand Step   Mobility iwth HHA and facilitation   ICU Target Mobility Level   ICU Mobility - Targeted Level Level 4   How much difficulty does the patient currently have...   Turning over in bed (including adjusting bedclothes, sheets and blankets)? 2   Sitting down on and standing up from a chair with arms (e.g., wheelchair,  bedside commode, etc.) 1   Moving from lying on back to sitting on the side of the bed? 2   How much help from another person does the patient currently need...   Moving to and from a bed to a chair (including a wheelchair)? 3   Need to walk in a hospital room? 3   Climbing 3-5 steps with a railing? 2   6 clicks Mobility Score 13   Activity Tolerance   Sitting in Chair functional   Sitting Edge of Bed NT   Standing at least 3 mins   Comments no overt/acute fatigue; tolerates well   Edema / Skin Assessment   Edema / Skin  Not Assessed   Patient / Family Goals    Patient / Family Goal #1 to return to PLOF   Short Term Goals    Short Term Goal # 1 pt will be able to perform sit<>stand/transfers with FWW with SPC with Spv in 6tx to promote fnx progression towards I    Short Term Goal # 2 pt will be able to perform sit<>stand/transfers with SPC with Spv in 6tx to promote fnx progression towards i    Short Term Goal # 3 pt will be able to ambulate 150ft with SPC with SPv in 6tx to promote fnx progression towards I    Short Term Goal # 4 pt will be able to ambulate up/down 2 steps with min A and SPC in 6tx to promote fnx progression towards I    Education Group   Education Provided Role of Physical Therapist   Role of Physical Therapist Patient Response Patient;Family;Acceptance;Explanation;Verbal Demonstration   Additional Comments education re: PT POC, activity recs and continued use of RUE to promote motor control, functional return

## 2021-08-27 NOTE — DISCHARGE PLANNING
[facility] Transitional Care Coordination    Follow up for Rehab.  Seeking authorization for inpatient rehab level care.  Case submitted via [employer] website to [employer].  REf #[id number]; tracking #[id number].  Will follow for auth determination.

## 2021-08-27 NOTE — DISCHARGE PLANNING
Hospital Care Management Team Assessment    In the case of an emergency, pt's NOK is Deya Olvera (Daughter) [phone].     This RNCM spoke with the patient's daughter, Deya, at the bedside and obtained the information used in this assessment. Deya verified accuracy of facesheet. Deya states Pt will be moving in with her on D/C to address: [address] [place] [address]. [place] [address]. Deya's home is a one story house.     Pt previously lived in a one story house with a roommate. Pt uses the [facility] on [facility]. Prior to current hospitalization, pt was completely independent with ADLS/IADLS. Pt has a nebulizer at home. Pt would drive or get rides from friends/ family to and from doctors appointments. Pt is unemployed and might receive unemployment, per Deya. Pt has prescription coverage. Pt has current substance abuse (positive for marijuana and amphetamines on admission). Pt has no mental health history. Deya declines need for any resources. Pt has strong support from daughter and friends. Pt has no AD. Pt's discharge plan is to be determined, PT/OT evals pending. Physiatry following.      Care Transition Team Assessment    Information Source  Orientation Level: Oriented to time, Oriented to place, Oriented to person, Other (Comment) (nods head yes/no appropriately)  Information Given By: Relative (daughter)  Informant's Name: Deya Olvera  Who is responsible for making decisions for patient? : Patient    Readmission Evaluation  Is this a readmission?: No    Elopement Risk  Legal Hold: No  Ambulatory or Self Mobile in Wheelchair: No-Not an Elopement Risk  Elopement Risk: Not at Risk for Elopement    Interdisciplinary Discharge Planning  Lives with - Patient's Self Care Capacity: Adult Children  Support Systems: Family Member(s), Friends / Neighbors, Children  Housing / Facility: [address] [facility]  Prior Services: Home-Independent  Durable Medical Equipment: Not Applicable    Discharge  Preparedness  What is your plan after discharge?: Uncertain - pending medical team collaboration (likely IRF vs SNF)  What are your discharge supports?: Child  Prior Functional Level: Ambulatory, Drives Self, Independent with Activities of Daily Living  Difficulity with ADLs: None  Difficulity with IADLs: None    Functional Assesment  Prior Functional Level: Ambulatory, Drives Self, Independent with Activities of Daily Living    Finances  Financial Barriers to Discharge: No  Prescription Coverage: Yes    Advance Directive  Advance Directive?: Clinically incapacitated / Inappropriate, None    Psychological Assessment  History of Substance Abuse: Marijuana, Amphetamines  Date Last Used - Amphetamines: present on admission   Date Last Used - Marijuana: present on admission   Substance Abuse Comments: family declines need for resources at this time  History of Psychiatric Problems: No  Newly Diagnosed Illness: Yes    Anticipated Discharge Information  Discharge Disposition: Still a Patient (30)

## 2021-08-27 NOTE — ASSESSMENT & PLAN NOTE
She was started on norvasc and metoprolol  Her BP has remained quite elevated thus change metoprolol to labetalol 200 mg BID  IV prns available  8/28 blood pressure is still not well controlled.  We will add lisinopril.  Titrate up as needed  8/29 increase dose of lisinopril to 10 mg once a day, continue amlodipine, labetalol  8/30 increase dose of lisinopril to 20 mg once a day

## 2021-08-27 NOTE — PROGRESS NOTES
[facility] ICU Progress Note      Admit Date: 8/25/2021    Resident(s): Giovani Hogan Jr., M.D.  Attending: BRADLEY [place]/ Dr. Bella    Date & Time:   8/27/2021   1:14 PM       Patient ID:    Name:             Heide Rangel     YOB: 1970  Age:                 51 y.o.  female   MRN:               [id number]      Hospital Course: 51 year old female w/ history of HTN,CKD, transfer [facility] for [facility] hemorrhage. Presented with dysarthria, R facial droop, decreased sensation right side with /130. CT found left parietal intraparenchymal hemorrhage. Intubated. Presented with hypertensive emergency improved on Nicardipine drip with improvement and transitioned to oral Amlodipine. Echo w/ bubble study unremarkable. 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 Neurosurgery recommends no intervention at this time, no seizure prophylaxis indicated now. Extubated 8/26/21.    Consultants:  Critical Care  Neurosurgery  Neurology      Interval Events:  Overnight, no acute events. BP improved. Has mild headache, otherwise no complaints. Extubated 8/26/21. Will wean off HFNC Neurosurgery has signed off, no Keppra indicated at this time. Decreased Predex. BP improved, will add Metoprolol Tartrate.     Review of Systems   Eyes: Negative.  Negative for blurred vision and double vision.   Respiratory: Negative for shortness of breath.    Cardiovascular: Negative.  Negative for chest pain and palpitations.   Gastrointestinal: Negative for abdominal pain, nausea and vomiting.   Neurological: Positive for headaches. Negative for dizziness.       PHYSICAL EXAM  Vitals:    08/27/21 1100 08/27/21 1200 08/27/21 1215 08/27/21 1230   BP: 153/84 (!) 194/111 (!) 161/85 155/61   Pulse: 69 78     Resp: (!) 46 (!) 46     Temp:  37.2 °C (99 °F)     TempSrc:  Temporal     SpO2: 97% 91%     Weight:       Height:         Body mass index is 30.45 kg/m².  BP  "155/61   Pulse 78   Temp 37.2 °C (99 °F) (Temporal)   Resp (!) 46   Ht 1.702 m (5' 7\")   Wt 88.2 kg (194 lb 7.1 oz)   SpO2 91%   BMI 30.45 kg/m²   O2 therapy: Pulse Oximetry: 91 %, O2 (LPM): 0.5, O2 Delivery Device: Nasal Cannula    Physical Exam  Vitals and nursing note reviewed.   Constitutional:       General: lying in bed intubated     Appearance: She is normal weight. She is ill-appearing.      Interventions: She is sedated, intubated and restrained.       HENT:      Head: Normocephalic.      Nose: Nose normal. No congestion.      Mouth/Throat:      Mouth: Mucous membranes are moist.      Pharynx: Oropharynx is clear.   Eyes:      General: No scleral icterus.     Conjunctiva/sclera: Conjunctivae normal.      Pupils: Pupils are equal, round, and reactive to light.   Neck:      Comments:R IJ line: clean/dry/intact  Cardiovascular:      Rate and Rhythm: Regular rate and rhythm       Pulses: Normal pulses.      Heart sounds: Normal heart sounds. No murmur heard.         Pulmonary:      Breath sounds: bilateral mild rhonchi mid to upper lungs, no wheezes, rales, rubs     Abdominal:      General: Bowel sounds are normal. There is no distension.      Palpations: Abdomen is soft.      Tenderness: There is no abdominal tenderness. There is no guarding.   Genitourinary:     Comments: Dai catheter in place-yellowish urine  Musculoskeletal:         General: No tenderness.      Cervical back: Neck supple. No rigidity.      Right lower leg: No edema.      Left lower leg: No edema.   Skin:     General: Skin is warm and dry.      Capillary Refill: Capillary refill takes less than 2 seconds.      Coloration: Skin is not pale.   Neurological:      Comments: follows commands, awake, difficult to vocalize still, but has 5/5 strength LUE/LLE, and 4/5 in RUE/RLE.  Respiratory:     Respiration: (!) 46, Pulse Oximetry: 91 %    Chest Tube Drains:    Recent Labs     08/25/21  0152 08/26/21  0315   ISTATAPH 7.331* 7.372* "   ISTATAPCO2 56.1* 37.5*   ISTATAPO2 320* 70   ISTATATCO2 31 23   GBMFEVB0LEG 100* 93   ISTATARTHCO3 29.7* 21.8   ISTATARTBE 2 -3   ISTATTEMP see below 100.0 F   ISTATFIO2 100 40   ISTATSPEC Arterial Arterial   ISTATAPHTC  --  7.361*   GYLCHKDJ1LW  --  73       HemoDynamics:  Pulse: 78 Blood Pressure: 155/61      Date 21 0700 - 21 0659   Shift 1705-6265 4310-2097 8594-1654 24 Hour Total   INTAKE   I.V. 16.5   16.5     Precedex Volume 16.5   16.5   NG/   270     Intake (mL) (Enteral Tube 21 Cortrak - Gastric Right nare) 270   270   Enteral 220   220     Free Water / Tube Flush 220   220   Shift Total 506.5   506.5   OUTPUT   Urine 310   310     Number of Times Voided 3 x   3 x     Urine Void (mL) 175   175     Output (mL) ([REMOVED] Urethral Catheter) 135   135   Shift Total 310   310   .5   196.5        Intake/Output Summary (Last 24 hours) at 2021 0632  Last data filed at 2021 0600  Gross per 24 hour   Intake 2936.21 ml   Output 1075 ml   Net 1861.21 ml       Weight: 88.2 kg (194 lb 7.1 oz)  Body mass index is 30.45 kg/m².    Recent Labs     21  0600 21  1240 21  0430   SODIUM 140 138 139   POTASSIUM 2.9*  --  3.2*   CHLORIDE 106  --  103   CO2 23  --  24   BUN 15  --  20   CREATININE 0.94  --  0.76   MAGNESIUM 1.7  --  2.0   PHOSPHORUS 3.2  --  3.2   CALCIUM 7.9*  --  8.3*       GI/Nutrition:  Recent Labs     21  0600 21  0430   PREALBUMIN 17.4*  --    GLUCOSE 108* 120*       Heme:  Recent Labs     21  0600 21  0430   RBC 4.56 4.18*   HEMOGLOBIN 12.9 11.7*   HEMATOCRIT 38.6 35.4*   PLATELETCT 189 160*       Infectious Disease:  Monitored Temp 2  Av.9 °C (100.3 °F)  Min: 37 °C (98.6 °F)  Max: 38.4 °C (101.1 °F)  Temp  Av.2 °C (99 °F)  Min: 37.2 °C (99 °F)  Max: 37.2 °C (99 °F)  Recent Labs     21  0600 21  0430   WBC 13.3* 8.5   NEUTSPOLYS 84.10* 76.00*   LYMPHOCYTES 8.40* 15.20*   MONOCYTES 4.60 5.30    EOSINOPHILS 2.00 2.70   BASOPHILS 0.30 0.40       Meds:  • potassium bicarbonate  25 mEq     • enalaprilat  1.25 mg     • metoprolol tartrate  12.5 mg     • oxyCODONE immediate-release  5 mg     • LR   Stopped (08/26/21 1500)   • dexmedetomidine (PRECEDEX) infusion  0.1-1.5 mcg/kg/hr Stopped (08/27/21 0915)   • ipratropium-albuterol  3 mL     • amLODIPine  10 mg     • senna-docusate  2 Tablet      And   • polyethylene glycol/lytes  1 Packet      And   • magnesium hydroxide  30 mL      And   • bisacodyl  10 mg     • Pharmacy  1 Each     • Respiratory Therapy Consult       • LORazepam  2 mg     • acetaminophen  650 mg      Or   • acetaminophen  650 mg     • ondansetron  4 mg      Or   • ondansetron  4 mg     • MD Alert...Adult ICU Electrolyte Replacement per Pharmacy       • niCARdipine infusion  0-15 mg/hr Stopped (08/26/21 1830)   • labetalol  10 mg     • hydrALAZINE  10 mg     • promethazine  12.5-25 mg     • prochlorperazine  5-10 mg     • promethazine  12.5-25 mg              Imaging:  DX-CHEST-PORTABLE (1 VIEW)   Final Result      1.  Lower lung volumes and increased atelectasis following extubation   2.  New patchy opacity in the LEFT mid lung could be atelectasis or early pneumonia      DX-CHEST-PORTABLE (1 VIEW)   Final Result      1.  Line and tube position as described above. No visible pneumothorax.   2.  LEFT basilar atelectasis or pneumonia      MR-BRAIN-WITH & W/O   Final Result      1.  Approximately 40 mm acute parenchymal hemorrhage at the left posterior frontal convexity. No abnormal vascular flow voids or underlying enhancing mass lesion.   2.  Encephalomalacic change in the left frontal deep white matter along the corona radiata and far posterior aspect of the left head of caudate nucleus consistent with old infarction.   3.  Moderate supratentorial white matter disease most consistent with microvascular ischemic change.   4.  Incidental 9 mm meningioma at the floor of the left middle cranial  fossa.   5.  Old lacunar size infarct in the left paramedian hans.   6.  Mild-moderate pontine ischemic gliosis.      EC-ECHOCARDIOGRAM COMPLETE W/O CONT   Final Result      DX-ABDOMEN FOR TUBE PLACEMENT   Final Result      Feeding tube extends below the diaphragm with tip at the level of the proximal duodenum.      DX-CHEST-LIMITED (1 VIEW)   Final Result         1.  Line and tube placements are noted as described above.      2.  No pneumothorax.      3.  Mild perihilar congestion.      4.  No consolidations identified.      CT-HEAD W/O   Final Result         1.  No significant change in left-sided parenchymal hemorrhage since prior CT.      2.  Minimal midline shift to the right side.      3.  No new intracranial hemorrhage is identified.         OUTSIDE IMAGES-CT HEAD   Final Result          Assessment and Plan:      * Nontraumatic cortical hemorrhage of left cerebral hemisphere (HCC)  Assessment & Plan  Likely hypertension induced with right-sided deficits vs AV fistula vs underlying mass versus amyloid. Echo reveals: EF 70%, no significant valve abnormalities. No evidence of right to left shunting with bubble study with no identified cardioembolic source.  -MRI Brain demonstrates:  40 mm acute parenchymal hemorrhage at the left posterior frontal convexity. No abnormal vascular flow voids or underlying enhancing mass lesion. Encephalomalacic change in the left frontal deep white matter along the corona radiata and far posterior aspect of the left head of caudate nucleus consistent with old infarction.  -EEG:No seizures captured    No ASA or anticoagulants  No need for keppra  Keep SBP less than 140  Follow up with Neurology with MRI Brain +/- contrast  in 6 weeks   Keep eunatremic  Neurosurgery signed off    Hypertensive emergency  Assessment & Plan  Resolved  -goal <140/90  -stopped Nicardipine drip  -continue Amlodipine 10mg home dose  -hold home Toprol XL  -start Metoprolol tartrate 12.5mg BID  -PRN  labetalol, hydralazine, Vasotec  -CTM      Acute respiratory failure with hypoxia (HCC)  Assessment & Plan  Extubated 8/26/21.  -stopped Propofol  -wean Precedex  -versed an Fentanyl for agitation and sedation  -wean off HFNC      Chronic kidney disease  Assessment & Plan  Stable  -avoid nephrotoxins  -renally dose medications  -strict I/0's  -daily renal function      Drug use  Assessment & Plan  Amphetamines and THC positive on urine drug screen  Additional information to be gathered from family  Eventual drug cessation education to be provided when appropriate    Acute pulmonary edema (HCC)  Assessment & Plan  resolved      DISPO: ICU for 1-2 days     CODE STATUS: Full      Quality Measures:  Dai Catheter: yes  DVT Prophylaxis: SCD  Ulcer Prophylaxis: Famotidine  Antibiotics: none  Lines: R IJ, PIV, right radial arterial line

## 2021-08-27 NOTE — PROGRESS NOTES
Physical Medicine and Rehabilitation Consultation  Follow up note             Date of initial consultation: 8/25/2021  Requesting provider: Jeremy Gonda, MD   Consulting provider: Mi Issa D.O.  Reason for consultation: assess for acute inpatient rehab appropriateness  LOS: 2 Day(s)    Chief complaint: Acute onset of slurred speech and right-sided facial droop    HPI: The patient is a 51 y.o.  female with a past medical history of hypertension and CKD;  who presented on 8/25/2021 12:40 AM as a transfer from [facility] emergency department for an intracranial hemorrhage.  Per documentation, patient presented to the [facility] emergency department with a sudden onset of slurred speech, right-sided facial droop and decreased sensation of the right upper and lower extremities that started approximately 1 hour prior to arrival to the emergency department.  At time of evaluation in the emergency department patient is found to have an NIH SS 6 and code stroke was called.  She is also noted to have a blood pressure of 250/130 and UDS positive for amphetamines and THC.  A CT of the head was obtained which revealed the patient had a left parietal intraparenchymal hemorrhage and was transferred to [facility] for higher level of care.  Per documentation patient was not on any blood thinners in the outpatient setting and did not have any preceding trauma.  In the emergency department at [facility] patient was able to protect her airway, patient was subsequently intubated and started on a nicardipine drip.  Neurosurgery was consulted, patient was started on seizure prophylaxis, EEG was ordered, and MRI brain is pending.  Neurology has been consulted.  Patient remains intubated and sedated, therapy eval's have not been completed at this time.    8/25:Full review of systems unable to be obtained, patient is intubated and sedated.  8/27: Patient seen and examined at bedside, nursing at side.  Patient just completed SLP eval, planning for FEES tomorrow. Patient reports continued RUE weakness, but denies HA, blurry vision, CP, SOB, or new numbness/tingling.. Patient with dysarthric speech, potentially exacerbating by sore throat from intubation.     Social Hx:  Patient lives in a 1 story house with 2 steps to enter, DC plan after hospital is to [facility], which is a 1 story home with 2 steps to enter, would have intermittment supervision from daughter who works during the day.   unknown DANILO  At prior level of function patient was independent with mobility and ADLs       Tobacco: Per records patient has a history of tobacco use  Alcohol: Unknown at this time  Drugs: Positive amphetamine and THC    THERAPY:  Restrictions: Currently on bedrest  PT: Functional mobility    PT Note: Min A sit to stand, HAA for 10ft at Min A level     OT: ADLs   OT: Min A sit to stand, Min A transfer, HHA in room x 10 feet.     SLP:   SLP udayal pending    IMAGIN/25 CT head  FINDINGS:     Multiloculated parenchymal hemorrhage is identified in the left temporal parietal area measuring 2.7 x 5.1 cm. Small amount of surrounding edema is noted. No new parenchymal hemorrhage is identified. No extra-axial hemorrhage is identified. There is   minimal central midline shift to the right side measuring approximately 2 mm. New areas of brain swelling or edema are identified.    PROCEDURES:  None    PMH:  Past Medical History:   Diagnosis Date   • COPD (chronic obstructive pulmonary disease) ([facility])    • Hypertension    • Kidney disease    • Substance abuse (HCC)        PSH:  No past surgical history on file.    FHX:  No family history on file.    Medications:  Current Facility-Administered Medications   Medication Dose   • potassium bicarbonate (KLYTE) effervescent tablet 25 mEq  25 mEq   • enalaprilat (VASOTEC) injection 1.25 mg  1.25 mg   • metoprolol tartrate (LOPRESSOR) tablet 12.5 mg  12.5 mg   • lactated ringers  "infusion     • dexmedetomidine (PRECEDEX) 400 mcg/100mL NS premix infusion  0.1-1.5 mcg/kg/hr   • ipratropium-albuterol (DUONEB) nebulizer solution  3 mL   • amLODIPine (NORVASC) tablet 10 mg  10 mg   • oxyCODONE immediate-release (ROXICODONE) tablet 5 mg  5 mg   • senna-docusate (PERICOLACE or SENOKOT S) 8.6-50 MG per tablet 2 Tablet  2 Tablet    And   • polyethylene glycol/lytes (MIRALAX) PACKET 1 Packet  1 Packet    And   • magnesium hydroxide (MILK OF MAGNESIA) suspension 30 mL  30 mL    And   • bisacodyl (DULCOLAX) suppository 10 mg  10 mg   • Pharmacy Consult: Enteral tube insertion - review meds/change route/product selection  1 Each   • Respiratory Therapy Consult     • LORazepam (ATIVAN) injection 2 mg  2 mg   • acetaminophen (Tylenol) tablet 650 mg  650 mg    Or   • acetaminophen (TYLENOL) suppository 650 mg  650 mg   • ondansetron (ZOFRAN ODT) dispertab 4 mg  4 mg    Or   • ondansetron (ZOFRAN) syringe/vial injection 4 mg  4 mg   • MD Alert...ICU Electrolyte Replacement per Pharmacy     • niCARdipine (CARDENE) 25 mg in  mL Infusion  0-15 mg/hr   • labetalol (NORMODYNE/TRANDATE) injection 10 mg  10 mg   • hydrALAZINE (APRESOLINE) injection 10 mg  10 mg   • promethazine (PHENERGAN) suppository 12.5-25 mg  12.5-25 mg   • prochlorperazine (COMPAZINE) injection 5-10 mg  5-10 mg   • promethazine (PHENERGAN) tablet 12.5-25 mg  12.5-25 mg       Allergies:  Not on File      Physical Exam:  Vitals: /78   Pulse 69   Temp 36.1 °C (97 °F) (Temporal)   Resp (!) 22   Ht 1.702 m (5' 7\")   Wt 88.2 kg (194 lb 7.1 oz)   SpO2 93%   Gen: NAD, lying in ICU bed, alert and awake   Head:  NC/AT  Eyes/ Nose/ Mouth: PERRLA, moist mucous membranes  Cardio: RRR, good distal perfusion, warm extremities  Pulm: ,  no cyanosis, no longer intubated, no witnessed wheezes or coughing.   Abd: Soft NTND, negative borborygmi   Ext: No peripheral edema. No calf tenderness. No clubbing.    Mental status: oriented to self, " place, year, and month,   Speech: fluent, no aphasia or  + dysarthria     CRANIAL NERVES:  2,3: visual acuity grossly intact, PERRL  3,4,6: EOMI bilaterally, no nystagmus or diplopia  5: sensation intact to light touch bilaterally and symmetric  7: no facial asymmetry      Motor:   RUE: 3/5  strength, 4/5 EF, EE, SAB   LUE : 5/5  strenth, EE,EF, SAB   B/l LLE 5/5 DF, PF, KE HF     Negative Pronator drift bilaterally     Sensory:   Intact sensation to LUE, decreased sensation R hand distally     DTRs:  No clonus at bilateral ankles  Negative babinski b/l  Negative Chatman b/l     Tone: no spasticity noted, no cogwheeling noted    Coordination: unable to assist due to sedation and agitation     Labs: Reviewed and significant for   Recent Labs     08/26/21  0600 08/27/21  0430   RBC 4.56 4.18*   HEMOGLOBIN 12.9 11.7*   HEMATOCRIT 38.6 35.4*   PLATELETCT 189 160*     Recent Labs     08/26/21  0600 08/26/21  1240 08/27/21  0430   SODIUM 140 138 139   POTASSIUM 2.9*  --  3.2*   CHLORIDE 106  --  103   CO2 23  --  24   GLUCOSE 108*  --  120*   BUN 15  --  20   CREATININE 0.94  --  0.76   CALCIUM 7.9*  --  8.3*     Recent Results (from the past 24 hour(s))   POCT glucose device results    Collection Time: 08/26/21 12:34 PM   Result Value Ref Range    Glucose - Accu-Ck 105 (H) 65 - 99 mg/dL   Sodium Serum (NA)    Collection Time: 08/26/21 12:40 PM   Result Value Ref Range    Sodium 138 135 - 145 mmol/L   CBC with Differential    Collection Time: 08/27/21  4:30 AM   Result Value Ref Range    WBC 8.5 4.8 - 10.8 K/uL    RBC 4.18 (L) 4.20 - 5.40 M/uL    Hemoglobin 11.7 (L) 12.0 - 16.0 g/dL    Hematocrit 35.4 (L) 37.0 - 47.0 %    MCV 84.7 81.4 - 97.8 fL    MCH 28.0 27.0 - 33.0 pg    MCHC 33.1 (L) 33.6 - 35.0 g/dL    RDW 46.7 35.9 - 50.0 fL    Platelet Count 160 (L) 164 - 446 K/uL    MPV 10.6 9.0 - 12.9 fL    Neutrophils-Polys 76.00 (H) 44.00 - 72.00 %    Lymphocytes 15.20 (L) 22.00 - 41.00 %    Monocytes 5.30 0.00 - 13.40  %    Eosinophils 2.70 0.00 - 6.90 %    Basophils 0.40 0.00 - 1.80 %    Immature Granulocytes 0.40 0.00 - 0.90 %    Nucleated RBC 0.00 /100 WBC    Neutrophils (Absolute) 6.43 2.00 - 7.15 K/uL    Lymphs (Absolute) 1.29 1.00 - 4.80 K/uL    Monos (Absolute) 0.45 0.00 - 0.85 K/uL    Eos (Absolute) 0.23 0.00 - 0.51 K/uL    Baso (Absolute) 0.03 0.00 - 0.12 K/uL    Immature Granulocytes (abs) 0.03 0.00 - 0.11 K/uL    NRBC (Absolute) 0.00 K/uL   Basic Metabolic Panel (BMP)    Collection Time: 08/27/21  4:30 AM   Result Value Ref Range    Sodium 139 135 - 145 mmol/L    Potassium 3.2 (L) 3.6 - 5.5 mmol/L    Chloride 103 96 - 112 mmol/L    Co2 24 20 - 33 mmol/L    Glucose 120 (H) 65 - 99 mg/dL    Bun 20 8 - 22 mg/dL    Creatinine 0.76 0.50 - 1.40 mg/dL    Calcium 8.3 (L) 8.5 - 10.5 mg/dL    Anion Gap 12.0 7.0 - 16.0   Phosphorus    Collection Time: 08/27/21  4:30 AM   Result Value Ref Range    Phosphorus 3.2 2.5 - 4.5 mg/dL   Magnesium    Collection Time: 08/27/21  4:30 AM   Result Value Ref Range    Magnesium 2.0 1.5 - 2.5 mg/dL   ESTIMATED GFR    Collection Time: 08/27/21  4:30 AM   Result Value Ref Range    GFR If African American >60 >60 mL/min/1.73 m 2    GFR If Non African American >60 >60 mL/min/1.73 m 2         ASSESSMENT:  Patient is a 51 y.o. female admitted with right-sided weakness secondary to left temporal parietal IPH    [facility] Code / Diagnosis to Support: 0002.1 - Brain Dysfunction: Non-Traumatic    Additional Recommendations:  Left temporal parietal intraparenchymal hemorrhage  -Greatest deficits are acute onset right-sided weakness and respiratory distress requiring intubation  -Patient with questionable seizure activity, patient remains intubated and sedated, neurology following  -previously on seizure prophylaxis with Keppra, which as since been stopped   - Functioning at a Min A level with OT and PT     Dysphagia  - SLP billie completed, planning for FEES on 8/28   - impaired swallow and dysarthric speech  secondary to IPH and intubation     Agitation , improving   - patient continues to be agitated with with propofol sedation, improved since extubation on 8/26  - given recent history of amphetamine use,   - weaned off  Sedation, not requiring prece dex as of 8/27     Disposition  - Patient suprisingly functioning at a Min A/supervision level 1 day post intubation and sedation   - Will need to function at Mod I level in order to return to daughter's house, recommend on going rehab in IRF setting with 3hrs of therapy 5 days per week  - prior to acceptance to IRF will need insurance auth ([employer] to sub for auth on 8/27) and confirmation of home set up  - PM&R to continue to follow     Medical Complexity:  Hypertension  CKD   Left temporal IPH   Agitation     DVT PPX: SCDs      Thank you for allowing us to participate in the care of this patient.     Patient was seen for 35 minutes on unit/floor of which > 50% of time was spent on counseling and coordination of care,  (including attending ICU rounds)  regarding the above, including prognosis, risk reduction, benefits of treatment, and options for next stage of care.    Mi Issa D.O.   Physical Medicine and Rehabilitation     Please note that this dictation was created using voice recognition software. I have made every reasonable attempt to correct obvious errors, but there may be errors of grammar and possibly content that I did not discover before finalizing the note.

## 2021-08-27 NOTE — ASSESSMENT & PLAN NOTE
She was intubated 8/25/2021 through 8/26/2021 due to airway protection in the setting of intracranial hemorrhage  Wean off oxygen as able

## 2021-08-27 NOTE — PROGRESS NOTES
Neurosurgery Progress Note    Subjective:  No acute events overnight.  extubated    Exam:  Awake, agitated  Pupils 3 mm midline conjugate gaze  Follows commands, no volitional movement right arm, + guarding with right arm when held over face  Wiggle bilateral toes to command    BP  Min: 108/64  Max: 210/98  Pulse  Av  Min: 61  Max: 102  Resp  Av.9  Min: 17  Max: 59  Monitored Temp 2  Av.9 °C (100.2 °F)  Min: 37.4 °C (99.3 °F)  Max: 38.4 °C (101.1 °F)  SpO2  Av.6 %  Min: 82 %  Max: 100 %    No data recorded    Recent Labs     21  0600 21  0430   WBC 13.3* 8.5   RBC 4.56 4.18*   HEMOGLOBIN 12.9 11.7*   HEMATOCRIT 38.6 35.4*   MCV 84.6 84.7   MCH 28.3 28.0   MCHC 33.4* 33.1*   RDW 46.4 46.7   PLATELETCT 189 160*   MPV 9.8 10.6     Recent Labs     21  0600 21  1240 21  0430   SODIUM 140 138 139   POTASSIUM 2.9*  --  3.2*   CHLORIDE 106  --  103   CO2 23  --  24   GLUCOSE 108*  --  120*   BUN 15  --  20   CREATININE 0.94  --  0.76   CALCIUM 7.9*  --  8.3*         Recent Labs     21  0219   REACTMIN 5.2   CLOTKINET 1.1   CLOTANGL 76.3   MAXCLOTS 60.6   [id number] 0.5   PRCINADP 36.8*   PRCINAA 97.5*       Intake/Output                       21 - 21 0659 21 - 2159      Total  Total                 Intake    I.V.  1527.7  288.6 1816.2  --  -- --    Magnesium Sulfate Volume 50 -- 50 -- -- --    Precedex Volume 18.6 134.4 152.9 -- -- --    Cardene Volume 481.7 74.2 555.8 -- -- --    Propofol Volume 85.1 -- 85.1 -- -- --    IV Volume (Fentanyl Gtt) 7 -- 7 -- -- --    Volume (mL) (NS infusion) 249.3 -- 249.3 -- -- --    Volume (mL) (potassium chloride in water (KCL) ivpb (ICU ONLY) 40 mEq) 100 -- 100 -- -- --    Volume (mL) (lactated ringers infusion) 536 08 206 -- -- --    Other  --  240 240  --  -- --    Medications (PO/Enteral Liquids) -- 240 240 -- -- --    NG/GT  340  987 850  90  -- 90    Intake  (mL) (Enteral Tube 08/25/21 Cortrak - Gastric Right nare) 340 540 880 90 -- 90    Enteral  --  -- --  60  -- 60    Free Water / Tube Flush -- -- -- 60 -- 60    Total Intake 1867.7 1068.6 2936.2 150 -- 150       Output    Urine  560  515 1075  75  -- 75    Output (mL) (Urethral Catheter)  75 -- 75    Emesis/NG output  0  -- 0  --  -- --    Output (mL) (Enteral Tube 08/25/21 Cortrak - Gastric Right nare) 0 -- 0 -- -- --    Total Output  75 -- 75       Net I/O     1307.7 553.6 1861.2 75 -- 75            Intake/Output Summary (Last 24 hours) at 8/27/2021 0835  Last data filed at 8/27/2021 0812  Gross per 24 hour   Intake 2441.88 ml   Output 970 ml   Net 1471.88 ml            • potassium bicarbonate  25 mEq Q4HRS   • LR   Continuous   • dexmedetomidine (PRECEDEX) infusion  0.1-1.5 mcg/kg/hr Continuous   • ipratropium-albuterol  3 mL 4X/DAY   • amLODIPine  10 mg Q DAY   • oxyCODONE immediate-release  5 mg Q4HRS PRN   • senna-docusate  2 Tablet BID    And   • polyethylene glycol/lytes  1 Packet QDAY PRN    And   • magnesium hydroxide  30 mL QDAY PRN    And   • bisacodyl  10 mg QDAY PRN   • Pharmacy  1 Each PHARMACY TO DOSE   • fentaNYL  100 mcg Q15 MIN PRN    And   • fentaNYL  200 mcg Q15 MIN PRN   • Respiratory Therapy Consult   Continuous RT   • LORazepam  2 mg Q5 MIN PRN   • acetaminophen  650 mg Q4HRS PRN    Or   • acetaminophen  650 mg Q4HRS PRN   • ondansetron  4 mg Q4HRS PRN    Or   • ondansetron  4 mg Q4HRS PRN   • MD Alert...Adult ICU Electrolyte Replacement per Pharmacy   PHARMACY TO DOSE   • niCARdipine infusion  0-15 mg/hr Continuous   • labetalol  10 mg Q4HRS PRN   • hydrALAZINE  10 mg Q4HRS PRN   • enalaprilat  1.25 mg Q6HRS PRN   • promethazine  12.5-25 mg Q4HRS PRN   • prochlorperazine  5-10 mg Q4HRS PRN   • promethazine  12.5-25 mg Q4HRS PRN       Assessment and Plan:  Hospital day #3 left frontal parietal ICH  POD #na  Prophylactic anticoagulation: no         Start date/time:  TBD    Plan:  No ASA or anticoagulants  Continue Keppra  Keep SBP less than 140  Follow up in my clinic with MRI Brain +/- contrast  in 6 weeks   Keep eunatremic  Neurosurgery will sign off.  Please call with any questions   Please address amphetamine use

## 2021-08-27 NOTE — CONSULTS
Hospital Medicine Consultation    Date of Service  8/27/2021    Referring Physician  Bebo Bella M.D.    Consulting Physician  Talat Rodriguez M.D.    Reason for Consultation  stroke    History of Presenting Illness  51 y.o. female who presented 8/25/2021 with intracranial hemorrhage.  Ms. Rangel has a past medical history of hypertension chronic kidney disease was brought to [facility] for concerns of possible stroke.  There, she was found to have slurred speech right facial droop and an NIH stroke scale of 6.  She also noticeably had a blood pressure of 250/130.  CT revealed a left parietal intraparenchymal hemorrhage therefore she was transferred here for higher level of care.  In the emergency room she required intubation for airway protection.  She was seen by Dr. Chanel, neurosurgery who recommended medical management.  An EEG was done which was normal with intermittent left frontotemporal slowing though no seizure activity.  She was extubated on 8/26/2021.    8/27/2021: Patient seen evaluated intensive care unit.  Her blood pressure is 120s to 140s and she has been started on Norvasc and metoprolol.  Her 2 daughters are at bedside and we discussed her condition at length.  Ms. Rangel very significant expressive aphasia and is effectively unable to speak coherently.  Blood pressures been quite elevated and she is required multiple as needed medications. She remains on cortrak feeding.  Review of Systems  Review of Systems   Unable to perform ROS: Medical condition       Past Medical History   has a past medical history of COPD (chronic obstructive pulmonary disease) ([facility]), Hypertension, Kidney disease, and Substance abuse ([facility]).    Surgical History  Back and neck surgery    Family History  Paternal grandparents had strokes    Social History   she lives in [place], she smokes and uses meth    Medications  None       Allergies  Not on File    Physical Exam  Temp:  [36.7 °C (98 °F)]  36.7 °C (98 °F)  Pulse:  [] 71  Resp:  [17-59] 20  BP: (108-210)/() 136/68  SpO2:  [90 %-100 %] 99 %    Physical Exam  Vitals and nursing note reviewed.   Constitutional:       General: She is not in acute distress.     Appearance: She is obese.   HENT:      Head: Normocephalic and atraumatic.      Mouth/Throat:      Mouth: Mucous membranes are dry.      Pharynx: Oropharynx is clear.      Comments: cortrak nares  Eyes:      General: No scleral icterus.     Conjunctiva/sclera: Conjunctivae normal.   Neck:      Comments: Right IJ central line  Cardiovascular:      Rate and Rhythm: Normal rate and regular rhythm.      Heart sounds: No murmur heard.     Pulmonary:      Effort: Pulmonary effort is normal. No respiratory distress.      Breath sounds: Normal breath sounds.   Abdominal:      General: There is no distension.   Musculoskeletal:      Cervical back: Normal range of motion and neck supple.      Right lower leg: No edema.      Left lower leg: No edema.   Skin:     General: Skin is warm and dry.   Neurological:      Mental Status: She is alert.      Comments: Speech is unintelligible  4+/5 strength right hand   Psychiatric:      Comments: Awake alert  She is compliant with exam         Fluids  Date 08/27/21 0700 - 08/28/21 0659   Shift 5861-4045 9692-4912 3716-2144 24 Hour Total   INTAKE   I.V. 16.5   16.5   NG/   360   Enteral 340   340   Shift Total 716.5   716.5   OUTPUT   Urine 385   385   Shift Total 385   385   Weight (kg) 88.2 88.2 88.2 88.2       Laboratory  Recent Labs     08/26/21  0600 08/27/21  0430   WBC 13.3* 8.5   RBC 4.56 4.18*   HEMOGLOBIN 12.9 11.7*   HEMATOCRIT 38.6 35.4*   MCV 84.6 84.7   MCH 28.3 28.0   MCHC 33.4* 33.1*   RDW 46.4 46.7   PLATELETCT 189 160*   MPV 9.8 10.6     Recent Labs     08/26/21  0600 08/26/21  1240 08/27/21  0430   SODIUM 140 138 139   POTASSIUM 2.9*  --  3.2*   CHLORIDE 106  --  103   CO2 23  --  24   GLUCOSE 108*  --  120*   BUN 15  --  20    CREATININE 0.94  --  0.76   CALCIUM 7.9*  --  8.3*              Recent Labs     08/25/21  0219   TRIGLYCERIDE 77   HDL 82   LDL 84        Imaging  DX-CHEST-PORTABLE (1 VIEW)   Final Result      1.  Lower lung volumes and increased atelectasis following extubation   2.  New patchy opacity in the LEFT mid lung could be atelectasis or early pneumonia      DX-CHEST-PORTABLE (1 VIEW)   Final Result      1.  Line and tube position as described above. No visible pneumothorax.   2.  LEFT basilar atelectasis or pneumonia      MR-BRAIN-WITH & W/O   Final Result      1.  Approximately 40 mm acute parenchymal hemorrhage at the left posterior frontal convexity. No abnormal vascular flow voids or underlying enhancing mass lesion.   2.  Encephalomalacic change in the left frontal deep white matter along the corona radiata and far posterior aspect of the left head of caudate nucleus consistent with old infarction.   3.  Moderate supratentorial white matter disease most consistent with microvascular ischemic change.   4.  Incidental 9 mm meningioma at the floor of the left middle cranial fossa.   5.  Old lacunar size infarct in the left paramedian hans.   6.  Mild-moderate pontine ischemic gliosis.      EC-ECHOCARDIOGRAM COMPLETE W/O CONT   Final Result      DX-ABDOMEN FOR TUBE PLACEMENT   Final Result      Feeding tube extends below the diaphragm with tip at the level of the proximal duodenum.      DX-CHEST-LIMITED (1 VIEW)   Final Result         1.  Line and tube placements are noted as described above.      2.  No pneumothorax.      3.  Mild perihilar congestion.      4.  No consolidations identified.      CT-HEAD W/O   Final Result         1.  No significant change in left-sided parenchymal hemorrhage since prior CT.      2.  Minimal midline shift to the right side.      3.  No new intracranial hemorrhage is identified.         OUTSIDE IMAGES-CT HEAD   Final Result          Assessment/Plan  * Nontraumatic cortical hemorrhage  of left cerebral hemisphere (HCC)- (present on admission)  Assessment & Plan  Hypertensive bleed in the setting of untreated hypertension and methampetamine use  Non-operative management with consultation by Dr. Chanel  She has right arm deficit and expressive aphasia with dysphagia.   cortrak feeding  Therapies      Hypertensive emergency- (present on admission)  Assessment & Plan  She was started on norvasc and metoprolol  Her BP has remained quite elevated thus change metoprolol to labetalol 200 mg BID  IV prns available    Tobacco abuse- (present on admission)  Assessment & Plan  Cessation discussions  Nicotine patch    Methamphetamine abuse (HCC)- (present on admission)  Assessment & Plan  tox screen is +      Chronic kidney disease- (present on admission)  Assessment & Plan  History of    Acute respiratory failure with hypoxia (HCC)- (present on admission)  Assessment & Plan  She was intubated 8/25/2021 through 8/26/2021 due to airway protection in the setting of intracranial hemorrhage       [place]

## 2021-08-27 NOTE — CARE PLAN
Problem: Bronchoconstriction  Goal: Improve in air movement and diminished wheezing  Description: 1.  Implement inhaled treatments  2.  Evaluate and manage medication effects  Outcome: Progressing    Pt on QID duoneb tx's, tolerating well.

## 2021-08-27 NOTE — CARE PLAN
Problem: Nutritional:  Goal: Nutrition support tolerated and meeting greater than 85% of estimated needs  Outcome: Met   Tube feed with Impact Peptide 1.5 at goal rate of 45 ml/hour.

## 2021-08-27 NOTE — PROGRESS NOTES
Pt. Complaining of headache and neck pain, already received tylenol, remains in pain, notified Dr. Albarado, orders for PRN Roxicodone received.  Will implement and continue to assess and intervene appropriately.

## 2021-08-27 NOTE — CARE PLAN
The patient is Watcher - Medium risk of patient condition declining or worsening    Shift Goals  Clinical Goals: Pain control, reassurance, BP control, monitor neuro status   Patient Goals: Pain control, rest  Family Goals: Rest, reassurance, pain control     Progress made toward(s) clinical / shift goals:  Pt with ride sided weakness, facial droop and significant aphasia. Pt walked inside of room today w/ x2 assist. Pt up to commode multiple times throughout the shift s/p morales removal. Multiple IV PRNs given for HTN. BP more responsive to calming measures than antihypertensives (pt intermittantly tearful and anxious r/t aphasia).       Problem: Fall Risk  Goal: Patient will remain free from falls  Outcome: Progressing     Problem: Hemodynamics  Goal: Patient's hemodynamics, fluid balance and neurologic status will be stable or improve  Outcome: Progressing     Problem: Respiratory  Goal: Patient will achieve/maintain optimum respiratory ventilation and gas exchange  Outcome: Progressing       Patient is not progressing towards the following goals:

## 2021-08-27 NOTE — THERAPY
Contact Note    Patient Name: Heide Rangel  Age:  51 y.o., Sex:  female  Medical Record #: [id number]  Today's Date: 8/27/2021    continues on strict bedrest; will continue to hold until bedrest order removed per protocol; please reach out to PT once bedrest removed for formal eval/initiate dc planning

## 2021-08-27 NOTE — CARE PLAN
The patient is Watcher - Medium risk of patient condition declining or worsening    Shift Goals  Clinical Goals: Pain control, reassurance, BP control, monitor neuro status   Patient Goals: Pain control, rest  Family Goals: Rest, reassurance, pain control     Progress made toward(s) clinical / shift goals:  Patient extubated today, on HFNC, providing oral and NT suctioning, monitoring neuro status, providing reassurance.     Patient is not progressing towards the following goals:      Problem: Knowledge Deficit - Standard  Goal: Patient and family/care givers will demonstrate understanding of plan of care, disease process/condition, diagnostic tests and medications  Outcome: Not Met     Problem: Fall Risk  Goal: Patient will remain free from falls  Outcome: Not Met     Problem: Skin Integrity  Goal: Skin integrity is maintained or improved  Outcome: Not Met     Problem: Psychosocial  Goal: Patient's level of anxiety will decrease  Outcome: Not Met  Goal: Patient's ability to verbalize feelings about condition will improve  Outcome: Not Met  Goal: Patient's ability to re-evaluate and adapt role responsibilities will improve  Outcome: Not Met  Goal: Patient and family will demonstrate ability to cope with life altering diagnosis and/or procedure  Outcome: Not Met  Goal: Spiritual and cultural needs incorporated into hospitalization  Outcome: Not Met     Problem: Hemodynamics  Goal: Patient's hemodynamics, fluid balance and neurologic status will be stable or improve  Outcome: Not Met     Problem: Respiratory  Goal: Patient will achieve/maintain optimum respiratory ventilation and gas exchange  Outcome: Not Met     Problem: Mechanical Ventilation  Goal: Safe management of artificial airway and ventilation  Outcome: Not Met  Goal: Successful weaning off mechanical ventilator, spontaneously maintains adequate gas exchange  Outcome: Not Met  Goal: Patient will be able to express needs and understand  communication  Outcome: Not Met     Problem: Risk for Aspiration  Goal: Patient's risk for aspiration will be absent or decrease  Outcome: Not Met     Problem: Urinary - Renal Perfusion  Goal: Ability to achieve and maintain adequate renal perfusion and functioning will improve  Outcome: Not Met     Problem: Venous Thromboembolism (VTE) Prevention  Goal: The patient will remain free from venous thromboembolism (VTE)  Outcome: Not Met     Problem: Nutrition  Goal: Patient's nutritional and fluid intake will be adequate or improve  Outcome: Not Met  Goal: Enteral nutrition will be maintained or improve  Outcome: Not Met  Goal: Enteral nutrition will be maintained or improve  Outcome: Not Met     Problem: Urinary Elimination  Goal: Establish and maintain regular urinary output  Outcome: Not Met     Problem: Bowel Elimination  Goal: Establish and maintain regular bowel function  Outcome: Not Met

## 2021-08-27 NOTE — ASSESSMENT & PLAN NOTE
Hypertensive bleed in the setting of untreated hypertension and methampetamine use  Non-operative management with consultation by Dr. Chanel  She has right arm deficit and expressive aphasia with dysphagia.   cortrak feeding  Therapies  Rehab medicine consulted,  Patient refused inpatient rehab  Ordered home health  Blood pressure control  Illicit drug counseling

## 2021-08-27 NOTE — PREADMISSION SCREENING NOTE
Pre-Admission Screening Form    Patient Information:   Name: Heide Rangel     MRN: [id number]       : 1970      Age: 51 y.o.   Gender: female      Race: Unable to Obtain [10]       Marital Status: Unknown [6]  Family Contact: Deya Olvera Gina        Relationship: Daughter [2]  Sister [14]  Home Phone:              Cell Phone: [phone]  Advanced Directives: Unknown  Code Status:  FULL  Current Attending Provider: Bebo Bella M.D.  Referring Physician: Dr. Gonda     Physiatrist Consult: Dr. Mi Issa       Referral Date: 2021  Primary Payor Source:  [facility] MEDICAID  Secondary Payor Source:      Medical Information:   Date of Admission to Acute Care [place]2021  Room Number: R100/00  Rehabilitation Diagnosis: 0002.1 - Brain Dysfunction: Non-Traumatic    There is no immunization history on file for this patient.  Not on File  Past Medical History:   Diagnosis Date   • COPD (chronic obstructive pulmonary disease) (HCC)    • Hypertension    • Kidney disease    • Substance abuse (HCC)      No past surgical history on file.    History Leading to Admission, Conditions that Caused the Need for Rehab (CMS):     Jeremy M Gonda, M.D.   Physician   Critical Care   Consults       Signed   Date of Service:  2021  1:01 AM                   Critical Care Consultation     Date of consult: 2021     Referring Physician  Dr. Louis Caruso     Reason for Consultation  ICH, resp failure     History of Presenting Illness  51 y.o. female who presented 2021 with a past medical history significant for hypertension and chronic kidney disease who was transferred from [facility] ER for intracranial hemorrhage. She presented to the ED there with sudden onset of slurred speech, right facial droop and decreased sensation in the right upper and lower extremities that started proximally an hour and a half before arrival. She was found to have an NIH  stroke scale of 6 and a code stroke was called. She was also noted to have a blood pressure of 250/130. On CT imaging, patient was found to have a left parietal intraparenchymal hemorrhage and was transferred to [facility] for higher level of care. She apparently does not take any blood thinners and did not have any preceding trauma. Upon arrival to the ED, patient was unable to protect her airway but per the ER physician, was moving the left side of her body, dysarthric and following intermittent commands but gurgling and foaming at the mouth. She was intubated and started on a nicardipine drip and I was consulted for critical care management. Neurosurgery has been consulted as well.     Code Status  Full Code     Review of Systems  Review of Systems   Unable to perform ROS: Intubated      Past Medical History   has no past medical history on file. -Hypertension, chronic kidney disease, seasonal allergies     Surgical History   has no past surgical history on file. -Unknown     Family History  family history is not on file. -Unknown     Social History   Per the transfer records, patient has a history of tobacco use as well as marijuana and no alcohol use.           ERNESTO CallowayRCINDA   Nurse Practitioner   Critical Care   Procedures       Attested   Date of Service:  8/25/2021  5:11 AM            Procedure Orders   Arterial Line Insertion [[id number]] ordered by JAMES Calloway.   Post-procedure Diagnoses   Acute respiratory failure with hypoxia (HCC) [J96.01]   Hypertensive emergency [I16.1]          Attestation signed by Jeremy M Gonda, M.D. at 8/25/2021  5:24 AM   I was present to supervise and participated directly throughout the entire procedure as documented above by Dr. Winsome Quiroz      Indication for procedure: HTN emergency  Complications: none  Patient tolerated procedure well.     Bedside US imaging guidance used for the procedure.           Arterial Line Insertion     Date/Time: 8/25/2021  "4:30 AM  Performed by: JAMES Calloway.  Authorized by: THOMAS Calloway   Consent: The procedure was performed in an emergent situation.  Risks and benefits: risks, benefits and alternatives were discussed  Time out: Immediately prior to procedure a \"time out\" was called to verify the correct patient, procedure, equipment, support staff and site/side marked as required.  Preparation: Patient was prepped and draped in the usual sterile fashion.  Indications: multiple ABGs, respiratory failure and hemodynamic monitoring  Location: right radial  Anesthesia: local infiltration   Anesthesia:  Local Anesthetic: lidocaine 1% without epinephrine   Sedation:  Patient sedated: yes  Sedation type: anxiolysis  Sedatives: propofol and fentanyl  Vitals: Vital signs were monitored during sedation.     Michael's test normal: yes  Needle gauge: 20  Seldinger technique: Seldinger technique used  Number of attempts: 3  Post-procedure: line sutured and dressing applied  Post-procedure CMS: normal  Patient tolerance: patient tolerated the procedure well with no immediate complications                 Puma Chanel M.D.   Physician   Surgery Neurosurgery   Consults       Signed   Date of Service:  8/25/2021  8:00 AM                 Neurosurgery Consultation     Date of consult: 8/25/2021     Referring Physician Dr. Caruso  Reason for referral:  Intraparenchymal hemorrrhage     History of Presenting Illness  51 y.o. female with hypertension and chronic kidney disease transferred from [facility] ER for intracranial hemorrhage. She presented to the ED there with sudden onset of slurred speech, right facial droop and decreased sensation in the right upper and lower extremities Her blood pressure was 250/130. CT , demontrated a left intraparenchymal hemorrhage.      She apparently does not take any blood thinners and did not have any preceding trauma. Upon arrival to the ED, patient was unable to " protect her airway but per the ER physician, was moving the left side of her body, dysarthric and following intermittent commands.  She was intubated for airway protection.     Code Status  Full Code      AP:  51 year old female with 2 cm superficial left frontal-parietal ICH with severe hypertension and urine tox screen positive for amphetamines.  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  Her neurologic deterioration is out of proportion to the size of the hematoma.  EEG was recommended at 1 am but has not yet been performed.    Recommend STAT EEG r/o seizure  MRI +/- contrast evaluate tumor  CTA  Wean sedation as tolerated  Keep SBP < 140   Keep eunatremic    Mi Issa D.O.   Physician   Physical Medicine & Rehab   Consults       Signed   Date of Service:  8/25/2021  9:29 AM                                                                       Physical Medicine and Rehabilitation Consultation                                                                                  Date of initial consultation: 8/25/2021  Requesting provider: Jeremy Gonda, MD   Consulting provider: Mi Issa D.O.  Reason for consultation: assess for acute inpatient rehab appropriateness  LOS: 0 Day(s)     Chief complaint: Acute onset of slurred speech and right-sided facial droop     HPI: The patient is a 51 y.o.  female with a past medical history of hypertension and CKD;  who presented on 8/25/2021 12:40 AM as a transfer from [facility] emergency department for an intracranial hemorrhage.  Per documentation, patient presented to the [facility] emergency department with a sudden onset of slurred speech, right-sided facial droop and decreased sensation of the right upper and lower extremities that started approximately 1 hour prior to arrival to the emergency department.  At time of evaluation in the emergency department patient is found to have an NIH SS 6 and  code stroke was called.  She is also noted to have a blood pressure of 250/130 and UDS positive for amphetamines and THC.  A CT of the head was obtained which revealed the patient had a left parietal intraparenchymal hemorrhage and was transferred to [facility] for higher level of care.  Per documentation patient was not on any blood thinners in the outpatient setting and did not have any preceding trauma.  In the emergency department at [facility] patient was able to protect her airway, patient was subsequently intubated and started on a nicardipine drip.  Neurosurgery was consulted, patient was started on seizure prophylaxis, EEG was ordered, and MRI brain is pending.  Neurology has been consulted.  Patient remains intubated and sedated, therapy eval's have not been completed at this time.     Full review of systems unable to be obtained, patient is intubated and sedated.     Social Hx:  Unable to to determine patient's living situation, patient is intubated and sedated, daughter is not at bedside   unknown DANILO  At prior level of function patient was independent with mobility and ADLs         Tobacco: Per records patient has a history of tobacco use  Alcohol: Unknown at this time  Drugs: Positive amphetamine and THC     THERAPY:  Restrictions: Currently on bedrest  PT: Functional mobility   PT eval pending     OT: ADLs  OT eval pending     SLP:   SLP eval pending     IMAGIN/25 CT head  FINDINGS:     Multiloculated parenchymal hemorrhage is identified in the left temporal parietal area measuring 2.7 x 5.1 cm. Small amount of surrounding edema is noted. No new parenchymal hemorrhage is identified. No extra-axial hemorrhage is identified. There is   minimal central midline shift to the right side measuring approximately 2 mm. New areas of brain swelling or edema are identified.      ASSESSMENT:  Patient is a 51 y.o. female admitted with right-sided weakness secondary to left temporal parietal IPH     C Code /  Diagnosis to Support: 0002.1 - Brain Dysfunction: Non-Traumatic     Additional Recommendations:  Left temporal parietal intraparenchymal hemorrhage  -Greatest deficits are acute onset right-sided weakness and respiratory distress requiring intubation  -Patient with questionable seizure activity, patient remains intubated and sedated, neurology following  -Patient is on seizure prophylaxis with Keppra  - PT/OT evals pending      Agitation   - patient continues to be agitated with with propofol sedation  - given recent history of amphetamine use,   - monitor for signs/symptoms of alcohol withdrawal, as current alcohol use unknown, consideration for precedex      Disposition  -Patient's current functional status status post left temporal parietal lobe is impaired at this time due to sedation and intubation.  -Therapy eval's to be completed when patient is more stable and able to participate, given patient's age and diagnosis significant potential for patient to be a good candidate for IRF therapy, however will need home set up and therapy evals to determine rehab appropriateness   - PM&R to continue to follow.      Medical Complexity:  Hypertension  CKD        DVT PPX: SCDs        Thank you for allowing us to participate in the care of this patient.      Patient was seen for 111 minutes on unit/floor of which > 50% of time was spent on counseling and coordination of care,  (including attending ICU rounds)  regarding the above, including prognosis, risk reduction, benefits of treatment, and options for next stage of care.     Mi Issa D.O.   Physical Medicine and Rehabilitation     Tej Mckeon M.D.   Physician   Neurology   Procedures       Signed   Date of Service:  8/25/2021 11:23 AM                           VIDEO ELECTROENCEPHALOGRAM REPORT        Referring provider: Dr. Bella.      DOS: 8/25/2021 (total recording of 28 minutes).      INDICATION:  Heide Rangel 51 y.o. female presenting with dysarthria,  facial droop.      CURRENT ANTIEPILEPTIC REGIMEN: Sedated with Propofol, which was paused for the study.      TECHNIQUE: 30 channel video electroencephalogram (EEG) was performed in accordance with the international 10-20 system. The study was reviewed in bipolar and referential montages. The recording examined the patient during wakeful and drowsy state(s).      DESCRIPTION OF THE RECORD:  During the wakefulness, the background showed a symmetrical 10 Hz alpha activity posteriorly with amplitude of 70 mV.  There was reactivity to eye closure/opening.  A normal anterior-posterior gradient was noted with faster beta frequencies seen anteriorly.  During drowsiness, theta/delta frequencies were seen.     ACTIVATION PROCEDURES:   Intermittent Photic stimulation was performed in a stepwise fashion from 1 to 30 Hz and elicited a normal response (photic driving), most noticeable in the posterior leads.        ICTAL AND/OR INTERICTAL FINDINGS:   No focal or generalized epileptiform activity noted. Intermittent left frontotemporal slowing noted.  No clinical events or seizures were reported or recorded during the study.      EKG: sampling of the EKG recording demonstrated sinus rhythm.      EVENTS:  None.      INTERPRETATION:  This is an abnormal video EEG recording in the awake and drowsy state(s).  Intermittent left frontotemporal slowing noted, suggestive of an underlying structural abnormality. No seizures captured during the study. Clinical and radiological correlation is recommended.     Note: This EEG does not rule out epilepsy.  If the clinical suspicion remains high for seizures, a prolonged recording to capture clinical or subclinical events may be helpful.           Tej Mckeon MD   Epilepsy and Neurodiagnostics.         JAMES Paulino.   Nurse Practitioner   Neurology   Consults       Attested   Date of Service:  8/26/2021  9:15 AM            Consult Orders   IP Consult to Neurology  [[id number]] ordered by Giovani Hogan Jr., M.D. at 08/25/21 0932   IP Consult to Neurology [[id number]] ordered by Jeremy M Gonda, M.D. at 08/25/21 0113          Attestation signed by Temo Munroe M.D. at 8/26/2021  1:56 PM   I have seen and examined the patient and I agree with the note written by the APRN. I agree with the assessment and course of action as outlined below.     52 y/o with meth use, COPD, HTN admitted with a left cortical ICH (ICH score=1) manifest as dysarthria and right f/a/l weakness and intubated for airway protection. MRI performed and no underlying lesion or evidence of vascular anomaly. Likely 2/2 to HTN in the setting of methamphetamine use. At present awake opens eyes, with a right sided UMN pattern of weakness.      Keep SBP <140/90  Hold antiplatelet and AC for now can start DVT PPX at 72 hours if pt remains clinically stable  Follow up in stroke bridge clinic     No further recommendations     Temo Munroe M.D., Diplomat of the American Board of Psychiatry and Neurology  [facility]  Acute Neurology Consultant             Neurology Initial Consult H&P  Neurohospitalist Service, [facility]     Referring Physician: Bebo Bella M.D.         Chief Complaint   Patient presents with   • Possible Stroke         HPI: Heide Rangel is a 51 y.o. female with history of COPD, HTN, kidney disease, and substance abuse who presented to [facility] ED on 8/25/21 with acute onset of slurred speech, right facial droop, and decreased sensation in the RUE/RLE. She is currently intubated and sedated, thus history comes from chart review. At [facility], her NIHSS was 6 prompting a code stroke to be called. Patient was noted to be hypertensive with /130. CT head wo contrast there revealed a left parietal intraparenchymal hemorrhage and she was transferred to [facility] for higher level of care. Reportedly she does not take any  blood thinners, and there was no preceding fall/trauma. In the ED, she was unable to protect her airway with foaming of the mouth, gurgling, dysarthria, and intermittently following commands with movement of the left side of her body. Patient was then intubated, nicardipine infusion was started, and Neurosurgery and Critical Care were consulted. UDS was positive for amphetamines. Dr. Chanel, Neurosurgery, recommended no surgical intervention.       She is currently lying in bed, intubated, sedated on Propofol 40mcg/kg/min and Fentanyl 100mcg/hr, opens her eyes to verbal stimuli, follows commands on with LUE and BLE, and RUE hemiplegia.          Assessment and Plan:     Heide Rangel is a 51 y.o. female with relevant history of COPD, HTN, kidney disease, and substance abuse who presented to [facility] ED on 8/25/21 with acute onset of slurred speech, right facial droop, and decreased sensation in the RUE/RLE. CT head wo contrast 8/25/21 revealed a left frontoparietal intraparenchymal hemorrhage (2.7 X 5.1 cm) and she was transferred to [facility] for higher level of care. ICH score of 1. Routine EEG was obtained given concern for seizure and found to be abnormal with left frontotemporal slowing correlating to the left IPH, but no seizures or epileptiform discharges were seen. MRI brain wo contrast redemonstrated the left posterior frontal IPH now ~4 cm, but no vascular flow void, underlying enhancing mass, or acute infarct noted. Additionally she has an incidental 9mm meningioma to the left middle cranial fossa, and chronic left basal ganglia infarct. 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       Plan:     - Neurosurgery evaluation and collaboration appreciated  - HOB at 30 degrees  - Hold all antiplatelets and anticoagulants  - No AED  indicated at this time  - BP <140/90; nicardipine 10mg/hg currently   - SCD's for DVT ppx  - Maintain pCO2 of 35-40  - Keep euvolemic, euthermic, and normoglycemic (140-180)  - Follow up outpatient at [facility]. Referral placed. Will need eventual antiplatelet therapy, but recommend waiting several months with repeat outpatient CT head to determine stability before starting.      No further recommendations or further studies from a neurological standpoint at this time. Please re-consult if you have further questions or there is a change in status.     The evaluation of the patient, and recommended management, was discussed with Dr. Munroe, Dr. Bella, Dr. Munoz, Maegan Stuart, APRN Neurosurgery, and Evelyn, bedside RN.      JAMES Paulino.   Nurse Practitioner, Neurohospitalist    Talat Rodriguez M.D.   Physician   [facility] Medicine   Consults       Signed   Date of Service:  8/27/2021  2:16 PM                 Hospital Medicine Consultation     Date of Service  8/27/2021     Referring Physician  Bebo Bella M.D.     Consulting Physician  Talat Rodriguez M.D.     Reason for Consultation  stroke     History of Presenting Illness  51 y.o. female who presented 8/25/2021 with intracranial hemorrhage.  Ms. Rangel has a past medical history of hypertension chronic kidney disease was brought to [facility] for concerns of possible stroke.  There, she was found to have slurred speech right facial droop and an NIH stroke scale of 6.  She also noticeably had a blood pressure of 250/130.  CT revealed a left parietal intraparenchymal hemorrhage therefore she was transferred here for higher level of care.  In the emergency room she required intubation for airway protection.  She was seen by Dr. Chanel, neurosurgery who recommended medical management.  An EEG was done which was normal with intermittent left frontotemporal slowing though no seizure activity.  She was extubated on  8/26/2021.     8/27/2021: Patient seen evaluated intensive care unit.  Her blood pressure is 120s to 140s and she has been started on Norvasc and metoprolol.  Her 2 daughters are at bedside and we discussed her condition at length.  Ms. Rangel very significant expressive aphasia and is effectively unable to speak coherently.  Blood pressures been quite elevated and she is required multiple as needed medications. She remains on cortrak feeding.  Review of Systems  Review of Systems   Unable to perform ROS: Medical condition             Assessment/Plan  * Nontraumatic cortical hemorrhage of left cerebral hemisphere (HCC)- (present on admission)  Assessment & Plan  Hypertensive bleed in the setting of untreated hypertension and methampetamine use  Non-operative management with consultation by Dr. Chanel  She has right arm deficit and expressive aphasia with dysphagia.   cortrak feeding  Therapies        Hypertensive emergency- (present on admission)  Assessment & Plan  She was started on norvasc and metoprolol  Her BP has remained quite elevated thus change metoprolol to labetalol 200 mg BID  IV prns available     Tobacco abuse- (present on admission)  Assessment & Plan  Cessation discussions  Nicotine patch     Methamphetamine abuse (HCC)- (present on admission)  Assessment & Plan  tox screen is +        Chronic kidney disease- (present on admission)  Assessment & Plan  History of     Acute respiratory failure with hypoxia (HCC)- (present on admission)  Assessment & Plan  She was intubated 8/25/2021 through 8/26/2021 due to airway protection in the setting of intracranial hemorrhage     Co-morbidities: See above  Potential Risk - Complications: Cognitive Impairment, Contractures, Deep Vein Thrombosis, Dysphagia, Incontinence, Malnutrition, Pain, Paralysis, Perceptual Impairment, Pneumonia, Pressure Ulcer, Seizures, Urinary Tract Infection and Infection  Level of Risk: High    Ongoing Medical Management Needed  "(Medical/Nursing Needs):   Patient Active Problem List    Diagnosis Date Noted   • Methamphetamine abuse ([facility]) 08/27/2021   • Tobacco abuse 08/27/2021   • Nontraumatic cortical hemorrhage of left cerebral hemisphere ([facility]) 08/25/2021   • Acute respiratory failure with hypoxia ([facility]) 08/25/2021   • Hypertensive emergency 08/25/2021   • Acute pulmonary edema ([facility]) 08/25/2021   • Chronic kidney disease 08/25/2021     Marilyn Whittaker R.N.   Registered Nurse      Progress Notes       Signed   Date of Service:  8/27/2021  6:10 AM                 Monitor Summary  NSR  CA 0.16  QRS 0.091  QT 0.423        Current Vital Signs:   Temperature: 36.4 °C (97.6 °F) Pulse: 83 Respiration: (!) 32 Blood Pressure: (!) 183/91  Weight: 88.2 kg (194 lb 7.1 oz) Height: 170.2 cm (5' 7\")  Pulse Oximetry: 96 % O2 (LPM): 3      Completed Laboratory Reports:  Recent Labs     08/25/21  0219 08/25/21  0447 08/25/21  0818 08/25/21  1254 08/25/21  1256 08/25/21  1802 08/26/21  0004 08/26/21  0006 08/26/21  0514 08/26/21  0600 08/26/21  1234 08/26/21  1240 08/27/21  0430   WBC  --   --   --   --   --   --   --   --   --  13.3*  --   --  8.5   HEMOGLOBIN  --   --   --   --   --   --   --   --   --  12.9  --   --  11.7*   HEMATOCRIT  --   --   --   --   --   --   --   --   --  38.6  --   --  35.4*   PLATELETCT  --   --   --   --   --   --   --   --   --  189  --   --  160*   SODIUM 138  --  138  --  139  --   --  138  --  140  --  138 139   POTASSIUM  --   --   --   --   --   --   --   --   --  2.9*  --   --  3.2*   BUN  --   --   --   --   --   --   --   --   --  15  --   --  20   CREATININE  --   --   --   --   --   --   --   --   --  0.94  --   --  0.76   GLUCOSE  --   --   --   --   --   --   --   --   --  108*  --   --  120*   POCGLUCOSE  --  137*  --  102*  --  98 84  --  97  --  105*  --   --      Additional Labs: Not Applicable    Prior Living Situation:   Housing / Facility: 1 Story House  Steps Into Home: 2  Steps In Home: 0  Lives with - " Patient's Self Care Capacity: Adult Children  Equipment Owned: Quad Cane    Prior Level of Function / Living Situation:   Physical Therapy: Prior Services: Home-Independent  Housing / Facility: [address]  Steps Into Home: 2  Steps In Home: 0  Bathroom Set up: Walk In Shower, Bathtub / Shower Combination, Grab Bars, Shower Chair  Equipment Owned: Jacinto Mcclain  Lives with - Patient's Self Care Capacity: Adult Children  Bed Mobility: Independent  Transfer Status: Independent  Ambulation: Independent  Distance Ambulation (Feet):  (community)  Assistive Devices Used: None  Stairs: Independent  Current Level of Function:   Gait Level Of Assist: Minimal Assist  Assistive Device: Other (Comments) ([facility])  Distance (Feet): 10  Deviation: Decreased Base Of Support, Bradykinetic, Shuffled Gait, Decreased Heel Strike, Decreased Toe Off  # of Stairs Climbed: 0  Weight Bearing Status: no restrictions  Comments: in chair pre/post  Sit to Stand: Minimal Assist  Bed, Chair, Wheelchair Transfer: Minimal Assist  Toilet Transfers: Moderate Assist (BSC)  Transfer Method: Stand Step  Sitting in Chair: functional  Sitting Edge of Bed: NT  Standing: at least 3 mins  Occupational Therapy:   Self Feeding: Independent  Grooming / Hygiene: Independent  Bathing: Independent  Dressing: Independent  Toileting: Independent  Medication Management: Independent  Laundry: Independent  Kitchen Mobility: Independent  Finances: Independent  Home Management: Independent  Shopping: Independent  Prior Level Of Mobility: Independent Without Device in Community, Independent Without Device in Home  Driving / Transportation: Driving Independent  Prior Services: Home-Independent  Housing / Facility: [address] [facility] [place] [facility]\""  Occupation (Pre-Hospital Vocational): Not Employed  Leisure Interests: Family  Current Level of Function:   Lower Body Dressing: Supervision (socks)  Toileting: Minimal Assist (for thoroughness)  Speech Language Pathology:      Rehabilitation  Prognosis/Potential: Good  Estimated Length of Stay: 14-21 days    Nursing:      Continent    Scope/Intensity of Services Recommended:  Physical Therapy: 1 hr / day  5 days / week. Therapeutic Interventions Required: Maximize Endurance, Mobility, Strength and Safety  Occupational Therapy: 1 hr / day 5 days / week. Therapeutic Interventions Required: Maximize Self Care, ADLs, IADLs and Energy Conservation  Speech & Language Pathology: 1 hr / day 5 days / week. Therapeutic Interventions Required: Maximize Cognition, Swallowing and Safety  Rehabilitation Nursin/7. Therapeutic Interventions Required: Monitor Pain, Skin, Vital Signs, Intake and Output, Labs, Safety, Aspiration Risk, Family Training and Cortrak care/nutritional feeding regimen; DVT prophylaxis; Bowel & bladder regimen; ADL's; Infection control.   Rehabilitation Physician: 3 - 5 days / week. Therapeutic Interventions Required: Medical Management  Respiratory Care: Consult. Therapeutic Interventions Required: Pulmonary Toileting, Aspiration Risk and Respiratory care per protocol  Dietician: Consult. Therapeutic Interventions Required: Cortrak nutritional regimen; Nurtirional evaluation with recommendations to promote optimal health and healing.     She requires 24-hour rehabilitation nursing to manage bowel and bladder function, skin care, nutrition and fluid intake, pulmonary hygiene, pain control, safety, medication management and patient/family goals. In addition, rehabilitation nursing will reiterate and reinforce therapy skills and equipment use, including ADLs, as well as provide education to the patient and family. Heide Rangel is willing to participate in and is able to tolerate the proposed plan of care.    Rehabilitation Goals and Plan (Expected frequency & duration of treatment in the IRF):   Return to the Community, Modified Independent Level of Care and Outpatient Support  Anticipated Date of Rehabilitation Admission:  08/30/2021  Patient/Family oriented IRF level of care/facility/plan: Yes  Patient/Family willing to participate in IRF care/facility/plan: Yes  Patient able to tolerate IRF level of care proposed: Yes  Patient has potential to benefit IRF level of care proposed: Yes  Comments: Not Applicable    Special Needs or Precautions - Medical Necessity:  Tube Feedings   Safety Concerns/Precautions:  Fall Risk / High Risk for Falls, Balance, Cognition and Bed / Chair Alarm  Pain Management     Diet:   DIET ORDERS (From admission to next 24h)     Start     Ordered    08/25/21 1121  Diet Tube Feeding  CONTINUOUS DIET        Comments: Once off propofol, change tube feed to Impact Peptide 1.5 goal rate of 45 ml/hour.   Question Answer Comment   Which Rate/Volume? 40 ml/hour while on propofol    Formula: Peptamen Intense VHP    Specify Type: CONTINUOUS        08/25/21 1121    08/25/21 0112  Diet NPO  ALL MEALS        Question:  Restrict to:  Answer:  Strict    08/25/21 0113                Anticipated Discharge Destination / Patient/Family Goal:  Destination: Home with Assistance Support System: Family   Anticipated home health services: OT, PT, SLP, Nursing, Social Work and Aide  Previously used  service/ provider: Not Applicable  Anticipated DME Needs: To be determined  Outpatient Services: To be determined  Alternative resources to address additional identified needs:     Pre-Screen Completed: 8/27/2021 4:05 PM Erlinda Davis R.N.

## 2021-08-27 NOTE — THERAPY
Occupational Therapy   Initial Evaluation     Patient Name: Heide Rangel  Age:  51 y.o., Sex:  female  Medical Record #: [id number]  Today's Date: 8/27/2021     Precautions  Precautions: Fall Risk, Nasogastric Tube  Comments: BP goal <140/90    Assessment  Patient is 51 y.o. female transferred from [facility] for left parietal ICH and hypertensive emergency.  Pt with PMHx of HTN and CKD. Pt presents to OT eval with RUE strength and coordination deficits, impaired balance and activity tolerance, and speech deficits. Pt very motivated to participate and eager to learn how to be more independent with ADLs. Pt currently requiring modA for BSC transfer, Jessica for toileting, spv for seated grooming/hygiene, and spv for LB dressing. Pt will benefit from continued acute OT tx to address RUE functional use/strength/coordination, ADLs, ADL transfers, and functional mobility. Recommend PMR consult.     Plan    Recommend Occupational Therapy 5 times per week until therapy goals are met for the following treatments:  Adaptive Equipment, Cognitive Skill Development, Manual Therapy Techniques, Neuro Re-Education / Balance, Self Care/Activities of Daily Living, Therapeutic Activities and Therapeutic Exercises.    DC Equipment Recommendations: Unable to determine at this time  Discharge Recommendations: Recommend post-acute placement for additional occupational therapy services prior to discharge home.     Subjective    Pt alert, pleasant, and cooperative. No c/o pain during session.      Objective       08/27/21 1036   Prior Living Situation   Prior Services Home-Independent;None   Housing / Facility 1 Story House   Steps Into Home 2   Steps In Home 0   Bathroom Set up Walk In Shower;Bathtub / Shower Combination;Shower Chair;Grab Bars   Equipment Owned Quad Cane   Lives with - Patient's Self Care Capacity Adult Children;Child Less than 18 Years of Age;Unrelated Adult   Comments Pt is planning to live with her daughter from now on. Home  "setup above reflects her daughters home. In daughters home she will be living also with son-in-law and granddaughters. Her daughter does work during the day so pt will need to be able to do basic self care tasks and mobility independently eventually.   Prior Level of ADL Function   Self Feeding Independent   Grooming / Hygiene Independent   Bathing Independent   Dressing Independent   Toileting Independent   Prior Level of IADL Function   Medication Management Independent   Laundry Independent   Kitchen Mobility Independent   Finances Independent   Home Management Independent   Shopping Independent   Prior Level Of Mobility Independent Without Device in Community;Independent Without Device in Home   Driving / Transportation Driving Independent   Occupation (Pre-Hospital Vocational) Not Employed   Leisure Interests Family   Precautions   Precautions Fall Risk;Nasogastric Tube   Comments BP goal <140/90   Vitals   O2 (LPM) 3   O2 Delivery Device Nasal Cannula   Pain 0 - 10 Group   Therapist Pain Assessment During Activity;Post Activity Pain Same as Prior to Activity;Nurse Notified;0   Non Verbal Descriptors   Non Verbal Scale  Calm   Cognition    Cognition / Consciousness X   Speech/ Communication Expressive Aphasia;Nods Appropriately   Level of Consciousness Alert   Comments very pleasant, cooperative, motivated to participate and complete tasks without assist, able to say 1-2 words and nods appropriately   Passive ROM Upper Body   Passive ROM Upper Body WDL   Active ROM Upper Body   Active ROM Upper Body  WDL   Dominant Hand Right   Strength Upper Body   Upper Body Strength  X   Comments RUE grossly 3-3+/5, LUE WFL   Sensation Upper Body   Upper Extremity Sensation  X   Comments reports some numbness in RUE as well as \"dull\" sensation in R hand   Upper Body Muscle Tone   Upper Body Muscle Tone  WDL   Neurological Concerns   Neurological Concerns Yes   Rt Upper Extremity Functional Use Impaired   Coordination Upper " Body   Coordination X   Fine Motor Coordination impaired   Gross Motor Coordination impaired   Comments delayed motor coordination, able to perform finger to thumb opposition does not quite hit center of finger and opposition is very effortful   Balance Assessment   Sitting Balance (Static) Fair +   Sitting Balance (Dynamic) Fair   Standing Balance (Static) Poor +   Standing Balance (Dynamic) Poor   Weight Shift Sitting Fair   Weight Shift Standing Poor   Comments seated with close spv, standing with HHA, no overt LOB   Bed Mobility    Comments in chair pre/post session   ADL Assessment   Grooming Supervision;Seated  (washing face)   Lower Body Dressing Supervision  (socks)   Toileting Minimal Assist  (for thoroughness)   Comments good initiation and problem solving   How much help from another person does the patient currently need...   Putting on and taking off regular lower body clothing? 3   Bathing (including washing, rinsing, and drying)? 3   Toileting, which includes using a toilet, bedpan, or urinal? 3   Putting on and taking off regular upper body clothing? 3   Taking care of personal grooming such as brushing teeth? 3   Eating meals? 3   6 Clicks Daily Activity Score 18   Modified [place] (mRS)   Modified Faith Score 4   Functional Mobility   Sit to Stand Minimal Assist   Bed, Chair, Wheelchair Transfer Minimal Assist   Toilet Transfers Moderate Assist  (BSC)   Transfer Method Stand Step   Mobility in room with HHA   Distance (Feet) 10   # of Times Distance was Traveled 1   ICU Target Mobility Level   ICU Mobility - Targeted Level Level 4   Visual Perception   Visual Perception  Not Tested   Comments appears WFL   Edema / Skin Assessment   Edema / Skin  Not Assessed   Activity Tolerance   Sitting in Chair in chair pre/post session   Sitting Edge of Bed NT   Standing 2-3 mins   Patient / Family Goals   Patient / Family Goal #1 To be independent   Short Term Goals   Short Term Goal # 1 Pt will complete  toilet transfer with spv by discharge.   Short Term Goal # 2 Pt will complete seated grooming/hygiene with spv by discharge.   Short Term Goal # 3 Pt will complete toileting with spv by discharge.   Short Term Goal # 4 Pt will increase RUE gross strength to WFL for improved independence with ADLs by discharge.   Education Group   Education Provided Role of Occupational Therapist   Role of Occupational Therapist Patient Response Patient;Family;Acceptance;Explanation;Verbal Demonstration   Problem List   Problem List Decreased Active Daily Living Skills;Decreased Homemaking Skills;Decreased Upper Extremity Strength Right;Decreased Functional Mobility;Decreased Activity Tolerance;Impaired Coordination Right Upper Extremity;Impaired Sensation Right Upper Extremity;Impaired Postural Control / Balance   Anticipated Discharge Equipment and Recommendations   DC Equipment Recommendations Unable to determine at this time   Discharge Recommendations Recommend post-acute placement for additional occupational therapy services prior to discharge home   Interdisciplinary Plan of Care Collaboration   IDT Collaboration with  Nursing   Patient Position at End of Therapy Seated;Call Light within Reach;Tray Table within Reach;Phone within Reach;Family / Friend in Room  (seated on BSC, family and nsg present)   Collaboration Comments Nsg aware of OT eval and outcomes

## 2021-08-28 PROBLEM — E87.6 HYPOKALEMIA: Status: ACTIVE | Noted: 2021-08-28

## 2021-08-28 PROBLEM — R13.10 DYSPHAGIA: Status: ACTIVE | Noted: 2021-08-28

## 2021-08-28 LAB
ANION GAP SERPL CALC-SCNC: 10 MMOL/L (ref 7–16)
BUN SERPL-MCNC: 18 MG/DL (ref 8–22)
CALCIUM SERPL-MCNC: 9 MG/DL (ref 8.5–10.5)
CHLORIDE SERPL-SCNC: 101 MMOL/L (ref 96–112)
CO2 SERPL-SCNC: 27 MMOL/L (ref 20–33)
CREAT SERPL-MCNC: 0.68 MG/DL (ref 0.5–1.4)
GLUCOSE SERPL-MCNC: 123 MG/DL (ref 65–99)
POTASSIUM SERPL-SCNC: 3.5 MMOL/L (ref 3.6–5.5)
SODIUM SERPL-SCNC: 138 MMOL/L (ref 135–145)

## 2021-08-28 PROCEDURE — 700102 HCHG RX REV CODE 250 W/ 637 OVERRIDE(OP): Performed by: INTERNAL MEDICINE

## 2021-08-28 PROCEDURE — A9270 NON-COVERED ITEM OR SERVICE: HCPCS | Performed by: INTERNAL MEDICINE

## 2021-08-28 PROCEDURE — 92612 ENDOSCOPY SWALLOW (FEES) VID: CPT

## 2021-08-28 PROCEDURE — 99232 SBSQ HOSP IP/OBS MODERATE 35: CPT | Performed by: INTERNAL MEDICINE

## 2021-08-28 PROCEDURE — A9270 NON-COVERED ITEM OR SERVICE: HCPCS | Performed by: GENERAL PRACTICE

## 2021-08-28 PROCEDURE — A9270 NON-COVERED ITEM OR SERVICE: HCPCS | Performed by: HOSPITALIST

## 2021-08-28 PROCEDURE — 770020 HCHG ROOM/CARE - TELE (206)

## 2021-08-28 PROCEDURE — 700102 HCHG RX REV CODE 250 W/ 637 OVERRIDE(OP): Performed by: GENERAL PRACTICE

## 2021-08-28 PROCEDURE — 94640 AIRWAY INHALATION TREATMENT: CPT

## 2021-08-28 PROCEDURE — 700111 HCHG RX REV CODE 636 W/ 250 OVERRIDE (IP): Performed by: INTERNAL MEDICINE

## 2021-08-28 PROCEDURE — 80048 BASIC METABOLIC PNL TOTAL CA: CPT

## 2021-08-28 PROCEDURE — 36415 COLL VENOUS BLD VENIPUNCTURE: CPT

## 2021-08-28 PROCEDURE — 94760 N-INVAS EAR/PLS OXIMETRY 1: CPT

## 2021-08-28 PROCEDURE — 700102 HCHG RX REV CODE 250 W/ 637 OVERRIDE(OP): Performed by: HOSPITALIST

## 2021-08-28 PROCEDURE — 700101 HCHG RX REV CODE 250: Performed by: INTERNAL MEDICINE

## 2021-08-28 RX ORDER — ACETAMINOPHEN 325 MG/1
650 TABLET ORAL EVERY 4 HOURS PRN
Status: DISCONTINUED | OUTPATIENT
Start: 2021-08-28 | End: 2021-08-30 | Stop reason: HOSPADM

## 2021-08-28 RX ORDER — ACETAMINOPHEN 650 MG/1
650 SUPPOSITORY RECTAL EVERY 4 HOURS PRN
Status: DISCONTINUED | OUTPATIENT
Start: 2021-08-28 | End: 2021-08-30 | Stop reason: HOSPADM

## 2021-08-28 RX ORDER — LISINOPRIL 5 MG/1
5 TABLET ORAL
Status: DISCONTINUED | OUTPATIENT
Start: 2021-08-29 | End: 2021-08-29

## 2021-08-28 RX ORDER — OXYCODONE HYDROCHLORIDE 5 MG/1
5 TABLET ORAL EVERY 4 HOURS PRN
Status: DISCONTINUED | OUTPATIENT
Start: 2021-08-28 | End: 2021-08-30 | Stop reason: HOSPADM

## 2021-08-28 RX ORDER — ONDANSETRON 2 MG/ML
4 INJECTION INTRAMUSCULAR; INTRAVENOUS EVERY 4 HOURS PRN
Status: DISCONTINUED | OUTPATIENT
Start: 2021-08-28 | End: 2021-08-30 | Stop reason: HOSPADM

## 2021-08-28 RX ORDER — ACETAMINOPHEN 325 MG/1
650 TABLET ORAL EVERY 4 HOURS PRN
Status: DISCONTINUED | OUTPATIENT
Start: 2021-08-28 | End: 2021-08-28

## 2021-08-28 RX ORDER — CARVEDILOL 6.25 MG/1
6.25 TABLET ORAL 2 TIMES DAILY WITH MEALS
Status: DISCONTINUED | OUTPATIENT
Start: 2021-08-28 | End: 2021-08-28

## 2021-08-28 RX ORDER — CLONIDINE HYDROCHLORIDE 0.1 MG/1
0.1 TABLET ORAL EVERY 12 HOURS PRN
Status: DISCONTINUED | OUTPATIENT
Start: 2021-08-28 | End: 2021-08-30

## 2021-08-28 RX ORDER — ONDANSETRON 4 MG/1
4 TABLET, ORALLY DISINTEGRATING ORAL EVERY 4 HOURS PRN
Status: DISCONTINUED | OUTPATIENT
Start: 2021-08-28 | End: 2021-08-30 | Stop reason: HOSPADM

## 2021-08-28 RX ORDER — POTASSIUM CHLORIDE 20 MEQ/1
40 TABLET, EXTENDED RELEASE ORAL ONCE
Status: DISCONTINUED | OUTPATIENT
Start: 2021-08-28 | End: 2021-08-28

## 2021-08-28 RX ORDER — ACETAMINOPHEN 650 MG/1
650 SUPPOSITORY RECTAL EVERY 4 HOURS PRN
Status: DISCONTINUED | OUTPATIENT
Start: 2021-08-28 | End: 2021-08-28

## 2021-08-28 RX ORDER — POTASSIUM CHLORIDE 20 MEQ/1
40 TABLET, EXTENDED RELEASE ORAL ONCE
Status: COMPLETED | OUTPATIENT
Start: 2021-08-28 | End: 2021-08-28

## 2021-08-28 RX ORDER — AMLODIPINE BESYLATE 5 MG/1
10 TABLET ORAL
Status: DISCONTINUED | OUTPATIENT
Start: 2021-08-29 | End: 2021-08-30 | Stop reason: HOSPADM

## 2021-08-28 RX ORDER — LISINOPRIL 5 MG/1
2.5 TABLET ORAL
Status: DISCONTINUED | OUTPATIENT
Start: 2021-08-28 | End: 2021-08-28

## 2021-08-28 RX ORDER — TRAMADOL HYDROCHLORIDE 50 MG/1
50 TABLET ORAL EVERY 6 HOURS PRN
Status: DISCONTINUED | OUTPATIENT
Start: 2021-08-28 | End: 2021-08-30 | Stop reason: HOSPADM

## 2021-08-28 RX ORDER — LABETALOL 200 MG/1
200 TABLET, FILM COATED ORAL TWICE DAILY
Status: DISCONTINUED | OUTPATIENT
Start: 2021-08-28 | End: 2021-08-30 | Stop reason: HOSPADM

## 2021-08-28 RX ORDER — PROMETHAZINE HYDROCHLORIDE 25 MG/1
12.5-25 TABLET ORAL EVERY 4 HOURS PRN
Status: DISCONTINUED | OUTPATIENT
Start: 2021-08-28 | End: 2021-08-30 | Stop reason: HOSPADM

## 2021-08-28 RX ORDER — LABETALOL 200 MG/1
200 TABLET, FILM COATED ORAL TWICE DAILY
Status: DISCONTINUED | OUTPATIENT
Start: 2021-08-28 | End: 2021-08-28

## 2021-08-28 RX ORDER — LISINOPRIL 5 MG/1
2.5 TABLET ORAL ONCE
Status: COMPLETED | OUTPATIENT
Start: 2021-08-28 | End: 2021-08-28

## 2021-08-28 RX ADMIN — ENALAPRILAT 1.25 MG: 1.25 INJECTION INTRAVENOUS at 15:33

## 2021-08-28 RX ADMIN — LABETALOL HYDROCHLORIDE 200 MG: 200 TABLET, FILM COATED ORAL at 05:03

## 2021-08-28 RX ADMIN — HYDRALAZINE HYDROCHLORIDE 10 MG: 20 INJECTION INTRAMUSCULAR; INTRAVENOUS at 17:11

## 2021-08-28 RX ADMIN — ACETAMINOPHEN 650 MG: 325 TABLET, FILM COATED ORAL at 18:21

## 2021-08-28 RX ADMIN — DOCUSATE SODIUM 50 MG AND SENNOSIDES 8.6 MG 2 TABLET: 8.6; 5 TABLET, FILM COATED ORAL at 05:03

## 2021-08-28 RX ADMIN — IPRATROPIUM BROMIDE AND ALBUTEROL SULFATE 3 ML: .5; 2.5 SOLUTION RESPIRATORY (INHALATION) at 10:09

## 2021-08-28 RX ADMIN — LISINOPRIL 2.5 MG: 5 TABLET ORAL at 12:59

## 2021-08-28 RX ADMIN — OXYCODONE 5 MG: 5 TABLET ORAL at 18:21

## 2021-08-28 RX ADMIN — LISINOPRIL 2.5 MG: 5 TABLET ORAL at 14:37

## 2021-08-28 RX ADMIN — HYDRALAZINE HYDROCHLORIDE 10 MG: 20 INJECTION INTRAMUSCULAR; INTRAVENOUS at 00:14

## 2021-08-28 RX ADMIN — IPRATROPIUM BROMIDE AND ALBUTEROL SULFATE 3 ML: .5; 2.5 SOLUTION RESPIRATORY (INHALATION) at 07:09

## 2021-08-28 RX ADMIN — HYDRALAZINE HYDROCHLORIDE 10 MG: 20 INJECTION INTRAMUSCULAR; INTRAVENOUS at 12:59

## 2021-08-28 RX ADMIN — LABETALOL HYDROCHLORIDE 10 MG: 5 INJECTION INTRAVENOUS at 03:39

## 2021-08-28 RX ADMIN — NICOTINE TRANSDERMAL SYSTEM 21 MG: 21 PATCH, EXTENDED RELEASE TRANSDERMAL at 05:04

## 2021-08-28 RX ADMIN — LABETALOL HYDROCHLORIDE 200 MG: 200 TABLET, FILM COATED ORAL at 17:12

## 2021-08-28 RX ADMIN — POTASSIUM CHLORIDE 40 MEQ: 1500 TABLET, EXTENDED RELEASE ORAL at 09:56

## 2021-08-28 RX ADMIN — AMLODIPINE BESYLATE 10 MG: 10 TABLET ORAL at 05:03

## 2021-08-28 RX ADMIN — OXYCODONE 5 MG: 5 TABLET ORAL at 03:07

## 2021-08-28 RX ADMIN — OXYCODONE 5 MG: 5 TABLET ORAL at 09:56

## 2021-08-28 RX ADMIN — CLONIDINE HYDROCHLORIDE 0.1 MG: 0.1 TABLET ORAL at 14:08

## 2021-08-28 RX ADMIN — LABETALOL HYDROCHLORIDE 10 MG: 5 INJECTION INTRAVENOUS at 11:16

## 2021-08-28 RX ADMIN — POLYETHYLENE GLYCOL 3350 1 PACKET: 17 POWDER, FOR SOLUTION ORAL at 17:13

## 2021-08-28 RX ADMIN — DOCUSATE SODIUM 50 MG AND SENNOSIDES 8.6 MG 2 TABLET: 8.6; 5 TABLET, FILM COATED ORAL at 17:12

## 2021-08-28 ASSESSMENT — PAIN DESCRIPTION - PAIN TYPE
TYPE: ACUTE PAIN

## 2021-08-28 NOTE — CARE PLAN
The patient is Watcher - Medium risk of patient condition declining or worsening    Shift Goals  Clinical Goals: upgrade diet  Patient Goals: rest  Family Goals: FAN    Progress made toward(s) clinical / shift goals:    Problem: Nutrition  Goal: Patient's nutritional and fluid intake will be adequate or improve  Outcome: Progressing  Note: Patient was upgraded from continuous tube feedings to a modified diet per SLP.       Patient is not progressing towards the following goals:      Problem: Hemodynamics  Goal: Patient's hemodynamics, fluid balance and neurologic status will be stable or improve  Outcome: Not Progressing  Note: Patient's blood pressure has been elevated above parameters throughout the shift. MD aware. Medications adjusted on MAR per MD. PRN BP meds given per MAR. Reassessments in place.

## 2021-08-28 NOTE — CARE PLAN
The patient is Stable - Low risk of patient condition declining or worsening    Shift Goals  Clinical Goals: Pain control, reassurance, BP control, monitor neuro status   Patient Goals: Pain control, rest  Family Goals: Rest, reassurance, pain control     Progress made toward(s) clinical / shift goals:  Provided quiet and calm environment with decreased stimulus for comfort. Pharmacological intervention for headache pain and to maintain blood pressure within parameters. Q4 hr neuro checks, neuro status maintained.     Patient is not progressing towards the following goals:      Problem: Bowel Elimination  Goal: Establish and maintain regular bowel function  Outcome: Not Progressing  Note: Last bowel movement PTA.

## 2021-08-28 NOTE — DISCHARGE PLANNING
[facility] Transitional Care Coordination    Insurance auth for inpatient rehab pending this morning.  Do not now anticipate determination until Monday at the earliest.  TCC following.

## 2021-08-28 NOTE — THERAPY
Speech Language Pathology   Clinical Swallow Evaluation     Patient Name: Heide Rangel  AGE:  51 y.o., SEX:  female  Medical Record #: [id number]  Today's Date: 8/27/2021     Precautions: Fall Risk, Swallow Precautions ( See Comments), Nasogastric Tube  Comments: BP goal <140/90 2' to recent [facility]    Assessment    Patient is 51 y.o. female admitted 8/25/21 for ICH and respiratory failure. Past medical history significant for hypertension, chronic kidney disease, and drug use.     Intubated 8/25 - 8/26.     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     CXR: Lower lung volumes and increased atelectasis following extubation. New patchy opacity in the LEFT mid lung could be atelectasis or early pneumonia.        Patient was seen on this date for a clinical swallow evaluation. Patient sitting out of bed in a chair. Daughter at bedside for session. Speech was dysarthric and aphasic (expressive>receptive) with islands of intact words and short phrases. Oral motor examination revealed right facial droop and poor dental quality.    PO trials were administered and consisted of ice chips, MTL, thin liquids, applesauce, and pudding. Patient had mild-moderate delay in onset of pharyngeal swallow. Patient presented with s/sx concerning for aspiration ~25-50% of the across all trials tested as seen by: increase in upper airway congestion and immediate and delayed coughing response. Patient with right sided pocketing of trials of pudding but able to clear with cues for lingual sweep and second swallow. Patient demonstrated good carryover implementing strategy for remainder of session. Significant bolus loss to right occurred following trials of thin liquids concerning for adequate bolus control. However, spillage was eliminated with cues for small sip and labial closure. Mild residue of applesauce on right lower lip that patient was not sensate to.     Education  provided to pt and daughter regarding current status, risk of aspiration, and SLP recs. Pt and daughter agreeable to FEES.     Plan/Recommendations    -Recommend continue NPO/cortrak.     -OK for a few single ice chips an hour with RN only.    -A FEES is indicated to further assess oropharyngeal function and determine safest means for nutrition.     Recommend Speech Therapy 5 times per week until therapy goals are met for the following treatments:  Dysphagia Training, Expression Training, Cognitive-Linguistic Training and Patient / Family / Caregiver Education.    Discharge Recommendations: Recommend post-acute placement for additional speech therapy services prior to discharge home     Objective       08/27/21 1533   Vitals   O2 (LPM) 3   O2 Delivery Device Silicone Nasal Cannula   Prior Level Of Function   Communication Within Functional Limits   Swallow Within Functional Limits   Dentition Poor Quality    Dentures None  (owns but does not wear them)   Hearing Within Functional Limits for Evaluation   Hearing Aid None   Vision Within Functional Limits for Evaluation   Patient's Primary Language English   Oral Motor Eval    Is Patient Able to Complete Oral Motor Eval Yes but Impaired   Labial Function   Labial Structure At Rest Moderate   Labial Vowel Production / I /, / U / Moderate   Labial Sequence / I /, / U / Moderate   Lingual Function   Lingual Structure At Rest Within Functional Limits   Lingual Protrude Within Functional Limits   Lingual Retract Within Functional Limits   Elevate Outside Mouth Within Functional Limits   Lateralization No Impairment Right;No Impairment Left   Jaw   Jaw Structure At Rest Within Functional Limits   Bite ([place]) Not Tested   Chew ([place]) Not Tested   Jaw Open / Resist Minimal   Jaw Close / Resist Minimal   Velar Function   Velar Structure At Rest Within Functional Limits   / A / Prolonged Within Functional Limits   Laryngeal Function   Voice Quality Within Functional  Limits   Volutional Cough Minimal   Excursion Upon Swallow Weak    Oral Food Presentation   Ice Chips Within Functional Limits   Single Swallow Mildly Thick (2) - (Nectar Thick)  Minimal   Single Swallow Thin (0) Minimal   Liquidised (3) Minimal   Pureed (4) Minimal   Self Feeding Needs Assistance   Dysphagia Strategies / Recommendations   Compensatory Strategies To Be Assessed   Diet / Liquid Recommendation NPO;Pre-Feeding Trials with SLP Only   Medication Administration  Via Gastric Tube   Therapy Interventions Dysphagia Therapy By Speech Language Pathologist;Pharyngeal / Laryngeal Exercises   Short Term Goals   Short Term Goal # 1 Patient will consume prefeeding trials with SLP with no overt s/sx of aspiration.

## 2021-08-28 NOTE — PROGRESS NOTES
4 Eyes Skin Assessment Completed by MASSIMO Rosales and MASSIMO Mead.    Head WDL  Ears WDL  Nose WDL  Mouth WDL  Neck dressing to right neck from IJ  Breast/Chest WDL  Shoulder Blades WDL  Spine WDL  (R) Arm/Elbow/Hand WDL  (L) Arm/Elbow/Hand WDL  Abdomen WDL  Groin WDL  Scrotum/Coccyx/Buttocks WDL  (R) Leg Bruising  (L) Leg Bruising  (R) Heel/Foot/Toe WDL  (L) Heel/Foot/Toe WDL          Devices In Places Tele Box, Blood Pressure Cuff, Pulse Ox, SCD's, Cortrak and Nasal Cannula      Interventions In Place Gray Ear Foams and Pillows    Possible Skin Injury No    Pictures Uploaded Into Epic N/A  Wound Consult Placed N/A  RN Wound Prevention Protocol Ordered No

## 2021-08-28 NOTE — PROGRESS NOTES
Monitor summary: SR 79-84, AK .16 , QRS .06, QT .39 with rare PVCs per strip from monitor room.

## 2021-08-28 NOTE — THERAPY
Speech Language Pathology   Fiberoptic Endoscopic Evaluation of Swallow     Patient Name: Heide Rangel  AGE:  51 y.o., SEX:  female  Medical Record #: [id number]  Today's Date: 8/28/2021     Precautions  Precautions: (P) Fall Risk, Swallow Precautions ( See Comments)  Comments: (P) BP goal <140/90 2' to recent [facility]    Assessment    Patient is 51 y.o. female with a PMHx of HTN, CKD.  She was transfered from [facility] with dysarthria, right facial droop, decreased sensation in the right upper and lower extremities, and elevated BP (250/130).  Patient  intubated for airway protection on 8/25 and extubated 8/26.     MRI of the Brain:   1.  Approximately 40 mm acute parenchymal hemorrhage at the left posterior frontal convexity. No abnormal vascular flow voids or underlying enhancing mass lesion.  2.  Encephalomalacic change in the left frontal deep white matter along the corona radiata and far posterior aspect of the left head of caudate nucleus consistent with old infarction.  3.  Moderate supratentorial white matter disease most consistent with microvascular ischemic change.  4.  Incidental 9 mm meningioma at the floor of the left middle cranial fossa.  5.  Old lacunar size infarct in the left paramedian hans.  6.  Mild-moderate pontine ischemic gliosis.    CXR:   1.  Lower lung volumes and increased atelectasis following extubation  2.  New patchy opacity in the LEFT mid lung could be atelectasis or early pneumonia    Discussed with the patient and her daughters at bedside the risks, benefits, and alternatives of the FEES procedure. Patient acknowledges and agreeable to proceed with the procedure and tolerated well.  The scope was advanced through the right nare.  Laryngeal assessment revealed large lingual tonsils.  Complete vocal fold adduction was achieved during breath hold and cough.      The patient presented with suspected mild-moderate oral dysphagia characterized by anterior bolus loss of soft solids, piecemeal  deglutition as seen by poorly masticated fruit in the valleculae, and impaired bolus control resulting in premature spillage to the valleculae and pyriform sinuses.      Mild pharyngeal dysphagia characterized by reduced sensation and reduced tongue base retraction.  Penetration to the glottic side of the epiglottis was visualized before the swallow with thins and mildly thick liquids with residue noted at the anterior commissure with thins but no residue visualized in the laryngeal vestibule with thickened liquids.  The patient had high penetration of pudding residue after the swallow.  No aspiration was visualized with any consistency consumed but unable to r/o aspiration of thin liquids.    OVERALL IMPRESSION:   The patient presents with mild-moderate oral and mild pharyngeal dysphagia in the setting of new intracranial hemorrhage marked by reduced oral bolus control, reduced tongue base retraction and impaired pharyngeal sensation.  Swallow safety and efficiency is mildly impaired. Swallow prognosis is good with ongoing medical management and exercise based swallow rehabilitation.    Recommendations:   1. Minced and Moist Diet with Mildly Thick Liquids   - Direct supervision and feeding assistance   - Check the right buccal cavity for pocketing   - Alternate solids and liquids.    2. Oral care after meals  3. SLP following for dysphagia management.  Will complete the cognitive-linguistic evaluation in the coming days.    Plan    Recommend Speech Therapy 5 times per week until therapy goals are met for the following treatments:  Dysphagia Training and Patient / Family / Caregiver Education.    Discharge Recommendations: (P) Recommend post-acute placement for additional speech therapy services prior to discharge home    Subjective    Minimal speech produced.  Moderate dysarthria noted.  Primarily answering with one word responses     Objective       08/28/21 0940   FEES Velopharyngeal Assessment    Velopharyngeal  Closure: Adequate   FEES Voice Assessment    Voice:   (reduced intensity)   FEES Sensation Assessment    Presence of Scope Adequate   Presence of Residue   (Inconsistent)   Presence of Penetration Absent Response   Presence of Aspiration No Aspiration   FEES Swallow Function Assessment   Swallow Trigger Impaired;Level of the Valleculae;Level of the Pyriform Sinuses   Base of Tongue Retraction Impaired   Pharyngeal Constrictor Movement Functional   White Out Phase Complete White Out   Epiglottic Movement   (Visualized to have sluggish retroflexion)   Cricopharyngeal Function Functional   Swallow Observations / Symptoms   Swallow Observations / Symptoms Yes   Vallecular Residue Pureed (4);Soft & Bite-Sized (6) - (Dysphagia III);Liquidised (3)   Penetration Before the Swallow Mildly Thick (2) - (Nectar Thick);Thin (0)   Mildly Thick (2) - (Nectar Thick) Cup;Straw   Thin (0) Cup;Teaspoon   Penetration After the Swallow Pureed (4)   Compensatory Strategies Attempted   Compensatory Strategies Attempted Yes   Multiple Swallows Successful to clear pharyngeal residue   Liquid Wash After Swallow Successful to clear pharyngeal residue   Patient Ability To Protect Airway   Patient Ability To Protect Airway  Adequate   Penetration Aspiration Scale   Penetration Aspiration Scale 5 - Material contacts vocal folds and is not ejected, visible stasis remains   Saniya Pharyngeal Residue Severity   Vallecular Residue Trace, trace coating   Pyriform Sinus Residue  Trace, trace coating   Dysphagia Rating   Nutritional Liquid Intake Rating Scale Thickened beverages (mildly thick unless otherwise specified)   Nutritional Food Intake Rating Scale Total oral diet with multiple consistencies but requiring special preparation or compensations   Problem List   Problem List Dysphagia;Expressive Language Deficit;Cognitive-Language Deficits   Evaluation Recommendations   Diet / Liquid Recommendation Minced & Moist (5) - (Dysphagia II);Mildly Thick  "(2) - (Nectar Thick)   Medication Administration  Float Whole with Puree   Evaluation Recommended Techniques   Swallow Techniques Yes   Techniques Oral Stage Check For Pocketing   Techniques Pharyngeal Phase Thicken Liquids For Better Control Of Bolus To Consistency Of Nectar;Alternate Liquids / Solids Consistency To Wash Foods Through Pharynx   Patient / Family Goals   Patient / Family Goal #1 \"OK\" to ice chips   Short Term Goals   Short Term Goal # 1 Patient will consume prefeeding trials with SLP with no overt s/sx of aspiration.    Goal Outcome # 1 Goal met, new goal added   Short Term Goal # 1 B  Pt will consume MM5/MT2 diet with no overt s/sx of aspiration, given min cues to swallowing strategies.          "

## 2021-08-28 NOTE — PROGRESS NOTES
Hospital Medicine Daily Progress Note    Date of Service  8/28/2021    Chief Complaint/Hospital Course  51 y.o. female with past medical history of COPD, hypertension, CKD, substance abuse, who presented 8/25/2021 with  intracranial hemorrhage in the setting of untreated hypertension and methamphetamine abuse, with a right arm deficit, expressive aphasia and dysphagia.She was intubated 8/25/2021 through 8/26/2021 due to airway protection in the setting of intracranial hemorrhage  Neurosurgery consulted, recommended nonoperative management.  Rehab medicine consulted, prior authorization for admission to inpatient rehab facility pending.      Interval Problem Update  Patient started on dysphagia diet after FEES.  Coretrac removed  On 3 L nasal cannula, blood pressure 171/95.  Blood pressure is not well controlled this morning, will start lisinopril additionally to amlodipine and labetalol.  Headache is not well controlled.  Added tramadol to Tylenol as needed  P.o. potassium replacement ordered.    I have personally seen and examined the patient at bedside. I discussed the plan of care with bedside RN.    Consultants/Specialty  critical care, neurology and neurosurgery    Code Status  Full Code    Disposition  Patient is not medically cleared.   Anticipate discharge to to an inpatient rehabilitation hospital.  I have placed the appropriate orders for post-discharge needs.    Review of Systems  Review of Systems   Unable to perform ROS: Medical condition        Physical Exam  Temp:  [36.4 °C (97.6 °F)-37.4 °C (99.3 °F)] 36.7 °C (98 °F)  Pulse:  [67-92] 82  Resp:  [16-50] 17  BP: (136-206)/() 171/95  SpO2:  [90 %-100 %] 97 %    Physical Exam  Vitals and nursing note reviewed.   Constitutional:       General: She is not in acute distress.     Appearance: Normal appearance.   HENT:      Head: Normocephalic and atraumatic.      Nose: Nose normal.      Mouth/Throat:      Mouth: Mucous membranes are moist.   Eyes:       Extraocular Movements: Extraocular movements intact.      Pupils: Pupils are equal, round, and reactive to light.   Cardiovascular:      Rate and Rhythm: Normal rate and regular rhythm.   Pulmonary:      Effort: Pulmonary effort is normal.      Breath sounds: Normal breath sounds.   Abdominal:      General: Abdomen is flat. There is no distension.      Tenderness: There is no abdominal tenderness.   Musculoskeletal:         General: No swelling or deformity. Normal range of motion.      Cervical back: Normal range of motion and neck supple.   Skin:     General: Skin is warm and dry.   Neurological:      General: No focal deficit present.      Mental Status: She is alert and oriented to person, place, and time.      Cranial Nerves: Dysarthria present.      Motor: Weakness (Right hand weakness) present.      Comments: Expressive aphasia   Psychiatric:         Mood and Affect: Mood normal.         Behavior: Behavior normal.         Fluids    Intake/Output Summary (Last 24 hours) at 8/28/2021 1206  Last data filed at 8/28/2021 1051  Gross per 24 hour   Intake 1320 ml   Output 1375 ml   Net -55 ml       Laboratory  Recent Labs     08/26/21  0600 08/27/21  0430   WBC 13.3* 8.5   RBC 4.56 4.18*   HEMOGLOBIN 12.9 11.7*   HEMATOCRIT 38.6 35.4*   MCV 84.6 84.7   MCH 28.3 28.0   MCHC 33.4* 33.1*   RDW 46.4 46.7   PLATELETCT 189 160*   MPV 9.8 10.6     Recent Labs     08/26/21  0600 08/26/21  0600 08/26/21  1240 08/27/21  0430 08/28/21  0451   SODIUM 140   < > 138 139 138   POTASSIUM 2.9*  --   --  3.2* 3.5*   CHLORIDE 106  --   --  103 101   CO2 23  --   --  24 27   GLUCOSE 108*  --   --  120* 123*   BUN 15  --   --  20 18   CREATININE 0.94  --   --  0.76 0.68   CALCIUM 7.9*  --   --  8.3* 9.0    < > = values in this interval not displayed.                   Imaging  DX-CHEST-PORTABLE (1 VIEW)   Final Result      1.  Lower lung volumes and increased atelectasis following extubation   2.  New patchy opacity in the LEFT mid lung  could be atelectasis or early pneumonia      DX-CHEST-PORTABLE (1 VIEW)   Final Result      1.  Line and tube position as described above. No visible pneumothorax.   2.  LEFT basilar atelectasis or pneumonia      MR-BRAIN-WITH & W/O   Final Result      1.  Approximately 40 mm acute parenchymal hemorrhage at the left posterior frontal convexity. No abnormal vascular flow voids or underlying enhancing mass lesion.   2.  Encephalomalacic change in the left frontal deep white matter along the corona radiata and far posterior aspect of the left head of caudate nucleus consistent with old infarction.   3.  Moderate supratentorial white matter disease most consistent with microvascular ischemic change.   4.  Incidental 9 mm meningioma at the floor of the left middle cranial fossa.   5.  Old lacunar size infarct in the left paramedian hans.   6.  Mild-moderate pontine ischemic gliosis.      EC-ECHOCARDIOGRAM COMPLETE W/O CONT   Final Result      DX-ABDOMEN FOR TUBE PLACEMENT   Final Result      Feeding tube extends below the diaphragm with tip at the level of the proximal duodenum.      DX-CHEST-LIMITED (1 VIEW)   Final Result         1.  Line and tube placements are noted as described above.      2.  No pneumothorax.      3.  Mild perihilar congestion.      4.  No consolidations identified.      CT-HEAD W/O   Final Result         1.  No significant change in left-sided parenchymal hemorrhage since prior CT.      2.  Minimal midline shift to the right side.      3.  No new intracranial hemorrhage is identified.         OUTSIDE IMAGES-CT HEAD   Final Result           Assessment/Plan  * Nontraumatic cortical hemorrhage of left cerebral hemisphere (HCC)- (present on admission)  Assessment & Plan  Hypertensive bleed in the setting of untreated hypertension and methampetamine use  Non-operative management with consultation by Dr. Chanel  She has right arm deficit and expressive aphasia with dysphagia.   javid  feeding  Therapies  Rehab medicine consulted, prior authorization pending for inpatient rehab facility    Hypokalemia  Assessment & Plan  Replaced    Dysphagia  Assessment & Plan  Status post FEES  Started on dysphagia diet, core track removed 8/28    Tobacco abuse- (present on admission)  Assessment & Plan  Cessation discussions  Nicotine patch    Methamphetamine abuse (HCC)- (present on admission)  Assessment & Plan  tox screen is +      Chronic kidney disease- (present on admission)  Assessment & Plan  History of    Hypertensive emergency- (present on admission)  Assessment & Plan  She was started on norvasc and metoprolol  Her BP has remained quite elevated thus change metoprolol to labetalol 200 mg BID  IV prns available  8/28 blood pressure is still not well controlled.  We will add lisinopril.  Titrate up as needed    Acute respiratory failure with hypoxia (HCC)- (present on admission)  Assessment & Plan  She was intubated 8/25/2021 through 8/26/2021 due to airway protection in the setting of intracranial hemorrhage  Wean off oxygen as able       VTE prophylaxis: pharmacologic prophylaxis contraindicated due to Intracranial bleed    I have performed a physical exam and reviewed and updated ROS and Plan today (8/28/2021). In review of yesterday's note (8/27/2021), there are no changes except as documented above.

## 2021-08-29 LAB
ANION GAP SERPL CALC-SCNC: 12 MMOL/L (ref 7–16)
BASOPHILS # BLD AUTO: 0.3 % (ref 0–1.8)
BASOPHILS # BLD: 0.02 K/UL (ref 0–0.12)
BUN SERPL-MCNC: 17 MG/DL (ref 8–22)
CALCIUM SERPL-MCNC: 9.2 MG/DL (ref 8.5–10.5)
CHLORIDE SERPL-SCNC: 102 MMOL/L (ref 96–112)
CO2 SERPL-SCNC: 26 MMOL/L (ref 20–33)
CREAT SERPL-MCNC: 0.78 MG/DL (ref 0.5–1.4)
EOSINOPHIL # BLD AUTO: 0.26 K/UL (ref 0–0.51)
EOSINOPHIL NFR BLD: 4.1 % (ref 0–6.9)
ERYTHROCYTE [DISTWIDTH] IN BLOOD BY AUTOMATED COUNT: 46.2 FL (ref 35.9–50)
GLUCOSE SERPL-MCNC: 110 MG/DL (ref 65–99)
HCT VFR BLD AUTO: 39 % (ref 37–47)
HGB BLD-MCNC: 12.9 G/DL (ref 12–16)
IMM GRANULOCYTES # BLD AUTO: 0.02 K/UL (ref 0–0.11)
IMM GRANULOCYTES NFR BLD AUTO: 0.3 % (ref 0–0.9)
LYMPHOCYTES # BLD AUTO: 1.67 K/UL (ref 1–4.8)
LYMPHOCYTES NFR BLD: 26.3 % (ref 22–41)
MAGNESIUM SERPL-MCNC: 1.8 MG/DL (ref 1.5–2.5)
MCH RBC QN AUTO: 27.9 PG (ref 27–33)
MCHC RBC AUTO-ENTMCNC: 33.1 G/DL (ref 33.6–35)
MCV RBC AUTO: 84.4 FL (ref 81.4–97.8)
MONOCYTES # BLD AUTO: 0.53 K/UL (ref 0–0.85)
MONOCYTES NFR BLD AUTO: 8.3 % (ref 0–13.4)
NEUTROPHILS # BLD AUTO: 3.86 K/UL (ref 2–7.15)
NEUTROPHILS NFR BLD: 60.7 % (ref 44–72)
NRBC # BLD AUTO: 0 K/UL
NRBC BLD-RTO: 0 /100 WBC
PHOSPHATE SERPL-MCNC: 3.7 MG/DL (ref 2.5–4.5)
PLATELET # BLD AUTO: 238 K/UL (ref 164–446)
PMV BLD AUTO: 9.5 FL (ref 9–12.9)
POTASSIUM SERPL-SCNC: 3.8 MMOL/L (ref 3.6–5.5)
RBC # BLD AUTO: 4.62 M/UL (ref 4.2–5.4)
SODIUM SERPL-SCNC: 140 MMOL/L (ref 135–145)
TSH SERPL DL<=0.005 MIU/L-ACNC: 1.67 UIU/ML (ref 0.38–5.33)
WBC # BLD AUTO: 6.4 K/UL (ref 4.8–10.8)

## 2021-08-29 PROCEDURE — 700102 HCHG RX REV CODE 250 W/ 637 OVERRIDE(OP): Performed by: INTERNAL MEDICINE

## 2021-08-29 PROCEDURE — 99232 SBSQ HOSP IP/OBS MODERATE 35: CPT | Performed by: INTERNAL MEDICINE

## 2021-08-29 PROCEDURE — 80048 BASIC METABOLIC PNL TOTAL CA: CPT

## 2021-08-29 PROCEDURE — 84100 ASSAY OF PHOSPHORUS: CPT

## 2021-08-29 PROCEDURE — 85025 COMPLETE CBC W/AUTO DIFF WBC: CPT

## 2021-08-29 PROCEDURE — 83735 ASSAY OF MAGNESIUM: CPT

## 2021-08-29 PROCEDURE — 84443 ASSAY THYROID STIM HORMONE: CPT

## 2021-08-29 PROCEDURE — 36415 COLL VENOUS BLD VENIPUNCTURE: CPT

## 2021-08-29 PROCEDURE — 770020 HCHG ROOM/CARE - TELE (206)

## 2021-08-29 PROCEDURE — 700111 HCHG RX REV CODE 636 W/ 250 OVERRIDE (IP): Performed by: INTERNAL MEDICINE

## 2021-08-29 PROCEDURE — 700101 HCHG RX REV CODE 250: Performed by: INTERNAL MEDICINE

## 2021-08-29 PROCEDURE — A9270 NON-COVERED ITEM OR SERVICE: HCPCS | Performed by: INTERNAL MEDICINE

## 2021-08-29 PROCEDURE — 94640 AIRWAY INHALATION TREATMENT: CPT

## 2021-08-29 PROCEDURE — 94760 N-INVAS EAR/PLS OXIMETRY 1: CPT

## 2021-08-29 RX ORDER — LISINOPRIL 10 MG/1
10 TABLET ORAL
Status: DISCONTINUED | OUTPATIENT
Start: 2021-08-30 | End: 2021-08-30

## 2021-08-29 RX ORDER — LISINOPRIL 5 MG/1
5 TABLET ORAL ONCE
Status: COMPLETED | OUTPATIENT
Start: 2021-08-29 | End: 2021-08-29

## 2021-08-29 RX ADMIN — LABETALOL HYDROCHLORIDE 200 MG: 200 TABLET, FILM COATED ORAL at 05:19

## 2021-08-29 RX ADMIN — HYDRALAZINE HYDROCHLORIDE 10 MG: 20 INJECTION INTRAMUSCULAR; INTRAVENOUS at 21:42

## 2021-08-29 RX ADMIN — HYDRALAZINE HYDROCHLORIDE 10 MG: 20 INJECTION INTRAMUSCULAR; INTRAVENOUS at 11:33

## 2021-08-29 RX ADMIN — LISINOPRIL 5 MG: 5 TABLET ORAL at 09:32

## 2021-08-29 RX ADMIN — IPRATROPIUM BROMIDE AND ALBUTEROL SULFATE 3 ML: .5; 2.5 SOLUTION RESPIRATORY (INHALATION) at 19:52

## 2021-08-29 RX ADMIN — DOCUSATE SODIUM 50 MG AND SENNOSIDES 8.6 MG 2 TABLET: 8.6; 5 TABLET, FILM COATED ORAL at 17:03

## 2021-08-29 RX ADMIN — HYDRALAZINE HYDROCHLORIDE 10 MG: 20 INJECTION INTRAMUSCULAR; INTRAVENOUS at 06:30

## 2021-08-29 RX ADMIN — LABETALOL HYDROCHLORIDE 10 MG: 5 INJECTION INTRAVENOUS at 20:03

## 2021-08-29 RX ADMIN — OXYCODONE 5 MG: 5 TABLET ORAL at 17:03

## 2021-08-29 RX ADMIN — ENALAPRILAT 1.25 MG: 1.25 INJECTION INTRAVENOUS at 00:54

## 2021-08-29 RX ADMIN — ACETAMINOPHEN 650 MG: 325 TABLET, FILM COATED ORAL at 01:10

## 2021-08-29 RX ADMIN — OXYCODONE 5 MG: 5 TABLET ORAL at 05:30

## 2021-08-29 RX ADMIN — OXYCODONE 5 MG: 5 TABLET ORAL at 11:33

## 2021-08-29 RX ADMIN — OXYCODONE 5 MG: 5 TABLET ORAL at 21:33

## 2021-08-29 RX ADMIN — LABETALOL HYDROCHLORIDE 10 MG: 5 INJECTION INTRAVENOUS at 03:37

## 2021-08-29 RX ADMIN — POLYETHYLENE GLYCOL 3350 1 PACKET: 17 POWDER, FOR SOLUTION ORAL at 17:04

## 2021-08-29 RX ADMIN — LISINOPRIL 5 MG: 5 TABLET ORAL at 05:20

## 2021-08-29 RX ADMIN — DOCUSATE SODIUM 50 MG AND SENNOSIDES 8.6 MG 2 TABLET: 8.6; 5 TABLET, FILM COATED ORAL at 05:20

## 2021-08-29 RX ADMIN — AMLODIPINE BESYLATE 10 MG: 5 TABLET ORAL at 05:19

## 2021-08-29 RX ADMIN — IPRATROPIUM BROMIDE AND ALBUTEROL SULFATE 3 ML: .5; 2.5 SOLUTION RESPIRATORY (INHALATION) at 11:44

## 2021-08-29 RX ADMIN — LABETALOL HYDROCHLORIDE 200 MG: 200 TABLET, FILM COATED ORAL at 17:04

## 2021-08-29 RX ADMIN — ACETAMINOPHEN 650 MG: 325 TABLET, FILM COATED ORAL at 13:33

## 2021-08-29 ASSESSMENT — COGNITIVE AND FUNCTIONAL STATUS - GENERAL
STANDING UP FROM CHAIR USING ARMS: A LITTLE
TOILETING: A LITTLE
CLIMB 3 TO 5 STEPS WITH RAILING: A LITTLE
WALKING IN HOSPITAL ROOM: A LITTLE
EATING MEALS: A LITTLE
SUGGESTED CMS G CODE MODIFIER MOBILITY: CJ
PERSONAL GROOMING: A LITTLE
MOVING FROM LYING ON BACK TO SITTING ON SIDE OF FLAT BED: A LITTLE
DRESSING REGULAR LOWER BODY CLOTHING: A LITTLE
SUGGESTED CMS G CODE MODIFIER DAILY ACTIVITY: CJ
MOBILITY SCORE: 20
DAILY ACTIVITIY SCORE: 20

## 2021-08-29 ASSESSMENT — PAIN DESCRIPTION - PAIN TYPE
TYPE: ACUTE PAIN

## 2021-08-29 NOTE — PROGRESS NOTES
Monitor Summary: SR 76-89, AL -0.15, QRS -0.10, QT -0.40, with rare PVC/PAC per strip from the monitor room.

## 2021-08-29 NOTE — PROGRESS NOTES
Hospital Medicine Daily Progress Note    Date of Service  8/29/2021    Chief Complaint/Hospital Course  51 y.o. female with past medical history of COPD, hypertension, CKD, substance abuse, who presented 8/25/2021 with  intracranial hemorrhage in the setting of untreated hypertension and methamphetamine abuse, with a right arm deficit, expressive aphasia and dysphagia.She was intubated 8/25/2021 through 8/26/2021 due to airway protection in the setting of intracranial hemorrhage  Neurosurgery consulted, recommended nonoperative management.  Rehab medicine consulted, prior authorization for admission to inpatient rehab facility pending.      Interval Problem Update  8/28 patient started on dysphagia diet after FEES.  Coretrac removed  On 3 L nasal cannula, blood pressure 171/95.  Blood pressure is not well controlled this morning, will start lisinopril additionally to amlodipine and labetalol.  Headache is not well controlled.  Added tramadol to Tylenol as needed  P.o. potassium replacement ordered.  8/29  Blood pressure is still suboptimally controlled.  Will increase dose of lisinopril to 10 mg once a day  Headache is better.  No neurological status change noted.  Still with significant aphasia and dysarthria.  Right arm/hand weakness is mild.    I have personally seen and examined the patient at bedside. I discussed the plan of care with bedside RN.    Consultants/Specialty  critical care, neurology and neurosurgery    Code Status  Full Code    Disposition  Patient is not medically cleared.   Anticipate discharge to to an inpatient rehabilitation hospital.  I have placed the appropriate orders for post-discharge needs.    Review of Systems  Review of Systems   Unable to perform ROS: Medical condition        Physical Exam  Temp:  [36.2 °C (97.2 °F)-37.1 °C (98.7 °F)] 36.2 °C (97.2 °F)  Pulse:  [74-87] 79  Resp:  [14-20] 17  BP: (142-191)/() 157/80  SpO2:  [94 %-98 %] 96 %    Physical Exam  Vitals and nursing  note reviewed.   Constitutional:       General: She is not in acute distress.     Appearance: Normal appearance.   HENT:      Head: Normocephalic and atraumatic.      Nose: Nose normal.      Mouth/Throat:      Mouth: Mucous membranes are moist.   Eyes:      Extraocular Movements: Extraocular movements intact.      Pupils: Pupils are equal, round, and reactive to light.   Cardiovascular:      Rate and Rhythm: Normal rate and regular rhythm.   Pulmonary:      Effort: Pulmonary effort is normal.      Breath sounds: Normal breath sounds.   Abdominal:      General: Abdomen is flat. There is no distension.      Tenderness: There is no abdominal tenderness.   Musculoskeletal:         General: No swelling or deformity. Normal range of motion.      Cervical back: Normal range of motion and neck supple.   Skin:     General: Skin is warm and dry.   Neurological:      General: No focal deficit present.      Mental Status: She is alert and oriented to person, place, and time.      Cranial Nerves: Dysarthria present.      Motor: Weakness (Right hand weakness) present.      Comments: Expressive aphasia   Psychiatric:         Mood and Affect: Mood normal.         Behavior: Behavior normal.         Fluids    Intake/Output Summary (Last 24 hours) at 8/29/2021 1321  Last data filed at 8/29/2021 0917  Gross per 24 hour   Intake 320 ml   Output --   Net 320 ml       Laboratory  Recent Labs     08/27/21  0430 08/29/21  0501   WBC 8.5 6.4   RBC 4.18* 4.62   HEMOGLOBIN 11.7* 12.9   HEMATOCRIT 35.4* 39.0   MCV 84.7 84.4   MCH 28.0 27.9   MCHC 33.1* 33.1*   RDW 46.7 46.2   PLATELETCT 160* 238   MPV 10.6 9.5     Recent Labs     08/27/21  0430 08/28/21  0451 08/29/21  0501   SODIUM 139 138 140   POTASSIUM 3.2* 3.5* 3.8   CHLORIDE 103 101 102   CO2 24 27 26   GLUCOSE 120* 123* 110*   BUN 20 18 17   CREATININE 0.76 0.68 0.78   CALCIUM 8.3* 9.0 9.2                   Imaging  DX-CHEST-PORTABLE (1 VIEW)   Final Result      1.  Lower lung volumes  and increased atelectasis following extubation   2.  New patchy opacity in the LEFT mid lung could be atelectasis or early pneumonia      DX-CHEST-PORTABLE (1 VIEW)   Final Result      1.  Line and tube position as described above. No visible pneumothorax.   2.  LEFT basilar atelectasis or pneumonia      MR-BRAIN-WITH & W/O   Final Result      1.  Approximately 40 mm acute parenchymal hemorrhage at the left posterior frontal convexity. No abnormal vascular flow voids or underlying enhancing mass lesion.   2.  Encephalomalacic change in the left frontal deep white matter along the corona radiata and far posterior aspect of the left head of caudate nucleus consistent with old infarction.   3.  Moderate supratentorial white matter disease most consistent with microvascular ischemic change.   4.  Incidental 9 mm meningioma at the floor of the left middle cranial fossa.   5.  Old lacunar size infarct in the left paramedian hans.   6.  Mild-moderate pontine ischemic gliosis.      EC-ECHOCARDIOGRAM COMPLETE W/O CONT   Final Result      DX-ABDOMEN FOR TUBE PLACEMENT   Final Result      Feeding tube extends below the diaphragm with tip at the level of the proximal duodenum.      DX-CHEST-LIMITED (1 VIEW)   Final Result         1.  Line and tube placements are noted as described above.      2.  No pneumothorax.      3.  Mild perihilar congestion.      4.  No consolidations identified.      CT-HEAD W/O   Final Result         1.  No significant change in left-sided parenchymal hemorrhage since prior CT.      2.  Minimal midline shift to the right side.      3.  No new intracranial hemorrhage is identified.         OUTSIDE IMAGES-CT HEAD   Final Result           Assessment/Plan  * Nontraumatic cortical hemorrhage of left cerebral hemisphere (HCC)- (present on admission)  Assessment & Plan  Hypertensive bleed in the setting of untreated hypertension and methampetamine use  Non-operative management with consultation by   Yanique  She has right arm deficit and expressive aphasia with dysphagia.   cortrak feeding  Therapies  Rehab medicine consulted, prior authorization pending for inpatient rehab facility    Hypokalemia  Assessment & Plan  Replaced    Dysphagia  Assessment & Plan  Status post FEES  Started on dysphagia diet, core track removed 8/28    Tobacco abuse- (present on admission)  Assessment & Plan  Cessation discussions  Nicotine patch    Methamphetamine abuse (HCC)- (present on admission)  Assessment & Plan  tox screen is +      Chronic kidney disease- (present on admission)  Assessment & Plan  History of    Hypertensive emergency- (present on admission)  Assessment & Plan  She was started on norvasc and metoprolol  Her BP has remained quite elevated thus change metoprolol to labetalol 200 mg BID  IV prns available  8/28 blood pressure is still not well controlled.  We will add lisinopril.  Titrate up as needed  8/29 increase dose of lisinopril to 10 mg once a day, continue amlodipine, labetalol    Acute respiratory failure with hypoxia (HCC)- (present on admission)  Assessment & Plan  She was intubated 8/25/2021 through 8/26/2021 due to airway protection in the setting of intracranial hemorrhage  Wean off oxygen as able       VTE prophylaxis: pharmacologic prophylaxis contraindicated due to Intracranial bleed    I have performed a physical exam and reviewed and updated ROS and Plan today (8/29/2021). In review of yesterday's note (8/28/2021), there are no changes except as documented above.

## 2021-08-29 NOTE — PROGRESS NOTES
Monitor summary: SR 77-85, OR .15, QRS .07, QT .39 with rare PVC's per strip from monitor room.

## 2021-08-30 ENCOUNTER — APPOINTMENT (OUTPATIENT)
Dept: RADIOLOGY | Facility: MEDICAL CENTER | Age: 51
DRG: 064 | End: 2021-08-30
Attending: INTERNAL MEDICINE
Payer: MEDICAID

## 2021-08-30 ENCOUNTER — PATIENT OUTREACH (OUTPATIENT)
Dept: HEALTH INFORMATION MANAGEMENT | Facility: OTHER | Age: 51
End: 2021-08-30

## 2021-08-30 ENCOUNTER — HOME HEALTH ADMISSION (OUTPATIENT)
Dept: HOME HEALTH SERVICES | Facility: HOME HEALTHCARE | Age: 51
End: 2021-08-30
Payer: MEDICAID

## 2021-08-30 VITALS
DIASTOLIC BLOOD PRESSURE: 84 MMHG | SYSTOLIC BLOOD PRESSURE: 139 MMHG | TEMPERATURE: 98.2 F | RESPIRATION RATE: 17 BRPM | BODY MASS INDEX: 30.52 KG/M2 | WEIGHT: 194.45 LBS | OXYGEN SATURATION: 97 % | HEIGHT: 67 IN | HEART RATE: 89 BPM

## 2021-08-30 PROBLEM — R51.9 HEADACHE: Status: ACTIVE | Noted: 2021-08-30

## 2021-08-30 LAB
ALBUMIN SERPL BCP-MCNC: 3.3 G/DL (ref 3.2–4.9)
ALBUMIN/GLOB SERPL: 1.1 G/DL
ALP SERPL-CCNC: 92 U/L (ref 30–99)
ALT SERPL-CCNC: 17 U/L (ref 2–50)
ANION GAP SERPL CALC-SCNC: 11 MMOL/L (ref 7–16)
AST SERPL-CCNC: 18 U/L (ref 12–45)
BILIRUB SERPL-MCNC: 0.3 MG/DL (ref 0.1–1.5)
BUN SERPL-MCNC: 16 MG/DL (ref 8–22)
CALCIUM SERPL-MCNC: 9.2 MG/DL (ref 8.5–10.5)
CHLORIDE SERPL-SCNC: 99 MMOL/L (ref 96–112)
CO2 SERPL-SCNC: 27 MMOL/L (ref 20–33)
CREAT SERPL-MCNC: 0.81 MG/DL (ref 0.5–1.4)
CRP SERPL HS-MCNC: 4.26 MG/DL (ref 0–0.75)
GLOBULIN SER CALC-MCNC: 2.9 G/DL (ref 1.9–3.5)
GLUCOSE SERPL-MCNC: 101 MG/DL (ref 65–99)
POTASSIUM SERPL-SCNC: 4 MMOL/L (ref 3.6–5.5)
PREALB SERPL-MCNC: 16.3 MG/DL (ref 18–38)
PROT SERPL-MCNC: 6.2 G/DL (ref 6–8.2)
SODIUM SERPL-SCNC: 137 MMOL/L (ref 135–145)

## 2021-08-30 PROCEDURE — 97535 SELF CARE MNGMENT TRAINING: CPT

## 2021-08-30 PROCEDURE — 80053 COMPREHEN METABOLIC PANEL: CPT

## 2021-08-30 PROCEDURE — 84134 ASSAY OF PREALBUMIN: CPT

## 2021-08-30 PROCEDURE — 92523 SPEECH SOUND LANG COMPREHEN: CPT

## 2021-08-30 PROCEDURE — A9270 NON-COVERED ITEM OR SERVICE: HCPCS | Performed by: INTERNAL MEDICINE

## 2021-08-30 PROCEDURE — 92526 ORAL FUNCTION THERAPY: CPT

## 2021-08-30 PROCEDURE — 86140 C-REACTIVE PROTEIN: CPT

## 2021-08-30 PROCEDURE — 99406 BEHAV CHNG SMOKING 3-10 MIN: CPT

## 2021-08-30 PROCEDURE — 700102 HCHG RX REV CODE 250 W/ 637 OVERRIDE(OP): Performed by: INTERNAL MEDICINE

## 2021-08-30 PROCEDURE — 700111 HCHG RX REV CODE 636 W/ 250 OVERRIDE (IP): Performed by: INTERNAL MEDICINE

## 2021-08-30 PROCEDURE — 36415 COLL VENOUS BLD VENIPUNCTURE: CPT

## 2021-08-30 PROCEDURE — 94664 DEMO&/EVAL PT USE INHALER: CPT

## 2021-08-30 PROCEDURE — 97530 THERAPEUTIC ACTIVITIES: CPT | Mod: CQ

## 2021-08-30 PROCEDURE — 70450 CT HEAD/BRAIN W/O DYE: CPT

## 2021-08-30 PROCEDURE — 97116 GAIT TRAINING THERAPY: CPT | Mod: CQ

## 2021-08-30 PROCEDURE — 99239 HOSP IP/OBS DSCHRG MGMT >30: CPT | Performed by: INTERNAL MEDICINE

## 2021-08-30 RX ORDER — IPRATROPIUM BROMIDE AND ALBUTEROL SULFATE 2.5; .5 MG/3ML; MG/3ML
3 SOLUTION RESPIRATORY (INHALATION)
Status: DISCONTINUED | OUTPATIENT
Start: 2021-08-30 | End: 2021-08-30 | Stop reason: CLARIF

## 2021-08-30 RX ORDER — LISINOPRIL 20 MG/1
20 TABLET ORAL
Status: DISCONTINUED | OUTPATIENT
Start: 2021-08-31 | End: 2021-08-30 | Stop reason: HOSPADM

## 2021-08-30 RX ORDER — AMOXICILLIN 250 MG
2 CAPSULE ORAL 2 TIMES DAILY
Status: DISCONTINUED | OUTPATIENT
Start: 2021-08-30 | End: 2021-08-30 | Stop reason: HOSPADM

## 2021-08-30 RX ORDER — ALBUTEROL SULFATE 90 UG/1
2 AEROSOL, METERED RESPIRATORY (INHALATION) EVERY 4 HOURS PRN
Status: DISCONTINUED | OUTPATIENT
Start: 2021-08-30 | End: 2021-08-30 | Stop reason: HOSPADM

## 2021-08-30 RX ORDER — METOCLOPRAMIDE HYDROCHLORIDE 5 MG/ML
10 INJECTION INTRAMUSCULAR; INTRAVENOUS ONCE
Status: COMPLETED | OUTPATIENT
Start: 2021-08-30 | End: 2021-08-30

## 2021-08-30 RX ORDER — DIPHENHYDRAMINE HYDROCHLORIDE 50 MG/ML
12.5 INJECTION INTRAMUSCULAR; INTRAVENOUS ONCE
Status: COMPLETED | OUTPATIENT
Start: 2021-08-30 | End: 2021-08-30

## 2021-08-30 RX ORDER — LABETALOL 200 MG/1
200 TABLET, FILM COATED ORAL 2 TIMES DAILY
Qty: 60 TABLET | Refills: 1 | Status: SHIPPED | OUTPATIENT
Start: 2021-08-30 | End: 2021-11-16 | Stop reason: SDUPTHER

## 2021-08-30 RX ORDER — LORAZEPAM 1 MG/1
0.5 TABLET ORAL ONCE
Status: COMPLETED | OUTPATIENT
Start: 2021-08-30 | End: 2021-08-30

## 2021-08-30 RX ORDER — LISINOPRIL 20 MG/1
20 TABLET ORAL DAILY
Qty: 30 TABLET | Refills: 1 | Status: SHIPPED | OUTPATIENT
Start: 2021-08-31 | End: 2021-09-20

## 2021-08-30 RX ORDER — BISACODYL 10 MG
10 SUPPOSITORY, RECTAL RECTAL
Status: DISCONTINUED | OUTPATIENT
Start: 2021-08-30 | End: 2021-08-30 | Stop reason: HOSPADM

## 2021-08-30 RX ORDER — OXYCODONE HYDROCHLORIDE 5 MG/1
5 TABLET ORAL EVERY 4 HOURS PRN
Qty: 18 TABLET | Refills: 0 | Status: SHIPPED | OUTPATIENT
Start: 2021-08-30 | End: 2021-09-02

## 2021-08-30 RX ORDER — AMLODIPINE BESYLATE 10 MG/1
10 TABLET ORAL DAILY
Qty: 30 TABLET | Refills: 1 | Status: SHIPPED | OUTPATIENT
Start: 2021-08-31 | End: 2021-09-20

## 2021-08-30 RX ORDER — ALBUTEROL SULFATE 90 UG/1
2 AEROSOL, METERED RESPIRATORY (INHALATION) EVERY 4 HOURS PRN
Qty: 8.5 G | Refills: 0 | Status: SHIPPED | OUTPATIENT
Start: 2021-08-30 | End: 2023-08-17

## 2021-08-30 RX ORDER — POLYETHYLENE GLYCOL 3350 17 G/17G
1 POWDER, FOR SOLUTION ORAL
Status: DISCONTINUED | OUTPATIENT
Start: 2021-08-30 | End: 2021-08-30 | Stop reason: HOSPADM

## 2021-08-30 RX ORDER — LISINOPRIL 10 MG/1
10 TABLET ORAL ONCE
Status: COMPLETED | OUTPATIENT
Start: 2021-08-30 | End: 2021-08-30

## 2021-08-30 RX ADMIN — HYDRALAZINE HYDROCHLORIDE 10 MG: 20 INJECTION INTRAMUSCULAR; INTRAVENOUS at 16:31

## 2021-08-30 RX ADMIN — ENALAPRILAT 1.25 MG: 1.25 INJECTION INTRAVENOUS at 00:05

## 2021-08-30 RX ADMIN — OXYCODONE 5 MG: 5 TABLET ORAL at 06:44

## 2021-08-30 RX ADMIN — AMLODIPINE BESYLATE 10 MG: 5 TABLET ORAL at 04:17

## 2021-08-30 RX ADMIN — LISINOPRIL 10 MG: 10 TABLET ORAL at 07:53

## 2021-08-30 RX ADMIN — ACETAMINOPHEN 650 MG: 325 TABLET, FILM COATED ORAL at 05:26

## 2021-08-30 RX ADMIN — LABETALOL HYDROCHLORIDE 200 MG: 200 TABLET, FILM COATED ORAL at 16:31

## 2021-08-30 RX ADMIN — LABETALOL HYDROCHLORIDE 200 MG: 200 TABLET, FILM COATED ORAL at 04:17

## 2021-08-30 RX ADMIN — METOCLOPRAMIDE 10 MG: 5 INJECTION, SOLUTION INTRAMUSCULAR; INTRAVENOUS at 09:33

## 2021-08-30 RX ADMIN — MAGNESIUM HYDROXIDE 30 ML: 400 SUSPENSION ORAL at 05:22

## 2021-08-30 RX ADMIN — ACETAMINOPHEN 650 MG: 325 TABLET, FILM COATED ORAL at 00:05

## 2021-08-30 RX ADMIN — OXYCODONE 5 MG: 5 TABLET ORAL at 17:52

## 2021-08-30 RX ADMIN — TRAMADOL HYDROCHLORIDE 50 MG: 50 TABLET, FILM COATED ORAL at 04:22

## 2021-08-30 RX ADMIN — LORAZEPAM 0.5 MG: 1 TABLET ORAL at 09:32

## 2021-08-30 RX ADMIN — DOCUSATE SODIUM 50 MG AND SENNOSIDES 8.6 MG 2 TABLET: 8.6; 5 TABLET, FILM COATED ORAL at 04:16

## 2021-08-30 RX ADMIN — DIPHENHYDRAMINE HYDROCHLORIDE 12.5 MG: 50 INJECTION INTRAMUSCULAR; INTRAVENOUS at 09:32

## 2021-08-30 RX ADMIN — LISINOPRIL 10 MG: 10 TABLET ORAL at 05:22

## 2021-08-30 RX ADMIN — ACETAMINOPHEN 650 MG: 325 TABLET, FILM COATED ORAL at 17:52

## 2021-08-30 ASSESSMENT — ENCOUNTER SYMPTOMS
NAUSEA: 0
BACK PAIN: 0
PALPITATIONS: 0
VOMITING: 0
HEARTBURN: 0
NERVOUS/ANXIOUS: 0
POLYDIPSIA: 0
CHILLS: 0
SPUTUM PRODUCTION: 0
BLURRED VISION: 0
DOUBLE VISION: 0
FLANK PAIN: 0
HEMOPTYSIS: 0
HALLUCINATIONS: 0
SPEECH CHANGE: 0
ORTHOPNEA: 0
HEADACHES: 1
FOCAL WEAKNESS: 1
WEIGHT LOSS: 0
NECK PAIN: 0
FEVER: 0
BRUISES/BLEEDS EASILY: 0
PHOTOPHOBIA: 0
COUGH: 0
TREMORS: 0

## 2021-08-30 ASSESSMENT — COGNITIVE AND FUNCTIONAL STATUS - GENERAL
SUGGESTED CMS G CODE MODIFIER MOBILITY: CK
WALKING IN HOSPITAL ROOM: A LITTLE
TURNING FROM BACK TO SIDE WHILE IN FLAT BAD: A LITTLE
STANDING UP FROM CHAIR USING ARMS: A LITTLE
HELP NEEDED FOR BATHING: A LITTLE
MOBILITY SCORE: 17
DAILY ACTIVITIY SCORE: 22
SUGGESTED CMS G CODE MODIFIER DAILY ACTIVITY: CJ
MOVING FROM LYING ON BACK TO SITTING ON SIDE OF FLAT BED: A LITTLE
MOVING TO AND FROM BED TO CHAIR: A LITTLE
CLIMB 3 TO 5 STEPS WITH RAILING: A LOT
TOILETING: A LITTLE

## 2021-08-30 ASSESSMENT — LIFESTYLE VARIABLES
TOTAL SCORE: 0
TOTAL SCORE: 0
ON A TYPICAL DAY WHEN YOU DRINK ALCOHOL HOW MANY DRINKS DO YOU HAVE: 0
HAVE PEOPLE ANNOYED YOU BY CRITICIZING YOUR DRINKING: NO
TOTAL SCORE: 0
EVER FELT BAD OR GUILTY ABOUT YOUR DRINKING: NO
AVERAGE NUMBER OF DAYS PER WEEK YOU HAVE A DRINK CONTAINING ALCOHOL: 0
HAVE YOU EVER FELT YOU SHOULD CUT DOWN ON YOUR DRINKING: NO
SUBSTANCE_ABUSE: 0
HOW MANY TIMES IN THE PAST YEAR HAVE YOU HAD 5 OR MORE DRINKS IN A DAY: 0
CONSUMPTION TOTAL: NEGATIVE
ALCOHOL_USE: NO
EVER HAD A DRINK FIRST THING IN THE MORNING TO STEADY YOUR NERVES TO GET RID OF A HANGOVER: NO

## 2021-08-30 ASSESSMENT — GAIT ASSESSMENTS
DISTANCE (FEET): 125
GAIT LEVEL OF ASSIST: MINIMAL ASSIST
DEVIATION: DECREASED BASE OF SUPPORT;BRADYKINETIC;SHUFFLED GAIT;OTHER (COMMENT)
ASSISTIVE DEVICE: HAND HELD ASSIST

## 2021-08-30 ASSESSMENT — PAIN DESCRIPTION - PAIN TYPE
TYPE: ACUTE PAIN

## 2021-08-30 NOTE — CARE PLAN
The patient is Watcher - Medium risk of patient condition declining or worsening    Shift Goals  Clinical Goals: monitor blood pressure  Patient Goals: rest  Family Goals: FAN    Progress made toward(s) clinical / shift goals:  Pharmacological intervention in place to maintain blood pressure. Provided calm and reduced stimulus environment for patient comfort and relaxation. Hourly rounding provided.     Patient is not progressing towards the following goals:      Problem: Bowel Elimination  Goal: Establish and maintain regular bowel function  Outcome: Not Progressing  Note: Patient last bowel PTA. Pharmacological intervention in place.

## 2021-08-30 NOTE — DISCHARGE PLANNING
Insurance remains pending.  Would appreciate updated PT/OT evals once appropriate.     1112-Spoke with Heide and her daughter regarding [facility] and they both have chosen to go home.  They feel she will be safe @ her other daughters home.  [facility] will no longer follow.  Please reach out top myself @ [phone] with any questions.  Msg placed to Mahnaz SHARMA

## 2021-08-30 NOTE — THERAPY
"Physical Therapy   Daily Treatment     Patient Name: Heide Rangel  Age:  51 y.o., Sex:  female  Medical Record #: [id number]  Today's Date: 8/30/2021     Precautions  Precautions: Fall Risk;Swallow Precautions ( See Comments)  Comments: BP goal <140/90 2' to recent [facility]    Assessment    Pt presenting w/ improved activity tolerance. She was limited by cognition and poor safety awareness. Pt unwilling to utilize FWW despite constantly grabbing for things to hold onto and unsteady gait. Pt very impulsive w/ transfers and almost fell while transferring to the chair. Pt demonstrating decreased balance w/ fatigue. She also had increased aphasia w/ R sided weakness as she fatigued. Pt educated on importance of post acute placement w/ pt not internalizing education.    Plan    Continue current treatment plan.    DC Equipment Recommendations: Unable to determine at this time  Discharge Recommendations: Recommend post-acute placement for additional physical therapy services prior to discharge home      Subjective    \"I can do 3 hours of therapy a day at home.\"     Objective       08/30/21 0934   Precautions   Precautions Fall Risk;Swallow Precautions ( See Comments)   Comments BP goal <140/90 2' to recent [facility]   Balance   Sitting Balance (Static) Fair +   Sitting Balance (Dynamic) Fair   Standing Balance (Static) Fair -   Standing Balance (Dynamic) Poor +   Weight Shift Sitting Fair   Weight Shift Standing Poor   Gait Analysis   Gait Level Of Assist Minimal Assist   Assistive Device Hand Held Assist   Distance (Feet) 125   # of Times Distance was Traveled 1   Deviation Decreased Base Of Support;Bradykinetic;Shuffled Gait;Other (Comment)  (Grabbing for railing on wall)   Comments Pt not willing to use to FWW. Pt continually grabbing for things to hold onto during gait. Poor foot placement control on the R.   Bed Mobility    Supine to Sit   (NT up w/ OT)   Sit to Supine   (Up in chair post)   Scooting Minimal Assist   Functional " Mobility   Sit to Stand Minimal Assist   Bed, Chair, Wheelchair Transfer Minimal Assist   Transfer Method Other (Comments)  (Ambulating)   Mobility No AD. Pt very impulsive w/ transfers. Needs cues to not sit prior to being in front of chair.   Short Term Goals    Short Term Goal # 1 pt will be able to perform sit<>stand/transfers with FWW with SPC with Spv in 6tx to promote fnx progression towards I    Goal Outcome # 1 goal not met   Short Term Goal # 2 pt will be able to perform sit<>stand/transfers with SPC with Spv in 6tx to promote fnx progression towards i    Goal Outcome # 2 Goal not met   Short Term Goal # 3 pt will be able to ambulate 150ft with SPC with SPv in 6tx to promote fnx progression towards I    Goal Outcome # 3 Goal not met   Short Term Goal # 4 pt will be able to ambulate up/down 2 steps with min A and SPC in 6tx to promote fnx progression towards I    Goal Outcome # 4 Goal not met

## 2021-08-30 NOTE — DISCHARGE SUMMARY
Discharge Summary    CHIEF COMPLAINT ON ADMISSION  Chief Complaint   Patient presents with   • Possible Stroke       Reason for Admission  EMS     Admission Date  8/25/2021    CODE STATUS  Full Code    HPI & HOSPITAL COURSE  This is a 51 y.o. female with past medical history of COPD, smoking cigarettes, hypertension, CKD 3, methamphetamine abuse, presented 8/25/2021 with  intracranial hemorrhage in the setting of untreated hypertension and methamphetamine abuse, with a right arm deficit, expressive aphasia and dysphagia.She was intubated 8/25/2021 through 8/26/2021 due to airway protection in the setting of intracranial hemorrhage, was treated with IV nicardipine drip.  Neurology and neurosurgery consulted, recommended nonoperative management.  Patient started on blood pressure medications, doses adjusted for optimal blood pressure control  SLP consulted, status post FEES.  Core track removed on 8/28, patient was started on dysphagia diet.  Patient was weaned off oxygen.  Patient has persistent aphasia, dysarthria and mild right hand weakness  Recommended to follow-up with neurosurgery with repeat MRI of the brain in 6 months  Follow-up with [facility]y [facility].  Blood pressure control; monitor blood pressure control and follow-up with PCP.  Blood pressure medication compliance counseling provided  Repeat CT head without contrast showed stable left frontal lobe intracranial hemorrhage.  Illicit drug counseling provided.  PT OT evaluated and physiatrist consulted.  Recommended inpatient rehab.  Patient refused inpatient rehab.  She did not qualify for home health as she has no PCP.  Patient elected to go home with outpatient OT and SLP treatment.  Therefore, she is discharged in fair and stable condition to home with close outpatient follow-up.    The patient met 2-midnight criteria for an inpatient stay at the time of discharge.    Discharge Date  8/30/2021    FOLLOW UP ITEMS POST DISCHARGE  Neurology  [facility]  Neurosurgery Dr. Chanel    DISCHARGE DIAGNOSES  Principal Problem:    Nontraumatic cortical hemorrhage of left cerebral hemisphere (HCC) POA: Yes  Active Problems:    Acute respiratory failure with hypoxia (HCC) POA: Yes    Hypertensive emergency POA: Yes    Acute pulmonary edema (HCC) POA: Unknown    Chronic kidney disease POA: Yes    Methamphetamine abuse (HCC) POA: Yes    Tobacco abuse POA: Yes    Dysphagia POA: Unknown    Hypokalemia POA: Unknown    Headache POA: Unknown  Resolved Problems:    Drug use POA: Unknown      FOLLOW UP  No future appointments.  No follow-up provider specified.    MEDICATIONS ON DISCHARGE     Medication List      START taking these medications      Instructions   amLODIPine 10 MG Tabs  Start taking on: August 31, 2021  Commonly known as: NORVASC   Take 1 Tablet by mouth every day.  Dose: 10 mg     labetalol 200 MG Tabs  Commonly known as: NORMODYNE   Take 1 Tablet by mouth 2 times a day.  Dose: 200 mg     lisinopril 20 MG Tabs  Start taking on: August 31, 2021  Commonly known as: PRINIVIL   Take 1 Tablet by mouth every day.  Dose: 20 mg     oxyCODONE immediate-release 5 MG Tabs  Commonly known as: ROXICODONE   Take 1 Tablet by mouth every four hours as needed for up to 3 days.  Dose: 5 mg            Allergies  Not on File    DIET  Orders Placed This Encounter   Procedures   • Diet Order Diet: Level 6 - Soft and Bite Sized; Liquid level: Level 0 - Thin; Tray Modifications (optional): SLP - 1:1 Supervision by Nursing, SLP - Deliver to Nursing Station     Standing Status:   Standing     Number of Occurrences:   1     Order Specific Question:   Diet:     Answer:   Level 6 - Soft and Bite Sized [23]     Order Specific Question:   Liquid level     Answer:   Level 0 - Thin     Order Specific Question:   Tray Modifications (optional)     Answer:   SLP - 1:1 Supervision by Nursing     Order Specific Question:   Tray Modifications (optional)     Answer:   SLP - Deliver to Nursing  Station       ACTIVITY  As tolerated.  Weight bearing as tolerated    CONSULTATIONS  Neurology  Neurosurgery  Critical care    PROCEDURES  None    LABORATORY  Lab Results   Component Value Date    SODIUM 137 08/30/2021    POTASSIUM 4.0 08/30/2021    CHLORIDE 99 08/30/2021    CO2 27 08/30/2021    GLUCOSE 101 (H) 08/30/2021    BUN 16 08/30/2021    CREATININE 0.81 08/30/2021        Lab Results   Component Value Date    WBC 6.4 08/29/2021    HEMOGLOBIN 12.9 08/29/2021    HEMATOCRIT 39.0 08/29/2021    PLATELETCT 238 08/29/2021      CT-HEAD W/O   Final Result      Stable left frontal intraparenchymal hematoma. No evidence of new acute intracranial hemorrhage.      DX-CHEST-PORTABLE (1 VIEW)   Final Result      1.  Lower lung volumes and increased atelectasis following extubation   2.  New patchy opacity in the LEFT mid lung could be atelectasis or early pneumonia      DX-CHEST-PORTABLE (1 VIEW)   Final Result      1.  Line and tube position as described above. No visible pneumothorax.   2.  LEFT basilar atelectasis or pneumonia      MR-BRAIN-WITH & W/O   Final Result      1.  Approximately 40 mm acute parenchymal hemorrhage at the left posterior frontal convexity. No abnormal vascular flow voids or underlying enhancing mass lesion.   2.  Encephalomalacic change in the left frontal deep white matter along the corona radiata and far posterior aspect of the left head of caudate nucleus consistent with old infarction.   3.  Moderate supratentorial white matter disease most consistent with microvascular ischemic change.   4.  Incidental 9 mm meningioma at the floor of the left middle cranial fossa.   5.  Old lacunar size infarct in the left paramedian hans.   6.  Mild-moderate pontine ischemic gliosis.      EC-ECHOCARDIOGRAM COMPLETE W/O CONT   Final Result      DX-ABDOMEN FOR TUBE PLACEMENT   Final Result      Feeding tube extends below the diaphragm with tip at the level of the proximal duodenum.      DX-CHEST-LIMITED (1  VIEW)   Final Result         1.  Line and tube placements are noted as described above.      2.  No pneumothorax.      3.  Mild perihilar congestion.      4.  No consolidations identified.      CT-HEAD W/O   Final Result         1.  No significant change in left-sided parenchymal hemorrhage since prior CT.      2.  Minimal midline shift to the right side.      3.  No new intracranial hemorrhage is identified.         OUTSIDE IMAGES-CT HEAD   Final Result            Total time of the discharge process exceeds 37 minutes.

## 2021-08-30 NOTE — DIETARY
Nutrition Services: Update   Day 5 of admit.  Heide Rangel is a 51 y.o. female with admitting DX of Intracranial hemorrhage.    Pt is currently on Level 6 - soft and bite sized, thin liquid diet. Pt passed FEES and cortrak was removed on 8/28. Recorded PO intake varies <25% to 50-75%. Per RN, pt ate some mac and cheese today. Pt occupied at time of visit. Daughters at bedside and state diet was advanced today and they are hopeful appetite will improve with advanced diet.    Recommendations/Plan:  1. Encourage intake of meals >50%  2. Document intake of all meals as % taken in ADL's to provide interdisciplinary communication across all shifts.   3. Monitor weight.  4. Nutrition rep will continue to see patient for ongoing meal and snack preferences.  5. Obtain supplement order per RD as needed.    RD following

## 2021-08-30 NOTE — CARE PLAN
The patient is Stable - Low risk of patient condition declining or worsening    Shift Goals  Clinical Goals: monitor blood pressure  Patient Goals: Sleep  Family Goals: FAN    Progress made toward(s) clinical / shift goals:  Patient was hypertensive throughout shift. Managed with scheduled and PRN BP meds    Patient is not progressing towards the following goals:      Problem: Psychosocial  Goal: Patient's level of anxiety will decrease  Outcome: Not Progressing   Patient experiences anxiety occasionally throughout shift

## 2021-08-30 NOTE — PROGRESS NOTES
Hospital Medicine Daily Progress Note    Date of Service  8/30/2021    Chief Complaint/Hospital Course  51 y.o. female with past medical history of COPD, hypertension, CKD, substance abuse, who presented 8/25/2021 with  intracranial hemorrhage in the setting of untreated hypertension and methamphetamine abuse, with a right arm deficit, expressive aphasia and dysphagia.She was intubated 8/25/2021 through 8/26/2021 due to airway protection in the setting of intracranial hemorrhage  Neurosurgery consulted, recommended nonoperative management.  Rehab medicine consulted, prior authorization for admission to inpatient rehab facility pending.      Interval Problem Update  8/28 patient started on dysphagia diet after FEES.  Coretrac removed  On 3 L nasal cannula, blood pressure 171/95.  Blood pressure is not well controlled this morning, will start lisinopril additionally to amlodipine and labetalol.  Headache is not well controlled.  Added tramadol to Tylenol as needed  P.o. potassium replacement ordered.  8/29  Blood pressure is still suboptimally controlled.  Will increase dose of lisinopril to 10 mg once a day  Headache is better.  No neurological status change noted.  Still with significant aphasia and dysarthria.  Right arm/hand weakness is mild.  8/30  Complaints of headache 10 out of 10 this morning, oxycodone and Tylenol was not helping.  Ordered Benadryl, metoclopramide.  We will repeat CT head without contrast to rule out ICH extension.  We will increase dose of lisinopril to 20 mg once a day for better blood pressure control.  Patient refused to go to inpatient rehab.  Family is supportive.  Ordered home health.    I have personally seen and examined the patient at bedside. I discussed the plan of care with bedside RN.    Consultants/Specialty  critical care, neurology and neurosurgery    Code Status  Full Code    Disposition  Patient is not medically cleared.   Anticipate discharge to to home with organized  home healthcare and close outpatient follow-up.  I have placed the appropriate orders for post-discharge needs.    Review of Systems  Review of Systems   Constitutional: Negative for chills, fever and weight loss.   HENT: Negative for ear pain, hearing loss and tinnitus.    Eyes: Negative for blurred vision, double vision and photophobia.   Respiratory: Negative for cough, hemoptysis and sputum production.    Cardiovascular: Negative for chest pain, palpitations and orthopnea.   Gastrointestinal: Negative for heartburn, nausea and vomiting.   Genitourinary: Negative for dysuria, flank pain, frequency and hematuria.   Musculoskeletal: Negative for back pain, joint pain and neck pain.   Skin: Negative for itching and rash.   Neurological: Positive for focal weakness and headaches. Negative for tremors and speech change.   Endo/Heme/Allergies: Negative for environmental allergies and polydipsia. Does not bruise/bleed easily.   Psychiatric/Behavioral: Negative for hallucinations and substance abuse. The patient is not nervous/anxious.         Physical Exam  Temp:  [36.3 °C (97.3 °F)-36.6 °C (97.8 °F)] 36.6 °C (97.8 °F)  Pulse:  [70-87] 74  Resp:  [16-18] 16  BP: (128-209)/() 146/71  SpO2:  [93 %-100 %] 93 %    Physical Exam  Vitals and nursing note reviewed.   Constitutional:       General: She is not in acute distress.     Appearance: Normal appearance.   HENT:      Head: Normocephalic and atraumatic.      Nose: Nose normal.      Mouth/Throat:      Mouth: Mucous membranes are moist.   Eyes:      Extraocular Movements: Extraocular movements intact.      Pupils: Pupils are equal, round, and reactive to light.   Cardiovascular:      Rate and Rhythm: Normal rate and regular rhythm.   Pulmonary:      Effort: Pulmonary effort is normal.      Breath sounds: Normal breath sounds.   Abdominal:      General: Abdomen is flat. There is no distension.      Tenderness: There is no abdominal tenderness.   Musculoskeletal:          General: No swelling or deformity. Normal range of motion.      Cervical back: Normal range of motion and neck supple.   Skin:     General: Skin is warm and dry.   Neurological:      General: No focal deficit present.      Mental Status: She is alert and oriented to person, place, and time.      Cranial Nerves: Dysarthria present.      Motor: Weakness (Right hand weakness) present.      Comments: Expressive aphasia   Psychiatric:         Mood and Affect: Mood normal.         Behavior: Behavior normal.         Fluids  No intake or output data in the 24 hours ending 08/30/21 1324    Laboratory  Recent Labs     08/29/21  0501   WBC 6.4   RBC 4.62   HEMOGLOBIN 12.9   HEMATOCRIT 39.0   MCV 84.4   MCH 27.9   MCHC 33.1*   RDW 46.2   PLATELETCT 238   MPV 9.5     Recent Labs     08/28/21  0451 08/29/21  0501 08/30/21  0123   SODIUM 138 140 137   POTASSIUM 3.5* 3.8 4.0   CHLORIDE 101 102 99   CO2 27 26 27   GLUCOSE 123* 110* 101*   BUN 18 17 16   CREATININE 0.68 0.78 0.81   CALCIUM 9.0 9.2 9.2                   Imaging  DX-CHEST-PORTABLE (1 VIEW)   Final Result      1.  Lower lung volumes and increased atelectasis following extubation   2.  New patchy opacity in the LEFT mid lung could be atelectasis or early pneumonia      DX-CHEST-PORTABLE (1 VIEW)   Final Result      1.  Line and tube position as described above. No visible pneumothorax.   2.  LEFT basilar atelectasis or pneumonia      MR-BRAIN-WITH & W/O   Final Result      1.  Approximately 40 mm acute parenchymal hemorrhage at the left posterior frontal convexity. No abnormal vascular flow voids or underlying enhancing mass lesion.   2.  Encephalomalacic change in the left frontal deep white matter along the corona radiata and far posterior aspect of the left head of caudate nucleus consistent with old infarction.   3.  Moderate supratentorial white matter disease most consistent with microvascular ischemic change.   4.  Incidental 9 mm meningioma at the floor of the  left middle cranial fossa.   5.  Old lacunar size infarct in the left paramedian hans.   6.  Mild-moderate pontine ischemic gliosis.      EC-ECHOCARDIOGRAM COMPLETE W/O CONT   Final Result      DX-ABDOMEN FOR TUBE PLACEMENT   Final Result      Feeding tube extends below the diaphragm with tip at the level of the proximal duodenum.      DX-CHEST-LIMITED (1 VIEW)   Final Result         1.  Line and tube placements are noted as described above.      2.  No pneumothorax.      3.  Mild perihilar congestion.      4.  No consolidations identified.      CT-HEAD W/O   Final Result         1.  No significant change in left-sided parenchymal hemorrhage since prior CT.      2.  Minimal midline shift to the right side.      3.  No new intracranial hemorrhage is identified.         OUTSIDE IMAGES-CT HEAD   Final Result      CT-HEAD W/O    (Results Pending)        Assessment/Plan  * Nontraumatic cortical hemorrhage of left cerebral hemisphere (HCC)- (present on admission)  Assessment & Plan  Hypertensive bleed in the setting of untreated hypertension and methampetamine use  Non-operative management with consultation by Dr. Chanel  She has right arm deficit and expressive aphasia with dysphagia.   cortrak feeding  Therapies  Rehab medicine consulted,  Patient refused inpatient rehab  Ordered home health  Blood pressure control  Illicit drug counseling      Headache  Assessment & Plan  Persistent.  Repeat CT head without contrast 8/30, pending  Optimize blood pressure control  Symptomatic treatment    Hypokalemia  Assessment & Plan  Replaced    Dysphagia  Assessment & Plan  Status post FEES  Started on dysphagia diet, core track removed 8/28    Tobacco abuse- (present on admission)  Assessment & Plan  Cessation discussions  Nicotine patch    Methamphetamine abuse (HCC)- (present on admission)  Assessment & Plan  tox screen is +      Chronic kidney disease- (present on admission)  Assessment & Plan  History of    Hypertensive  emergency- (present on admission)  Assessment & Plan  She was started on norvasc and metoprolol  Her BP has remained quite elevated thus change metoprolol to labetalol 200 mg BID  IV prns available  8/28 blood pressure is still not well controlled.  We will add lisinopril.  Titrate up as needed  8/29 increase dose of lisinopril to 10 mg once a day, continue amlodipine, labetalol  8/30 increase dose of lisinopril to 20 mg once a day    Acute respiratory failure with hypoxia (HCC)- (present on admission)  Assessment & Plan  She was intubated 8/25/2021 through 8/26/2021 due to airway protection in the setting of intracranial hemorrhage  Wean off oxygen as able       VTE prophylaxis: pharmacologic prophylaxis contraindicated due to Intracranial bleed    I have performed a physical exam and reviewed and updated ROS and Plan today (8/30/2021). In review of yesterday's note (8/29/2021), there are no changes except as documented above.

## 2021-08-30 NOTE — THERAPY
"Occupational Therapy  Daily Treatment     Patient Name: Heide Rangel  Age:  51 y.o., Sex:  female  Medical Record #: [id number]  Today's Date: 8/30/2021     Precautions  Precautions: (P) Fall Risk, Swallow Precautions ( See Comments)  Comments: BP goal <140/90 2' to recent [facility]    Assessment    Pt seen for OT tx session. Pt demo'd standing grooming and toileting, and toilet txfs w/ min A. Pt's R UE w/ delay for FMC and ADLs. Provided pt w/ red therablock and theraputty w/ HEP. Will continue to follow while in house.     Plan    Continue current treatment plan.    DC Equipment Recommendations: (P) Unable to determine at this time  Discharge Recommendations: (P) Recommend post-acute placement for additional occupational therapy services prior to discharge home    Subjective    \"My kids and I have matching tattoos\"     Objective       08/30/21 0855   Precautions   Precautions Fall Risk;Swallow Precautions ( See Comments)   Vitals   O2 (LPM) 2   O2 Delivery Device Silicone Nasal Cannula   Cognition    Cognition / Consciousness X   Speech/ Communication Expressive Aphasia;Nods Appropriately   Level of Consciousness Alert   Safety Awareness Impaired;Impulsive   Comments very pleasent, cooperative, motivated, eager to regain independence, self directed at times   Active ROM Upper Body   Active ROM Upper Body  WDL   Hand Strengthening   Comment red therablock and theraputty w/ beads   Balance   Sitting Balance (Static) Fair +   Sitting Balance (Dynamic) Fair   Standing Balance (Static) Fair -   Standing Balance (Dynamic) Poor +   Weight Shift Sitting Fair   Weight Shift Standing Fair   Skilled Intervention Tactile Cuing;Verbal Cuing;Postural Facilitation   Comments refused FWW, attempted to furniture walk   Bed Mobility    Supine to Sit Supervised   Sit to Supine   (NT in chair post)   Scooting Minimal Assist   Activities of Daily Living   Grooming Minimal Assist;Standing  (brushed hair, washed face and hands)   Upper Body " Dressing Supervision   Lower Body Dressing Supervision   Toileting Minimal Assist   Skilled Intervention Verbal Cuing;Tactile Cuing   How much help from another person does the patient currently need...   Putting on and taking off regular lower body clothing? 4   Bathing (including washing, rinsing, and drying)? 3   Toileting, which includes using a toilet, bedpan, or urinal? 3   Putting on and taking off regular upper body clothing? 4   Taking care of personal grooming such as brushing teeth? 4   Eating meals? 4   6 Clicks Daily Activity Score 22   Functional Mobility   Sit to Stand Supervised   Bed, Chair, Wheelchair Transfer Minimal Assist   Toilet Transfers Minimal Assist   Transfer Method Stand Step   Mobility within room w/ HHA   Activity Tolerance   Sitting in Chair 10+ min (up post)   Sitting Edge of Bed 10 min   Standing 8 min   Comments no c/o fatigue   Patient / Family Goals   Patient / Family Goal #1 To be independent   Goal #1 Outcome Goal not met   Short Term Goals   Short Term Goal # 1 Pt will complete toilet transfer with spv by discharge.   Goal Outcome # 1 Progressing as expected   Short Term Goal # 2 Pt will complete seated grooming/hygiene with spv by discharge.   Goal Outcome # 2 Progressing as expected   Short Term Goal # 3 Pt will complete toileting with spv by discharge.   Goal Outcome # 3 Progressing as expected   Short Term Goal # 4 Pt will increase RUE gross strength to WFL for improved independence with ADLs by discharge.   Goal Outcome # 4 Progressing as expected   Education Group   Role of Occupational Therapist Patient Response Patient;Acceptance;Explanation;Demonstration;Verbal Demonstration   UE Strengthening Patient Response Patient;Acceptance;Explanation;Demonstration;Verbal Demonstration   ADL Patient Response Patient;Acceptance;Explanation;Demonstration;Verbal Demonstration;Action Demonstration   Anticipated Discharge Equipment and Recommendations   DC Equipment Recommendations  Unable to determine at this time   Discharge Recommendations Recommend post-acute placement for additional occupational therapy services prior to discharge home   Interdisciplinary Plan of Care Collaboration   IDT Collaboration with  Nursing   Patient Position at End of Therapy Call Light within Reach;Tray Table within Reach;Phone within Reach;Seated;Chair Alarm On   Collaboration Comments report given   Session Information   Date / Session Number  8/30, 2 (2/5, 9/2)   Priority 3

## 2021-08-30 NOTE — THERAPY
Speech Language Pathology   Initial Assessment     Patient Name: Heide Rangel  AGE:  51 y.o., SEX:  female  Medical Record #: [id number]  Today's Date: 8/30/2021     Precautions  Precautions: Fall Risk, Swallow Precautions ( See Comments)  Comments: BP goal <140/90 2' to recent IPH    Assessment    Patient is 51 y.o. female admitted 8/25/21 for ICH and respiratory failure. Past medical history significant for hypertension, chronic kidney disease, and drug use.      Intubated 8/25 - 8/26.      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      CXR: Lower lung volumes and increased atelectasis following extubation. New patchy opacity in the LEFT mid lung could be atelectasis or early pneumonia.      CTH 8/30: Stable left frontal intraparenchymal hematoma. No evidence of new acute intracranial hemorrhage.    Evaluation of aphasia was completed using formal and informal assessment measures, including portions of the Western Aphasia Battery -Revised Bedside (WAB-R). Pt presents with a moderate expressive aphasia, impacting both verbal and written expression modalities. Verbal expression is also negatively impacted by dysarthria and ?verbal apraxia, as evidenced by imprecise articulation, and inconsistent phonemic substitution and omission errors with improvement. Pt was stimulable for rhythmic tapping to aid clearer prroduction of multisyllabic words. Pt had difficulty writing her name and address, needing mod-max cues. A letter board was minimally effective for pt to clarify her responses. Auditory comprehension and reading comprehension were relative strengths with pt able to follow complex multistep commands and read at the sentence level.     Recommend 24/7 supervision at home and in the community due to severity of expressive communication deficits. Pt would benefit from intensive SLP intervention to address the aforementioned deficits. Pt did express  "that she wished to return home instead of rehab and eventually agreed to 24/7 care from family, following education.    Plan    Recommend Speech Therapy 5 times per week until therapy goals are met for the following treatments:  Expression Training, Writing Training and Patient / Family / Caregiver Education.    Discharge Recommendations: Recommend post-acute placement for additional speech therapy services prior to discharge home (or 24/7 care if going home)     Objective       08/30/21 1230   Verbal Expression   Verbal Output Automatic Minimal (4)   Verbal Output: Phrases Minimal (4)   Verbal Output Conversation Moderate (3)   Verbal Output Functional Minimal (4)   Repetition: Single Words Within Functional Limits (6-7)   Repetition: Phrases Minimal (4)   Repetition: Sentences Severe (2)   Naming Within Functional Limits (6-7)   Dysarthria Moderate (3)   Apraxia: Oral Minimal (4)   Apraxia: Verbal Moderate (3)   Word Finding Deficits Moderate (3)   Paraphasias Moderate (3)   Auditory Comprehension   Auditory Comprehension (WDL) WDL   Reading Comprehension   Reading Comprehension (WDL) WDL   Written Expression   Ability To Copy: Simple Form Minimal (4)   Functional Writing: Name Moderate (3)   Cognitive-Linguistic   Level of Consciousness Alert   Orientation Level Oriented x 4   Social / Pragmatic Communication   Social / Pragmatic Communication WDL   Aphasia Compensatory Strategies   Compensatory Strategies Used To Communicate Yes / No Responses;Reading Written Words;Repetition / Imitation;Multi Modality Cues;Other (Comments)  (letter board)   Outcome Measures   Outcome Measures Utilized WAB-Bedside  (partially administered)   WAB - Bedside (Western Aphasia Battery)   Spontaneous Speech: Fluency 5   Auditory Comprehension 10   Sequential Commands 10   Repetition Score  6  (5.5)   Object Naming 10   Apraxia  9   Patient / Family Goals   Patient / Family Goal #1 \"OK\" to ice chips   Goal #1 Outcome Goal met   Short " Term Goals   Short Term Goal # 2 Patient will communicate at the sentence level given min-mod cues and strategies.   Short Term Goal # 3 Patient will write at the word level with >80% accuracy given mod-max cues.

## 2021-08-30 NOTE — FACE TO FACE
Face to Face Supporting Documentation - Home Health    The encounter with this patient was in whole or in part the primary reason for home health admission.    Date of encounter:   Patient:                    MRN:                       YOB: 2020  Heide Rangel  [id number]  1970     Home health to see patient for:  Physical Therapy evaluation and treatment, Occupational therapy evaluation and treatment and Speech Language Pathology evaluation and treatment    Skilled need for:  Recent Deterioration of Health Status Intracranial bleed    Skilled nursing interventions to include:  Comment: PT OT and speech therapy    Homebound status evidenced by:  Needs the assistance of another person in order to leave the home. Leaving home requires a considerable and taxing effort. There is a normal inability to leave the home.    Community Physician to provide follow up care: No primary care provider on file.     Optional Interventions? No      I certify the face to face encounter for this home health care referral meets the CMS requirements and the encounter/clinical assessment with the patient was, in whole, or in part, for the medical condition(s) listed above, which is the primary reason for home health care. Based on my clinical findings: the service(s) are medically necessary, support the need for home health care, and the homebound criteria are met.  I certify that this patient has had a face to face encounter by myself.  Joseph Carrillo M.D. - NPI: [id number]

## 2021-08-30 NOTE — DISCHARGE PLANNING
Anticipated Discharge Disposition:   Homehealth    Action:   Noted from Rafael's note that daughter and pt declined Acute Rehab.     Dr. Carrillo has placed a HH order.     CM called daughter Deya. She confirmed that pt does not want to go to [facility] instead prefers HH. Choice is for [facility]. Agustín is not sure if pt has a PCP so email sent to [facility] Stanislaw to assist with PCP. Choice faxed to [facility].     Barriers to Discharge:   Pending HH acceptance.     Plan:   Follow up with [facility].

## 2021-08-30 NOTE — DISCHARGE PLANNING
Received Choice form at 1356  Agency/Facility Name: [facility]  Referral sent per Choice form @ 1408     [facility]: [facility]   Outcome: [facility]  declined pt per epic    1558  Received Choice form at 1518  Agency/[facility] Name: [facility]  Referral sent per Choice form @ 1717

## 2021-08-30 NOTE — DISCHARGE PLANNING
Reason for referral denial: Unable to accept today due to insurance capacity - We are rapidly assessing our census for discharge opportunities.  At this time we are only able to accept referrals with SCP insurance.               Unfortunately, we are not able to accept this referral for the reason listed above. If further clarity is needed, our Transitional Care Specialists are available to discuss any barriers to service at [phone].      We look forward to collaborating with you in the future,  [facility] Team

## 2021-08-30 NOTE — ASSESSMENT & PLAN NOTE
Persistent.  Repeat CT head without contrast 8/30, pending  Optimize blood pressure control  Symptomatic treatment

## 2021-08-30 NOTE — DISCHARGE PLANNING
Declined by [facility].   [facility] sent for [facility] [facility]. Daughter Deya gave her consent to send the referral. Daughter still searching for name of PCP. EMail sent to Alissa Salazar ([facility] sup) for PCP assistance.

## 2021-08-30 NOTE — RESPIRATORY CARE
"COPD EDUCATION by COPD CLINICAL EDUCATOR  8/30/2021  at  4:40 PM by Felipa Hancock RRT     Patient interviewed by COPD education team.  Patient unable to participate in full program.  A short intervention has been conducted.  A comprehensive packet including information about COPD, types of treatments to manage their disease and safe home Oxygen usage was provided and reviewed with patient at the bedside.       Smoking Cessation Intervention and education completed, 10 minutes spent on smoking cessation education with patient.    Provided smoking cessation packet with \"Tips to Quit\" and brochure for \"Free Smoking Cessation Classes\".     COPD Screen  COPD Risk Screening  Do you have a history of COPD?: Yes  Do you have a Pulmonologist?: No  COPD Population Screener  During the past 4 weeks, how much did you feel short of breath?: Most  or all of the time  How old are you?: 50-59    COPD Assessment  COPD Clinical Specialists ONLY  COPD Education Initiated: Yes--Short Intervention (Pt being discharged, given COPD and Smoking Cessation booklets, spacer, and Albuterol MDI ordered)  DME Company: n/a  DME Equipment Type: none  Physician Name:  (Pt advised to set up apt at [facility])  Referrals Initiated: Yes  Pulmonary Rehab: N/A  Smoking Cessation: Yes  $ Smoking Cessation 3-10 Minutes: Symptomatic (10 min)  Palliative Care:  (declined)  Hospice: N/A  Home Health Care: Yes (referral placed by MD)  [facility] Outreach: Declined  [facility]: N/A  [facility]: N/A  Private In-Home Care Agency: N/A  Is this a COPD exacerbation patient?: No  $ Demo/Eval of SVN's, MDI's and Aerosols: Yes (spacer given and Albuterol MDI ordered via Meds to Beds)    Meds to Beds  Would the patient like to opt in for Bedside Medication Delivery at Discharge?: Yes, interested     MY COPD ACTION PLAN     It is recommended that patients and physicians /healthcare providers complete this action plan together. This " "plan should be discussed at each physician visit and updated as needed.    The green, yellow and red zones show groups of symptoms of COPD. This list of symptoms is not comprehensive, and you may experience other symptoms. In the \"Actions\" column, your healthcare provider has recommended actions for you to take based on your symptoms.    Patient Name: Heide Rangel   YOB: 1970   Last Updated on:     Green Zone:  I am doing well today Actions   •  Usual activitiy and exercise level •  Take daily medications   •  Usual amounts of cough and phlegm/mucus •  Use oxygen as prescribed   •  Sleep well at night •  Continue regular exercise/diet plan   •  Appetite is good •  At all times avoid cigarette smoke, inhaled irritants     Daily Medications (these medications are taken every day):                Yellow Zone:  I am having a bad day or a COPD flare Actions   •  More breathless than usual •  Continue daily medications   •  I have less energy for my daily activities •  Use quick relief inhaler as ordered   •  Increased or thicker phlegm/mucus •  Use oxygen as prescribed   •  Using quick relief inhaler/nebulizer more often •  Get plenty of rest   •  Swelling of ankles more than usual •  Use pursed lip breathing   •  More coughing than usual •  At all times avoid cigarette smoke, inhaled irritants   •  I feel like I have a \"chest cold\"   •  Poor sleep and my symptoms woke me up   •  My appetite is not good   •  My medicine is not helping    •  Call provider immediately if symptoms don’t improve     Continue daily medications, add rescue medications:   Albuterol 2 Puffs Every 4 hours PRN       Medications to be used during a flare up, (as Discussed with Provider):           Additional Information:  Use with the spacer    Red Zone:  I need urgent medical care Actions   •  Severe shortness of breath even at rest •  Call 911 or seek medical care immediately   •  Not able to do any activity because of breathing  "   •  Fever or shaking chills    •  Feeling confused or very drowsy     •  Chest pains    •  Coughing up blood

## 2021-08-30 NOTE — DISCHARGE PLANNING
Discussed with CN and she stated that pt has outpt order for Speech and OT. Response from [facility] is pending regarding PCP.

## 2021-08-30 NOTE — PROGRESS NOTES
Monitor summary: SR 71-83, NM .19, QRS .08, QT .40 with PVC's per strip from monitor room.

## 2021-08-31 NOTE — PROGRESS NOTES
Monitor summary: SR with PACs and PVCs 71-79, WA 0.19, QRS 0.07, QT 0.38, per strip from monitor room.

## 2021-08-31 NOTE — DISCHARGE INSTRUCTIONS
outpatient OT and speech therapy F/u with pcp F/u neurology bridge clinic F/u with Dr Chanel with repeat MRI   Discharge Instructions    Discharged to home by car with relative. Discharged via wheelchair, hospital escort: Yes.  Special equipment needed: Not Applicable    Be sure to schedule a follow-up appointment with your primary care doctor or any specialists as instructed.     Discharge Plan:   Diet Plan: Discussed  Activity Level: Discussed  Confirmed Follow up Appointment: Patient to Call and Schedule Appointment  Confirmed Symptoms Management: Discussed  Medication Reconciliation Updated: Yes    I understand that a diet low in cholesterol, fat, and sodium is recommended for good health. Unless I have been given specific instructions below for another diet, I accept this instruction as my diet prescription.   Other diet: soft bite sized, thin liquids    Special Instructions:     Stroke/CVA/TIA/Hemorrhagic Ischemia Discharge Instructions  You have had a stroke. Your risk factors have been identified as follows:  High blood pressure  Smoking  Alcohol or drug abuse  It is important that you reduce your risk factors to avoid another stroke in the future. Here are some general guidelines to follow:  · Eat healthy - avoid food high in fat.  · Get regular exercise.  · Maintain a healthy weight.  · Avoid smoking.  · Avoid alcohol and illegal drug use.  · Take your medications as directed.  For more information regarding risk factors, refer to pages 17-19 in your Stroke Patient Education Guide. Stroke Education Guide was given to patient and family member.    Warning signs of a stroke include (which can also be found on page 3 of your Stroke Patient Education Guide):  · Sudden numbness of weakness of the face, arm or leg (especially on one side of the body).  · Sudden confusion, trouble speaking or understanding.  · Sudden trouble seeing in one or both eyes.  · Sudden trouble walking, dizziness, loss of balance or  coordination.  · Sudden severe headache with no known cause.  It is very important to get treatment quickly when a stroke occurs. If you experience any of the above warning signs, call [phone] immediately.     Some patients who have had a stroke will be going home on a blood thinner medication called Warfarin (Coumadin).  This medication requires very close monitoring and follow up.  This follow up can be provided by either your Primary Care Physician or by [facility] Outpatient Anticoagulation Service.  The Outpatient Anticoagulation Service is located at the [facility] at [facility] ([facility]).  If you do not know when your follow up appointment is scheduled, call [phone] to verify your appointment time.      · Is patient discharged on Warfarin / Coumadin?   No     Depression / Suicide Risk    As you are discharged from this [facility], it is important to learn how to keep safe from harming yourself.    Recognize the warning signs:  · Abrupt changes in personality, positive or negative- including increase in energy   · Giving away possessions  · Change in eating patterns- significant weight changes-  positive or negative  · Change in sleeping patterns- unable to sleep or sleeping all the time   · Unwillingness or inability to communicate  · Depression  · Unusual sadness, discouragement and loneliness  · Talk of wanting to die  · Neglect of personal appearance   · Rebelliousness- reckless behavior  · Withdrawal from people/activities they love  · Confusion- inability to concentrate     If you or a loved one observes any of these behaviors or has concerns about self-harm, here's what you can do:  · Talk about it- your feelings and reasons for harming yourself  · Remove any means that you might use to hurt yourself (examples: pills, rope, extension cords, firearm)  · Get professional help from the community (Mental Health, Substance Abuse,  psychological counseling)  · Do not be alone:Call your Safe Contact- someone whom you trust who will be there for you.  · Call your local CRISIS HOTLINE 129-7382 or 071-804-3542  · Call your local Children's Mobile Crisis Response Team [place] (957) 284-5612 or www.yetu  · Call the toll free National Suicide Prevention Hotlines   · National Suicide Prevention Lifeline 965-766-WKQV (8960)  · [employer] Hope [employer] Network 800-SUICIDE (845-9330)      Hemorrhagic Stroke    A hemorrhagic stroke is the sudden death of brain tissue that occurs when a blood vessel in the brain leaks or bursts (ruptures), causing bleeding in or around the brain(hemorrhage). When this happens, areas of the brain do not get enough oxygen, and blood builds up and presses on areas of the brain. Lack of oxygen and pressure from hemorrhaging can lead to brain damage and death.  There are two major types of hemorrhagic stroke:  · Intracerebral hemorrhage. This happens if bleeding occurs within the brain tissue.  · Subarachnoid hemorrhage. This happens if bleeding occurs in the area between the brain and the membrane that covers the brain (subarachnoid space).  Hemorrhagic stroke is a medical emergency. It can cause temporary or permanent brain damage and loss of brain function.  What are the causes?  This condition is caused by a blood vessel leaking or rupturing, which may be the result of:  · Part of a weakened blood vessel wall bulging or ballooning out (cerebral aneurysm).  · A hardened, thin blood vessel cracking open and allowing blood to leak out. Blood vessels may become hardened and thin due to plaque buildup.  · Tangled blood vessels in the brain (brain arteriovenous malformation).  · Protein buildup in artery walls in the brain (amyloid angiopathy).  · Inflamed blood vessels (vasculitis).  · A tumor in the brain.  · High blood pressure (hypertension).  What increases the risk?  The following factors may make you more  likely to develop this condition:  · Hypertension.  · Having abnormal blood vessels present since birth (congenital abnormality).  · Bleeding disorders, such as hemophilia, sickle cell disease, or liver disease.  · The blood becoming too thin while taking blood thinners (anticoagulants).  · Aging.  · Moderate or heavy alcohol use.  · Using drugs, such as cocaine or methamphetamines.  What are the signs or symptoms?  Symptoms of this condition usually appear suddenly, and may include:  · Weakness or numbness of the face, arm, or leg, especially on one side of the body.  · Confusion.  · Difficulty speaking (aphasia) or understanding speech.  · Difficulty seeing out of one or both eyes.  · Difficulty walking or moving the arms or legs.  · Dizziness.  · Loss of balance or coordination.  · Seizures.  · A severe headache with no known cause. This headache may feel like the worst headache a person has ever experienced.  How is this diagnosed?  This condition may be diagnosed based on:  · Your symptoms.  · Your medical history.  · A physical exam.  · Tests, including:  ? Blood tests.  ? CT scan.  ? MRI.  ? CT angiography (CTA) or magnetic resonance angiography (MRA).  · Catheter angiogram. In this procedure, dye is injected through a long, thin tube (catheter) into one of your arteries. Then, X-rays are taken. The X-rays will show whether there is a blockage or a problem in a blood vessel.  How is this treated?  This condition is a medical emergency that must be treated in a hospital immediately. The goals of treatment are to stop bleeding, reduce pressure on the brain, relieve symptoms, and prevent complications. Treatment for this condition may include:  · Medicines that:  ? Lower blood pressure (antihypertensives).  ? Relieve pain (analgesics).  ? Relieve nausea or vomiting.  ? Stop or prevent seizures (anticonvulsants).  ? Relieve fever.  ? Prevent blood vessels in the brain from spasming in response to  bleeding.  ? Control bleeding in the brain.  · Assisted breathing (ventilation). This involves using a machine to help you breathe (ventilator).  · Receiving donated blood products through an IV (transfusion). You will receive cells that help your blood clot.  · Placement of a tube (shunt) in the brain to relieve pressure.  · Physical, speech, or occupational therapy.  · Surgery to stop bleeding, remove a blood clot or tumor, or reduce pressure.  Treatment depends on the cause, severity, and duration of symptoms. Medicines and changes to your diet may be used to help treat and manage risk factors for stroke, such as diabetes and high blood pressure. Recovery from hemorrhagic stroke varies widely. Talk with your health care provider about what to expect during your recovery.  Follow these instructions at home:  Activity  · Use a walker or a cane as told by your health care provider.  · Return to your normal activities as told by your health care provider. Ask your health care provider what activities are safe for you.  · Rest. Rest helps the brain to heal. Make sure you:  ? Get plenty of sleep. Avoid staying up late at night.  ? Keep a consistent sleep schedule. Try to go to sleep and wake up at about the same time every day.  ? Avoid activities that cause physical or mental stress.  Lifestyle  · Do not drink alcohol if:  ? Your health care provider tells you not to drink.  ? You are pregnant, may be pregnant, or are planning to become pregnant.  · If you drink alcohol:  ? Limit how much you use to:  § 0-1 drink a day for women.  § 0-2 drinks a day for men.  ? Be aware of how much alcohol is in your drink. In the [place] one drink equals one 12 oz bottle of beer (355 mL), one 5 oz glass of wine (148 mL), or one 1½ oz glass of hard liquor (44 mL).  General instructions  · Do not drive or use heavy machinery until your health care provider approves.  · Take over-the-counter and prescription medicines only as told by your  "health care provider.  · Keep all follow-up visits as told by your health care provider, including visits with therapists. This is important.  How is this prevented?  Your risk of stroke can be decreased by working with your health care provider to treat:  · High blood pressure.  · High cholesterol.  · Diabetes.  · Heart disease.  · Obesity.  Your risk of stroke can also be decreased by quitting smoking, limiting alcohol, and staying physically active. If you take the blood thinner warfarin, have your bloodwork monitored frequently by your health care provider.  Contact a health care provider if:  You develop any of the following symptoms:  · Headaches that keep coming back (chronic headaches).  · Nausea.  · Vision problems.  · Increased sensitivity to noise or light.  · Depression or mood swings.  · Anxiety or irritability.  · Memory problems.  · Difficulty concentrating or paying attention.  · Sleep problems.  · Feeling tired all of the time.  Get help right away if you:  · Have a partial or total loss of consciousness.  · Are taking blood thinners and you fall or you experience minor injury to the head.  · Have a bleeding disorder and you fall or you experience minor trauma to the head.  · Have any symptoms of a stroke. \"BE FAST\" is an easy way to remember the main warning signs of a stroke:  ? B - Balance. Signs are dizziness, sudden trouble walking, or loss of balance.  ? E - Eyes. Signs are trouble seeing or a sudden change in vision.  ? F - Face. Signs are sudden weakness or numbness of the face, or the face or eyelid drooping on one side.  ? A - Arms. Signs are weakness or numbness in an arm. This happens suddenly and usually on one side of the body.  ? S - Speech. Signs are sudden trouble speaking, slurred speech, or trouble understanding what people say.  ? T - Time. Time to call emergency services. Write down what time symptoms started.  · Have other signs of a stroke, such as:  ? A sudden, severe " headache with no known cause.  ? Nausea or vomiting.  ? Seizure.  These symptoms may represent a serious problem that is an emergency. Do not wait to see if the symptoms will go away. Get medical help right away. Call your local emergency services (911 in the [place] Do not drive yourself to the hospital.  Summary  · Hemorrhagic stroke is a medical emergency.  · Hemorrhagic stroke is caused by bleeding in or around the brain.  · Know the signs and symptoms of stroke.  · Know your stroke risk factors. Work with your health care provider to decrease your risk of stroke.  This information is not intended to replace advice given to you by your health care provider. Make sure you discuss any questions you have with your health care provider.  Document Released: 06/07/2011 Document Revised: 01/23/2020 Document Reviewed: 01/24/2020  Elsevier Patient Education © 2020 [employer]      Hypertension, Adult  Hypertension is another name for high blood pressure. High blood pressure forces your heart to work harder to pump blood. This can cause problems over time.  There are two numbers in a blood pressure reading. There is a top number (systolic) over a bottom number (diastolic). It is best to have a blood pressure that is below 120/80. Healthy choices can help lower your blood pressure, or you may need medicine to help lower it.  What are the causes?  The cause of this condition is not known. Some conditions may be related to high blood pressure.  What increases the risk?  · Smoking.  · Having type 2 diabetes mellitus, high cholesterol, or both.  · Not getting enough exercise or physical activity.  · Being overweight.  · Having too much fat, sugar, calories, or salt (sodium) in your diet.  · Drinking too much alcohol.  · Having long-term (chronic) kidney disease.  · Having a family history of high blood pressure.  · Age. Risk increases with age.  · Race. You may be at higher risk if you are .  · Gender. Men are  at higher risk than women before age 45. After age 65, women are at higher risk than men.  · Having obstructive sleep apnea.  · Stress.  What are the signs or symptoms?  · High blood pressure may not cause symptoms. Very high blood pressure (hypertensive crisis) may cause:  ? Headache.  ? Feelings of worry or nervousness (anxiety).  ? Shortness of breath.  ? Nosebleed.  ? A feeling of being sick to your stomach (nausea).  ? Throwing up (vomiting).  ? Changes in how you see.  ? Very bad chest pain.  ? Seizures.  How is this treated?  · This condition is treated by making healthy lifestyle changes, such as:  ? Eating healthy foods.  ? Exercising more.  ? Drinking less alcohol.  · Your health care provider may prescribe medicine if lifestyle changes are not enough to get your blood pressure under control, and if:  ? Your top number is above 130.  ? Your bottom number is above 80.  · Your personal target blood pressure may vary.  Follow these instructions at home:  Eating and drinking    · If told, follow the DASH eating plan. To follow this plan:  ? Fill one half of your plate at each meal with fruits and vegetables.  ? Fill one fourth of your plate at each meal with whole grains. Whole grains include whole-wheat pasta, brown rice, and whole-grain bread.  ? Eat or drink low-fat dairy products, such as skim milk or low-fat yogurt.  ? Fill one fourth of your plate at each meal with low-fat (lean) proteins. Low-fat proteins include fish, chicken without skin, eggs, beans, and tofu.  ? Avoid fatty meat, cured and processed meat, or chicken with skin.  ? Avoid pre-made or processed food.  · Eat less than 1,500 mg of salt each day.  · Do not drink alcohol if:  ? Your doctor tells you not to drink.  ? You are pregnant, may be pregnant, or are planning to become pregnant.  · If you drink alcohol:  ? Limit how much you use to:  § 0-1 drink a day for women.  § 0-2 drinks a day for men.  ? Be aware of how much alcohol is in your  drink. In the [place] one drink equals one 12 oz bottle of beer (355 mL), one 5 oz glass of wine (148 mL), or one 1½ oz glass of hard liquor (44 mL).  Lifestyle    · Work with your doctor to stay at a healthy weight or to lose weight. Ask your doctor what the best weight is for you.  · Get at least 30 minutes of exercise most days of the week. This may include walking, swimming, or biking.  · Get at least 30 minutes of exercise that strengthens your muscles (resistance exercise) at least 3 days a week. This may include lifting weights or doing Pilates.  · Do not use any products that contain nicotine or tobacco, such as cigarettes, e-cigarettes, and chewing tobacco. If you need help quitting, ask your doctor.  · Check your blood pressure at home as told by your doctor.  · Keep all follow-up visits as told by your doctor. This is important.  Medicines  · Take over-the-counter and prescription medicines only as told by your doctor. Follow directions carefully.  · Do not skip doses of blood pressure medicine. The medicine does not work as well if you skip doses. Skipping doses also puts you at risk for problems.  · Ask your doctor about side effects or reactions to medicines that you should watch for.  Contact a doctor if you:  · Think you are having a reaction to the medicine you are taking.  · Have headaches that keep coming back (recurring).  · Feel dizzy.  · Have swelling in your ankles.  · Have trouble with your vision.  Get help right away if you:  · Get a very bad headache.  · Start to feel mixed up (confused).  · Feel weak or numb.  · Feel faint.  · Have very bad pain in your:  ? Chest.  ? Belly (abdomen).  · Throw up more than once.  · Have trouble breathing.  Summary  · Hypertension is another name for high blood pressure.  · High blood pressure forces your heart to work harder to pump blood.  · For most people, a normal blood pressure is less than 120/80.  · Making healthy choices can help lower blood  pressure. If your blood pressure does not get lower with healthy choices, you may need to take medicine.  This information is not intended to replace advice given to you by your health care provider. Make sure you discuss any questions you have with your health care provider.  Document Released: 06/05/2009 Document Revised: 08/28/2019 Document Reviewed: 08/28/2019  Elsevier Patient Education © 2020 [employer]

## 2021-08-31 NOTE — DISCHARGE PLANNING
[facility] Name: [facility]  Spoke To: Ann  Outcome: Pt declined, They are unable to take any medicaid pts at this time as their schedule is full      DPA sent referral to [facility] @ [phone]  [facility]  Spoke To: Jacy  Outcome: Do not take medicaid. Pt declined    DPA sent referral to [facility] @ [phone]

## 2021-08-31 NOTE — PROGRESS NOTES
"Expressed concerns of diet, mobility, safety, oxygen, and BP with doctor regarding discharge for this patient. Doctor stated\" we cannot hold this patient for non medical reasons\". Excessive education provided to patient and family.  "

## 2021-08-31 NOTE — PROGRESS NOTES
Patient discharged to home with family. PIV removed. Dressing appied. Education provided to patient and family. Verbalization of understanding. All belongings with patient at discharge.

## 2021-09-07 NOTE — DISCHARGE PLANNING
Action:  RN CM spoke with patient's daughter, Deya via telephone today.  She stated that patient did not [facility]. Patient was seen at [facility] on 9-3-21 and they have initiated a referral for outpatient therapy which may take two weeks.  RN CM telephone number provided for any further questions or assistance.

## 2021-09-15 ENCOUNTER — TELEPHONE (OUTPATIENT)
Dept: CARDIOLOGY | Facility: MEDICAL CENTER | Age: 51
End: 2021-09-15

## 2021-09-15 NOTE — TELEPHONE ENCOUNTER
Called pt in regards to obtaining records for NP appointment with [facility]. Patient's daughter stated she believes patient has been treated by a previous cardiologist years ago, cardiologist unknown. Recent notes, labs, and cardiac imaging are in Epic.

## 2021-09-20 ENCOUNTER — OFFICE VISIT (OUTPATIENT)
Dept: CARDIOLOGY | Facility: MEDICAL CENTER | Age: 51
End: 2021-09-20
Payer: MEDICAID

## 2021-09-20 VITALS
DIASTOLIC BLOOD PRESSURE: 90 MMHG | BODY MASS INDEX: 29.73 KG/M2 | RESPIRATION RATE: 14 BRPM | SYSTOLIC BLOOD PRESSURE: 162 MMHG | WEIGHT: 185 LBS | HEIGHT: 66 IN | OXYGEN SATURATION: 96 % | HEART RATE: 71 BPM

## 2021-09-20 DIAGNOSIS — F15.10 METHAMPHETAMINE ABUSE (HCC): ICD-10-CM

## 2021-09-20 DIAGNOSIS — N18.9 CHRONIC KIDNEY DISEASE, UNSPECIFIED CKD STAGE: ICD-10-CM

## 2021-09-20 DIAGNOSIS — I10 HYPERTENSION, UNSPECIFIED TYPE: ICD-10-CM

## 2021-09-20 DIAGNOSIS — Z72.0 TOBACCO ABUSE: ICD-10-CM

## 2021-09-20 DIAGNOSIS — I61.1 NONTRAUMATIC CORTICAL HEMORRHAGE OF LEFT CEREBRAL HEMISPHERE (HCC): ICD-10-CM

## 2021-09-20 DIAGNOSIS — I10 ESSENTIAL HYPERTENSION: ICD-10-CM

## 2021-09-20 DIAGNOSIS — E78.49 OTHER HYPERLIPIDEMIA: ICD-10-CM

## 2021-09-20 PROBLEM — J81.0 ACUTE PULMONARY EDEMA (HCC): Status: RESOLVED | Noted: 2021-08-25 | Resolved: 2021-09-20

## 2021-09-20 PROBLEM — E87.6 HYPOKALEMIA: Status: RESOLVED | Noted: 2021-08-28 | Resolved: 2021-09-20

## 2021-09-20 PROBLEM — I16.1 HYPERTENSIVE EMERGENCY: Status: RESOLVED | Noted: 2021-08-25 | Resolved: 2021-09-20

## 2021-09-20 PROBLEM — J96.01 ACUTE RESPIRATORY FAILURE WITH HYPOXIA (HCC): Status: RESOLVED | Noted: 2021-08-25 | Resolved: 2021-09-20

## 2021-09-20 LAB — EKG IMPRESSION: NORMAL

## 2021-09-20 PROCEDURE — 99204 OFFICE O/P NEW MOD 45 MIN: CPT | Performed by: NURSE PRACTITIONER

## 2021-09-20 PROCEDURE — 93000 ELECTROCARDIOGRAM COMPLETE: CPT | Performed by: INTERNAL MEDICINE

## 2021-09-20 RX ORDER — METOPROLOL SUCCINATE 50 MG/1
TABLET, EXTENDED RELEASE ORAL
COMMUNITY
Start: 2021-09-03 | End: 2021-09-20

## 2021-09-20 RX ORDER — LOSARTAN POTASSIUM AND HYDROCHLOROTHIAZIDE 25; 100 MG/1; MG/1
1 TABLET ORAL
COMMUNITY
Start: 2021-09-13 | End: 2021-11-16 | Stop reason: SDUPTHER

## 2021-09-20 RX ORDER — LOSARTAN POTASSIUM 100 MG/1
TABLET ORAL
COMMUNITY
Start: 2021-09-10 | End: 2021-09-20

## 2021-09-20 RX ORDER — SIMVASTATIN 40 MG
TABLET ORAL
COMMUNITY
Start: 2021-09-10 | End: 2021-11-16 | Stop reason: SDUPTHER

## 2021-09-20 RX ORDER — VALSARTAN AND HYDROCHLOROTHIAZIDE 320; 25 MG/1; MG/1
TABLET, FILM COATED ORAL
COMMUNITY
Start: 2021-09-17 | End: 2021-09-20

## 2021-09-20 ASSESSMENT — ENCOUNTER SYMPTOMS
DIZZINESS: 1
PND: 0
SHORTNESS OF BREATH: 1
PALPITATIONS: 0
CLAUDICATION: 0
ABDOMINAL PAIN: 0
FEVER: 0
MYALGIAS: 0
ORTHOPNEA: 0
COUGH: 0

## 2021-09-20 ASSESSMENT — FIBROSIS 4 INDEX: FIB4 SCORE: 0.92

## 2021-09-21 NOTE — PROGRESS NOTES
Chief Complaint   Patient presents with   • Hypertension       Subjective     Heide Rangel is a 51 y.o. 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    Hx of meth/marijuana abuse, COPD with current cigarette smoking, CKD, HLD, HA, and recent cortical hemorrhage of L cerebral hemisphere due to HTN.    She presents today with her friend. She has a roommate as well. She has children that are grown up. She has no spouse or significant other.    She doesn't work that she mentions. She is taking her medications and her PCP has been helping adjust her BP medications with her elevated readings.    She has not picked up her new prescription.    She drinks 3 cups of caffeine a day. She smokes cigarettes and marijuana. Did meth before recent hospital admission, but doesn't do this regularly. She has been seen in [place] by a cardiologist but doesn't remember the names, we will look into these records.    She has intermittent squeezing chest pain with elevated BP readings. She has some focal deficits on the left side still. She is planning to see neurology next week.    She doesn't exercise due to arthritis.    Past Medical History:   Diagnosis Date   • COPD (chronic obstructive pulmonary disease) (HCC)    • Hypertension    • Kidney disease    • Substance abuse (HCC)      History reviewed. No pertinent surgical history.  History reviewed. No pertinent family history.  Social History     Socioeconomic History   • Marital status: Unknown     Spouse name: Not on file   • Number of children: Not on file   • Years of education: Not on file   • Highest education level: Not on file   Occupational History   • Not on file   Tobacco Use   • Smoking status: Current Every Day Smoker     Types: Cigarettes   • Smokeless tobacco: Never Used   Substance and Sexual Activity   • Alcohol use: Never   • Drug use: Yes     Types: Marijuana,  Inhaled   • Sexual activity: Not on file   Other Topics Concern   • Not on file   Social History Narrative   • Not on file     Social Determinants of Health     Financial Resource Strain:    • Difficulty of Paying Living Expenses:    Food Insecurity:    • Worried About Running Out of Food in the Last Year:    • Ran Out of Food in the Last Year:    Transportation Needs:    • Lack of Transportation (Medical):    • Lack of Transportation (Non-Medical):    Physical Activity:    • Days of Exercise per Week:    • Minutes of Exercise per Session:    Stress:    • Feeling of Stress :    Social Connections:    • Frequency of Communication with Friends and Family:    • Frequency of Social Gatherings with Friends and Family:    • Attends [employer] Services:    • Active Member of Clubs or Organizations:    • Attends Club or Organization Meetings:    • Marital Status:    Intimate Partner Violence:    • Fear of Current or Ex-Partner:    • Emotionally Abused:    • Physically Abused:    • Sexually Abused:      No Known Allergies  Outpatient Encounter Medications as of 9/20/2021   Medication Sig Dispense Refill   • simvastatin (ZOCOR) 40 MG Tab TAKE 1 TABLET BY MOUTH EVERY DAY IN THE EVENING FOR 30 DAYS     • losartan-hydrochlorothiazide (HYZAAR) 100-25 MG per tablet Take 1 Tablet by mouth every day.     • labetalol (NORMODYNE) 200 MG Tab Take 1 Tablet by mouth 2 times a day. 60 Tablet 1   • albuterol 108 (90 Base) MCG/ACT Aero Soln inhalation aerosol Inhale 2 Puffs every four hours as needed. 8.5 g 0   • [DISCONTINUED] losartan (COZAAR) 100 MG Tab Take  by mouth every day.     • [DISCONTINUED] metoprolol SR (TOPROL XL) 50 MG TABLET SR 24 HR TAKE 1 TABLET EVERY DAY BY ORAL ROUTE FOR 30 DAYS.     • [DISCONTINUED] valsartan-hydrochlorothiazide (DIOVAN-HCT) 320-25 MG per tablet      • [DISCONTINUED] amLODIPine (NORVASC) 10 MG Tab Take 1 Tablet by mouth every day. (Patient not taking: Reported on 9/20/2021) 30 Tablet 1   • [DISCONTINUED]  "lisinopril (PRINIVIL) 20 MG Tab Take 1 Tablet by mouth every day. (Patient not taking: Reported on 9/20/2021) 30 Tablet 1     No facility-administered encounter medications on file as of 9/20/2021.     Review of Systems   Constitutional: Negative for fever and malaise/fatigue.   Respiratory: Positive for shortness of breath. Negative for cough.    Cardiovascular: Positive for chest pain. Negative for palpitations, orthopnea, claudication, leg swelling and PND.        At times when BP is high   Gastrointestinal: Negative for abdominal pain.   Genitourinary: Positive for urgency.   Musculoskeletal: Negative for myalgias.   Neurological: Positive for dizziness.              Objective     BP (!) 162/90 (BP Location: Left arm, Patient Position: Sitting, BP Cuff Size: Adult)   Pulse 71   Resp 14   Ht 1.676 m (5' 6\")   Wt 83.9 kg (185 lb)   SpO2 96%   BMI 29.86 kg/m²     Physical Exam  Vitals and nursing note reviewed.   Constitutional:       Appearance: Normal appearance. She is well-developed and normal weight.   HENT:      Head: Normocephalic and atraumatic.   Neck:      Vascular: No JVD. Carotid bruit:     Cardiovascular:      Rate and Rhythm: Normal rate and regular rhythm.      Pulses: Normal pulses.      Heart sounds: Normal heart sounds.   Pulmonary:      Effort: Pulmonary effort is normal.      Breath sounds: Normal breath sounds.   Musculoskeletal:         General: Normal range of motion.   Skin:     General: Skin is warm and dry.      Capillary Refill: Capillary refill takes less than 2 seconds.   Neurological:      General: No focal deficit present.      Mental Status: She is alert and oriented to person, place, and time. Mental status is at baseline.   Psychiatric:         Mood and Affect: Mood normal.         Behavior: Behavior normal.         Thought Content: Thought content normal.         Judgment: Judgment normal.                Assessment & Plan     1. Hypertension, unspecified type  EKG   2. " Essential hypertension  CBC WITHOUT DIFFERENTIAL    Lipid Profile   3. Chronic kidney disease, unspecified CKD stage  Comp Metabolic Panel   4. Other hyperlipidemia     5. Tobacco abuse     6. Methamphetamine abuse (HCC)     7. Nontraumatic cortical hemorrhage of left cerebral hemisphere (HCC)         Medical Decision Making: Today's Assessment/Status/Plan:      1. HTN  -uncontrolled  -start new medications prescribed by PCP  -keep BP log daily  -if SBP <140 by Friday, she is to call our office for an additional anti-hypertensive  -consider amlodipine 5 mg daily    2. CVA  -follow with neurology    3. CKD  -repeat labs in 6 months    4. HLD  -statin  -repeat labs in 6 months    5. Drug abuse and cigarette smoking  -cessation recommended  -she will work on this, no resources wanted at this time    Patient is to follow up with Magda CHAVARRIA in 2 weeks with BP log.

## 2021-09-29 ENCOUNTER — HOSPITAL ENCOUNTER (OUTPATIENT)
Dept: LAB | Facility: MEDICAL CENTER | Age: 51
End: 2021-09-29
Attending: NURSE PRACTITIONER
Payer: MEDICAID

## 2021-09-29 DIAGNOSIS — I10 ESSENTIAL HYPERTENSION: ICD-10-CM

## 2021-09-29 DIAGNOSIS — N18.9 CHRONIC KIDNEY DISEASE, UNSPECIFIED CKD STAGE: ICD-10-CM

## 2021-09-29 LAB
ALBUMIN SERPL BCP-MCNC: 4.4 G/DL (ref 3.2–4.9)
ALBUMIN/GLOB SERPL: 1.6 G/DL
ALP SERPL-CCNC: 80 U/L (ref 30–99)
ALT SERPL-CCNC: 16 U/L (ref 2–50)
ANION GAP SERPL CALC-SCNC: 10 MMOL/L (ref 7–16)
AST SERPL-CCNC: 18 U/L (ref 12–45)
BILIRUB SERPL-MCNC: 0.5 MG/DL (ref 0.1–1.5)
BUN SERPL-MCNC: 17 MG/DL (ref 8–22)
CALCIUM SERPL-MCNC: 9.5 MG/DL (ref 8.5–10.5)
CHLORIDE SERPL-SCNC: 100 MMOL/L (ref 96–112)
CHOLEST SERPL-MCNC: 161 MG/DL (ref 100–199)
CO2 SERPL-SCNC: 27 MMOL/L (ref 20–33)
CREAT SERPL-MCNC: 1.26 MG/DL (ref 0.5–1.4)
ERYTHROCYTE [DISTWIDTH] IN BLOOD BY AUTOMATED COUNT: 43.8 FL (ref 35.9–50)
FASTING STATUS PATIENT QL REPORTED: NORMAL
GLOBULIN SER CALC-MCNC: 2.7 G/DL (ref 1.9–3.5)
GLUCOSE SERPL-MCNC: 105 MG/DL (ref 65–99)
HCT VFR BLD AUTO: 44.1 % (ref 37–47)
HDLC SERPL-MCNC: 74 MG/DL
HGB BLD-MCNC: 14.8 G/DL (ref 12–16)
LDLC SERPL CALC-MCNC: 74 MG/DL
MCH RBC QN AUTO: 28.1 PG (ref 27–33)
MCHC RBC AUTO-ENTMCNC: 33.6 G/DL (ref 33.6–35)
MCV RBC AUTO: 83.7 FL (ref 81.4–97.8)
PLATELET # BLD AUTO: 238 K/UL (ref 164–446)
PMV BLD AUTO: 10.3 FL (ref 9–12.9)
POTASSIUM SERPL-SCNC: 4 MMOL/L (ref 3.6–5.5)
PROT SERPL-MCNC: 7.1 G/DL (ref 6–8.2)
RBC # BLD AUTO: 5.27 M/UL (ref 4.2–5.4)
SODIUM SERPL-SCNC: 137 MMOL/L (ref 135–145)
TRIGL SERPL-MCNC: 65 MG/DL (ref 0–149)
WBC # BLD AUTO: 7.2 K/UL (ref 4.8–10.8)

## 2021-09-29 PROCEDURE — 36415 COLL VENOUS BLD VENIPUNCTURE: CPT

## 2021-09-29 PROCEDURE — 80061 LIPID PANEL: CPT

## 2021-09-29 PROCEDURE — 80053 COMPREHEN METABOLIC PANEL: CPT

## 2021-09-29 PROCEDURE — 85027 COMPLETE CBC AUTOMATED: CPT

## 2021-10-19 ENCOUNTER — HOSPITAL ENCOUNTER (OUTPATIENT)
Dept: RADIOLOGY | Facility: MEDICAL CENTER | Age: 51
End: 2021-10-19
Attending: NURSE PRACTITIONER
Payer: MEDICAID

## 2021-10-19 DIAGNOSIS — I61.8: ICD-10-CM

## 2021-10-19 PROCEDURE — A9576 INJ PROHANCE MULTIPACK: HCPCS | Performed by: NURSE PRACTITIONER

## 2021-10-19 PROCEDURE — 70553 MRI BRAIN STEM W/O & W/DYE: CPT

## 2021-10-19 PROCEDURE — 700117 HCHG RX CONTRAST REV CODE 255: Performed by: NURSE PRACTITIONER

## 2021-10-19 RX ADMIN — GADOTERIDOL 17 ML: 279.3 INJECTION, SOLUTION INTRAVENOUS at 14:53

## 2021-10-22 ENCOUNTER — OFFICE VISIT (OUTPATIENT)
Dept: CARDIOLOGY | Facility: MEDICAL CENTER | Age: 51
End: 2021-10-22
Payer: MEDICAID

## 2021-10-22 VITALS
WEIGHT: 183 LBS | OXYGEN SATURATION: 96 % | BODY MASS INDEX: 29.41 KG/M2 | HEIGHT: 66 IN | SYSTOLIC BLOOD PRESSURE: 150 MMHG | HEART RATE: 78 BPM | RESPIRATION RATE: 14 BRPM | DIASTOLIC BLOOD PRESSURE: 92 MMHG

## 2021-10-22 DIAGNOSIS — N18.9 CHRONIC KIDNEY DISEASE, UNSPECIFIED CKD STAGE: ICD-10-CM

## 2021-10-22 DIAGNOSIS — I61.1 NONTRAUMATIC CORTICAL HEMORRHAGE OF LEFT CEREBRAL HEMISPHERE (HCC): ICD-10-CM

## 2021-10-22 DIAGNOSIS — I10 ESSENTIAL HYPERTENSION: ICD-10-CM

## 2021-10-22 DIAGNOSIS — F15.10 METHAMPHETAMINE ABUSE (HCC): ICD-10-CM

## 2021-10-22 DIAGNOSIS — E78.49 OTHER HYPERLIPIDEMIA: ICD-10-CM

## 2021-10-22 DIAGNOSIS — Z72.0 TOBACCO ABUSE: ICD-10-CM

## 2021-10-22 PROCEDURE — 99214 OFFICE O/P EST MOD 30 MIN: CPT | Performed by: NURSE PRACTITIONER

## 2021-10-22 RX ORDER — AMLODIPINE BESYLATE 5 MG/1
5 TABLET ORAL EVERY EVENING
Qty: 90 TABLET | Refills: 3 | Status: SHIPPED | OUTPATIENT
Start: 2021-10-22 | End: 2021-11-16 | Stop reason: SDUPTHER

## 2021-10-22 RX ORDER — AMLODIPINE BESYLATE 5 MG/1
TABLET ORAL
COMMUNITY
Start: 2021-10-06 | End: 2021-10-22 | Stop reason: SDUPTHER

## 2021-10-22 RX ORDER — AMLODIPINE BESYLATE 5 MG/1
5 TABLET ORAL EVERY EVENING
Qty: 90 TABLET | Refills: 3 | Status: SHIPPED | OUTPATIENT
Start: 2021-10-22 | End: 2021-10-22 | Stop reason: SDUPTHER

## 2021-10-22 ASSESSMENT — ENCOUNTER SYMPTOMS
PND: 0
SHORTNESS OF BREATH: 1
ORTHOPNEA: 0
FEVER: 0
ABDOMINAL PAIN: 0
PALPITATIONS: 0
COUGH: 0
DIZZINESS: 1
CLAUDICATION: 0
MYALGIAS: 0

## 2021-10-22 ASSESSMENT — FIBROSIS 4 INDEX: FIB4 SCORE: 0.95

## 2021-10-22 NOTE — PROGRESS NOTES
Chief Complaint   Patient presents with   • Hypertension     F/V Dx: Essential hypertension       Subjective     Heide Rangel is a 51 y.o. 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    Hx of meth/marijuana abuse, COPD with current cigarette smoking, CKD, HLD, HA, and recent cortical hemorrhage of L cerebral hemisphere due to HTN.    She presents today with her friend. She has a roommate as well. She has children that are grown up. She has no spouse or significant other.    She doesn't work that she mentions. She is taking her medications and her PCP has been helping adjust her BP medications with her elevated readings. We are now managing her BP medications. She is also following up with neurology and nephrology soon.    She drinks 3 cups of caffeine a day. She smokes cigarettes and marijuana. Did meth before recent hospital admission, but doesn't do this regularly. She has been seen in [place] by a cardiologist but doesn't remember the names, we were not able to obtain records.    She has intermittent squeezing chest pain with elevated BP readings. She has some focal deficits on the left side still. She doesn't exercise due to arthritis.    Past Medical History:   Diagnosis Date   • COPD (chronic obstructive pulmonary disease) (HCC)    • Hypertension    • Kidney disease    • Substance abuse (HCC)      History reviewed. No pertinent surgical history.  History reviewed. No pertinent family history.  Social History     Socioeconomic History   • Marital status: Single     Spouse name: Not on file   • Number of children: Not on file   • Years of education: Not on file   • Highest education level: Not on file   Occupational History   • Not on file   Tobacco Use   • Smoking status: Current Every Day Smoker     Types: Cigarettes   • Smokeless tobacco: Never Used   Substance and Sexual Activity   • Alcohol use: Never   • Drug use: Yes      Types: Marijuana, Inhaled   • Sexual activity: Not on file   Other Topics Concern   • Not on file   Social History Narrative   • Not on file     Social Determinants of Health     Financial Resource Strain:    • Difficulty of Paying Living Expenses:    Food Insecurity:    • Worried About Running Out of Food in the Last Year:    • Ran Out of Food in the Last Year:    Transportation Needs:    • Lack of Transportation (Medical):    • Lack of Transportation (Non-Medical):    Physical Activity:    • Days of Exercise per Week:    • Minutes of Exercise per Session:    Stress:    • Feeling of Stress :    Social Connections:    • Frequency of Communication with Friends and Family:    • Frequency of Social Gatherings with Friends and Family:    • Attends [employer] Services:    • Active Member of Clubs or Organizations:    • Attends Club or Organization Meetings:    • Marital Status:    Intimate Partner Violence:    • Fear of Current or Ex-Partner:    • Emotionally Abused:    • Physically Abused:    • Sexually Abused:      No Known Allergies  Outpatient Encounter Medications as of 10/22/2021   Medication Sig Dispense Refill   • amLODIPine (NORVASC) 5 MG Tab Take 1 Tablet by mouth every evening. 90 Tablet 3   • simvastatin (ZOCOR) 40 MG Tab TAKE 1 TABLET BY MOUTH EVERY DAY IN THE EVENING FOR 30 DAYS     • losartan-hydrochlorothiazide (HYZAAR) 100-25 MG per tablet Take 1 Tablet by mouth every day.     • labetalol (NORMODYNE) 200 MG Tab Take 1 Tablet by mouth 2 times a day. 60 Tablet 1   • albuterol 108 (90 Base) MCG/ACT Aero Soln inhalation aerosol Inhale 2 Puffs every four hours as needed. 8.5 g 0   • [DISCONTINUED] amLODIPine (NORVASC) 5 MG Tab      • [DISCONTINUED] amLODIPine (NORVASC) 5 MG Tab Take 1 Tablet by mouth every evening. 90 Tablet 3     No facility-administered encounter medications on file as of 10/22/2021.     Review of Systems   Constitutional: Negative for fever and malaise/fatigue.   Respiratory: Positive for  "shortness of breath. Negative for cough.    Cardiovascular: Positive for chest pain. Negative for palpitations, orthopnea, claudication, leg swelling and PND.        At times when BP is high   Gastrointestinal: Negative for abdominal pain.   Genitourinary: Positive for urgency.   Musculoskeletal: Negative for myalgias.   Neurological: Positive for dizziness.              Objective     /92 (BP Location: Left arm, Patient Position: Sitting, BP Cuff Size: Adult)   Pulse 78   Resp 14   Ht 1.676 m (5' 6\")   Wt 83 kg (183 lb)   SpO2 96%   BMI 29.54 kg/m²     Physical Exam  Vitals and nursing note reviewed.   Constitutional:       Appearance: Normal appearance. She is well-developed and normal weight.   HENT:      Head: Normocephalic and atraumatic.   Neck:      Vascular: No JVD. Carotid bruit:     Cardiovascular:      Rate and Rhythm: Normal rate and regular rhythm.      Pulses: Normal pulses.      Heart sounds: Normal heart sounds.   Pulmonary:      Effort: Pulmonary effort is normal.      Breath sounds: Normal breath sounds.   Musculoskeletal:         General: Normal range of motion.   Skin:     General: Skin is warm and dry.      Capillary Refill: Capillary refill takes less than 2 seconds.   Neurological:      General: No focal deficit present.      Mental Status: She is alert and oriented to person, place, and time. Mental status is at baseline.   Psychiatric:         Mood and Affect: Mood normal.         Behavior: Behavior normal.         Thought Content: Thought content normal.         Judgment: Judgment normal.                Assessment & Plan     1. Essential hypertension     2. Nontraumatic cortical hemorrhage of left cerebral hemisphere (HCC)     3. Chronic kidney disease, unspecified CKD stage     4. Other hyperlipidemia     5. Tobacco abuse     6. Methamphetamine abuse (HCC)         Medical Decision Making: Today's Assessment/Status/Plan:      1. HTN  -uncontrolled  -cont labetalol 200 mg BID, " losartan-HCTZ 100-25 QAM  -keep BP log daily  -if SBP <130 consistently  -add amlodipine 5 mg QPM    2. CVA  -follow with neurology    3. CKD  -repeat labs in 6 months    4. HLD  -statin  -repeat labs in 6 months    5. Drug abuse and cigarette smoking  -cessation recommended  -she will work on this, no resources wanted at this time    Patient is to follow up with Magda CHAVARRIA in 2 weeks with BP log.

## 2021-12-07 ENCOUNTER — OFFICE VISIT (OUTPATIENT)
Dept: CARDIOLOGY | Facility: MEDICAL CENTER | Age: 51
End: 2021-12-07
Payer: MEDICAID

## 2021-12-07 VITALS
RESPIRATION RATE: 14 BRPM | HEART RATE: 78 BPM | WEIGHT: 186 LBS | BODY MASS INDEX: 29.89 KG/M2 | OXYGEN SATURATION: 98 % | HEIGHT: 66 IN | DIASTOLIC BLOOD PRESSURE: 90 MMHG | SYSTOLIC BLOOD PRESSURE: 124 MMHG

## 2021-12-07 DIAGNOSIS — Z72.0 TOBACCO ABUSE: ICD-10-CM

## 2021-12-07 DIAGNOSIS — I10 ESSENTIAL HYPERTENSION: ICD-10-CM

## 2021-12-07 DIAGNOSIS — N18.9 CHRONIC KIDNEY DISEASE, UNSPECIFIED CKD STAGE: ICD-10-CM

## 2021-12-07 DIAGNOSIS — E78.49 OTHER HYPERLIPIDEMIA: ICD-10-CM

## 2021-12-07 DIAGNOSIS — I61.1 NONTRAUMATIC CORTICAL HEMORRHAGE OF LEFT CEREBRAL HEMISPHERE (HCC): ICD-10-CM

## 2021-12-07 PROCEDURE — 99214 OFFICE O/P EST MOD 30 MIN: CPT | Performed by: NURSE PRACTITIONER

## 2021-12-07 RX ORDER — METOPROLOL SUCCINATE 25 MG/1
TABLET, EXTENDED RELEASE ORAL
COMMUNITY
Start: 2021-11-24 | End: 2021-12-07

## 2021-12-07 RX ORDER — AMLODIPINE BESYLATE 5 MG/1
5 TABLET ORAL EVERY EVENING
Qty: 30 TABLET | Refills: 11 | Status: SHIPPED | OUTPATIENT
Start: 2021-12-07 | End: 2022-12-13 | Stop reason: SDUPTHER

## 2021-12-07 RX ORDER — LOSARTAN POTASSIUM AND HYDROCHLOROTHIAZIDE 25; 100 MG/1; MG/1
1 TABLET ORAL
Qty: 30 TABLET | Refills: 11 | Status: SHIPPED | OUTPATIENT
Start: 2021-12-07 | End: 2022-12-09 | Stop reason: SDUPTHER

## 2021-12-07 RX ORDER — SIMVASTATIN 40 MG
40 TABLET ORAL EVERY EVENING
Qty: 30 TABLET | Refills: 11 | Status: SHIPPED | OUTPATIENT
Start: 2021-12-07 | End: 2022-12-09 | Stop reason: SDUPTHER

## 2021-12-07 RX ORDER — AMLODIPINE BESYLATE 10 MG/1
TABLET ORAL
COMMUNITY
Start: 2021-11-25 | End: 2021-12-07

## 2021-12-07 RX ORDER — LABETALOL 200 MG/1
200 TABLET, FILM COATED ORAL 2 TIMES DAILY
Qty: 60 TABLET | Refills: 11 | Status: SHIPPED | OUTPATIENT
Start: 2021-12-07 | End: 2022-01-10

## 2021-12-07 ASSESSMENT — ENCOUNTER SYMPTOMS
DIZZINESS: 0
PND: 0
HEADACHES: 1
ORTHOPNEA: 0
FEVER: 0
COUGH: 0
ABDOMINAL PAIN: 0
SHORTNESS OF BREATH: 0
CLAUDICATION: 0
MYALGIAS: 0
PALPITATIONS: 0

## 2021-12-07 ASSESSMENT — FIBROSIS 4 INDEX: FIB4 SCORE: 0.95

## 2021-12-07 NOTE — PROGRESS NOTES
Chief Complaint   Patient presents with   • Hypertension     F/V Dx: Essential hypertension   • Hyperlipidemia     F/V Dx: Other hyperlipidemia       Subjective     Heide Rangel is a 51 y.o. 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    Hx of meth/marijuana abuse, COPD with current cigarette smoking, CKD, HLD, HA, and recent cortical hemorrhage of L cerebral hemisphere due to HTN.    She presents today with her sister. She has children that are grown up. She has no spouse or significant other.    She is planning on going to [place] for the holidays next week.    She doesn't work that she mentions. She is taking her medications but [facility] has been messing with her refills. She is switching pharmacies now.     She drinks 3 cups of caffeine a day. She smokes cigarettes and marijuana. Did meth before recent hospital admission, but doesn't do this regularly. She has been seen in [place] by a cardiologist but doesn't remember the names, we were not able to obtain records.    She has intermittent headaches.    Her BP today is finally within normal range with medication adherence.     Past Medical History:   Diagnosis Date   • COPD (chronic obstructive pulmonary disease) (HCC)    • Hypertension    • Kidney disease    • Substance abuse (HCC)      History reviewed. No pertinent surgical history.  History reviewed. No pertinent family history.  Social History     Socioeconomic History   • Marital status: Single     Spouse name: Not on file   • Number of children: Not on file   • Years of education: Not on file   • Highest education level: Not on file   Occupational History   • Not on file   Tobacco Use   • Smoking status: Current Every Day Smoker     Types: Cigarettes   • Smokeless tobacco: Never Used   Substance and Sexual Activity   • Alcohol use: Never   • Drug use: Yes     Types: Marijuana, Inhaled   • Sexual activity: Not on file   Other Topics  Concern   • Not on file   Social History Narrative   • Not on file     Social Determinants of Health     Financial Resource Strain:    • Difficulty of Paying Living Expenses: Not on file   Food Insecurity:    • Worried About Running Out of Food in the Last Year: Not on file   • Ran Out of Food in the Last Year: Not on file   Transportation Needs:    • Lack of Transportation (Medical): Not on file   • Lack of Transportation (Non-Medical): Not on file   Physical Activity:    • Days of Exercise per Week: Not on file   • Minutes of Exercise per Session: Not on file   Stress:    • Feeling of Stress : Not on file   Social Connections:    • Frequency of Communication with Friends and Family: Not on file   • Frequency of Social Gatherings with Friends and Family: Not on file   • Attends Latter day Services: Not on file   • Active Member of Clubs or Organizations: Not on file   • Attends Club or Organization Meetings: Not on file   • Marital Status: Not on file   Intimate Partner Violence:    • Fear of Current or Ex-Partner: Not on file   • Emotionally Abused: Not on file   • Physically Abused: Not on file   • Sexually Abused: Not on file   Housing Stability:    • Unable to Pay for Housing in the Last Year: Not on file   • Number of Places Lived in the Last Year: Not on file   • Unstable Housing in the Last Year: Not on file     No Known Allergies  Outpatient Encounter Medications as of 12/7/2021   Medication Sig Dispense Refill   • amLODIPine (NORVASC) 5 MG Tab Take 1 Tablet by mouth every evening. 30 Tablet 11   • labetalol (NORMODYNE) 200 MG Tab Take 1 Tablet by mouth 2 times a day. 60 Tablet 11   • losartan-hydrochlorothiazide (HYZAAR) 100-25 MG per tablet Take 1 Tablet by mouth every day. 30 Tablet 11   • simvastatin (ZOCOR) 40 MG Tab Take 1 Tablet by mouth every evening. 30 Tablet 11   • albuterol 108 (90 Base) MCG/ACT Aero Soln inhalation aerosol Inhale 2 Puffs every four hours as needed. 8.5 g 0   • [DISCONTINUED]  "amLODIPine (NORVASC) 10 MG Tab      • [DISCONTINUED] metoprolol SR (TOPROL XL) 25 MG TABLET SR 24 HR      • [DISCONTINUED] labetalol (NORMODYNE) 200 MG Tab Take 1 Tablet by mouth 2 times a day. 60 Tablet 1   • [DISCONTINUED] amLODIPine (NORVASC) 5 MG Tab Take 1 Tablet by mouth every evening. 90 Tablet 3   • [DISCONTINUED] losartan-hydrochlorothiazide (HYZAAR) 100-25 MG per tablet Take 1 Tablet by mouth every day. 90 Tablet 3   • [DISCONTINUED] simvastatin (ZOCOR) 40 MG Tab Take 1 Tablet by mouth every evening. 90 Tablet 3     No facility-administered encounter medications on file as of 12/7/2021.     Review of Systems   Constitutional: Negative for fever and malaise/fatigue.   Respiratory: Negative for cough and shortness of breath.    Cardiovascular: Negative for chest pain, palpitations, orthopnea, claudication, leg swelling and PND.   Gastrointestinal: Negative for abdominal pain.   Genitourinary: Negative for urgency.   Musculoskeletal: Negative for myalgias.   Neurological: Positive for headaches. Negative for dizziness.              Objective     /90 (BP Location: Left arm, Patient Position: Sitting, BP Cuff Size: Adult)   Pulse 78   Resp 14   Ht 1.676 m (5' 6\")   Wt 84.4 kg (186 lb)   SpO2 98%   BMI 30.02 kg/m²     Physical Exam  Vitals and nursing note reviewed.   Constitutional:       Appearance: Normal appearance. She is well-developed and normal weight.   HENT:      Head: Normocephalic and atraumatic.   Neck:      Vascular: No JVD. Carotid bruit:     Cardiovascular:      Rate and Rhythm: Normal rate and regular rhythm.      Pulses: Normal pulses.      Heart sounds: Normal heart sounds.   Pulmonary:      Effort: Pulmonary effort is normal.      Breath sounds: Normal breath sounds.   Musculoskeletal:         General: Normal range of motion.   Skin:     General: Skin is warm and dry.      Capillary Refill: Capillary refill takes less than 2 seconds.   Neurological:      General: No focal deficit " present.      Mental Status: She is alert and oriented to person, place, and time. Mental status is at baseline.   Psychiatric:         Mood and Affect: Mood normal.         Behavior: Behavior normal.         Thought Content: Thought content normal.         Judgment: Judgment normal.                Assessment & Plan     1. Tobacco abuse     2. Other hyperlipidemia     3. Chronic kidney disease, unspecified CKD stage     4. Nontraumatic cortical hemorrhage of left cerebral hemisphere (HCC)  CATECHOLAMINE UR FREE 24 HR    METANEPHRINES, PHEOCHROMOCYT   5. Essential hypertension  CATECHOLAMINE UR FREE 24 HR    METANEPHRINES, PHEOCHROMOCYT       Medical Decision Making: Today's Assessment/Status/Plan:      1. HTN  -finally improved today with adherence to medications  -cont labetalol 200 mg BID, losartan-HCTZ 100-25 QAM, amlodipine 5 mg QPM  -keep BP log daily  -if SBP <130/80 consistently  -refilled all medications to [facility] for one year  -recommend consider stress imaging at next apt  -sister had pheochromocytoma, order catecholamine and metanephrine workup for review    2. CVA  -follow with neurology    3. CKD  -repeat labs in 6 months    4. HLD  -statin  -repeat labs in 6 months    5. Drug abuse and cigarette smoking  -cessation recommended  -she will work on this, no resources wanted at this time    Patient is to follow up with Magda CHAVARRIA in 2 months with BP log, pheochromocytoma work up.

## 2022-01-08 DIAGNOSIS — I10 ESSENTIAL HYPERTENSION: ICD-10-CM

## 2022-01-10 RX ORDER — LABETALOL 200 MG/1
TABLET, FILM COATED ORAL
Qty: 180 TABLET | Refills: 3 | Status: SHIPPED | OUTPATIENT
Start: 2022-01-10 | End: 2022-12-09 | Stop reason: SDUPTHER

## 2022-02-01 ENCOUNTER — TELEPHONE (OUTPATIENT)
Dept: CARDIOLOGY | Facility: MEDICAL CENTER | Age: 52
End: 2022-02-01

## 2022-02-01 NOTE — TELEPHONE ENCOUNTER
LVM for pt in regards to lab work that was ordered at previous OV with SC. Office number given for call back if pt has any questions or has had lab work done outside of [facility]. Pt has follow up appointment scheduled with SC on 2/8/22.

## 2022-06-30 NOTE — PROGRESS NOTES
4 Eyes Skin Assessment Completed by Alli RN and MASSIMO Pearl.    Head WDL  Ears WDL  Nose WDL  Mouth WDL  Neck WDL  Breast/Chest WDL  Shoulder Blades WDL  Spine WDL  (R) Arm/Elbow/Hand WDL  (L) Arm/Elbow/Hand WDL  Abdomen WDL  Groin WDL  Scrotum/Coccyx/Buttocks WDL  (R) Leg WDL  (L) Leg WDL  (R) Heel/Foot/Toe WDL  (L) Heel/Foot/Toe WDL          Devices In Places SCD's      Interventions In Place Q2 Turns and Low Air Loss Mattress    Possible Skin Injury No    Pictures Uploaded Into Epic N/A  Wound Consult Placed N/A  RN Wound Prevention Protocol Ordered No      Valtrex Pregnancy And Lactation Text: this medication is Pregnancy Category B and is considered safe during pregnancy. This medication is not directly found in breast milk but it's metabolite acyclovir is present.

## 2022-12-09 ENCOUNTER — PATIENT MESSAGE (OUTPATIENT)
Dept: CARDIOLOGY | Facility: MEDICAL CENTER | Age: 52
End: 2022-12-09
Payer: COMMERCIAL

## 2022-12-09 DIAGNOSIS — I10 ESSENTIAL HYPERTENSION: ICD-10-CM

## 2022-12-12 RX ORDER — LABETALOL 200 MG/1
200 TABLET, FILM COATED ORAL 2 TIMES DAILY
Qty: 180 TABLET | Refills: 0 | Status: SHIPPED | OUTPATIENT
Start: 2022-12-12 | End: 2023-06-09 | Stop reason: SDUPTHER

## 2022-12-12 NOTE — TELEPHONE ENCOUNTER
Is the patient due for a refill? Yes    Was the patient seen the past year? No    Date of last office visit: 12/7/21    Does the patient have an upcoming appointment?  No       Provider to refill: SC     Does the patients insurance require a 100 day supply?  No     No

## 2022-12-13 ENCOUNTER — PATIENT MESSAGE (OUTPATIENT)
Dept: CARDIOLOGY | Facility: MEDICAL CENTER | Age: 52
End: 2022-12-13
Payer: COMMERCIAL

## 2022-12-13 RX ORDER — LOSARTAN POTASSIUM AND HYDROCHLOROTHIAZIDE 25; 100 MG/1; MG/1
1 TABLET ORAL
Qty: 90 TABLET | Refills: 0 | Status: SHIPPED | OUTPATIENT
Start: 2022-12-13 | End: 2023-06-09 | Stop reason: SDUPTHER

## 2022-12-13 RX ORDER — AMLODIPINE BESYLATE 5 MG/1
5 TABLET ORAL EVERY EVENING
Qty: 30 TABLET | Refills: 11 | Status: SHIPPED | OUTPATIENT
Start: 2022-12-13 | End: 2023-06-09 | Stop reason: SDUPTHER

## 2022-12-13 RX ORDER — SIMVASTATIN 40 MG
40 TABLET ORAL EVERY EVENING
Qty: 90 TABLET | Refills: 0 | Status: SHIPPED | OUTPATIENT
Start: 2022-12-13 | End: 2023-06-09 | Stop reason: SDUPTHER

## 2022-12-13 NOTE — TELEPHONE ENCOUNTER
Is the patient due for a refill? Yes    Was the patient seen the past year? No    Date of last office visit: 12/7/2021    Does the patient have an upcoming appointment?  No   If yes, When?     Provider to refill:SC    Does the patients insurance require a 100 day supply?  No

## 2022-12-13 NOTE — PATIENT COMMUNICATION
KECIA  I have recorded all patients medications and advised her start taking them as soon as she gets them.

## 2022-12-27 ENCOUNTER — APPOINTMENT (OUTPATIENT)
Dept: CARDIOLOGY | Facility: MEDICAL CENTER | Age: 52
End: 2022-12-27
Payer: COMMERCIAL

## 2023-06-04 ENCOUNTER — PATIENT MESSAGE (OUTPATIENT)
Dept: CARDIOLOGY | Facility: MEDICAL CENTER | Age: 53
End: 2023-06-04
Payer: COMMERCIAL

## 2023-06-05 NOTE — PATIENT COMMUNICATION
Phone Number Called: [phone]    Call outcome: Left detailed message for patient. Informed to call back with any additional questions.    Message: Called to discuss patients blood pressure readings. Advised patient to go into ER for medical assistance for very elevated blood pressure readings. Asked for patient to return call or scheduled an appointment asap with our office.

## 2023-06-05 NOTE — TELEPHONE ENCOUNTER
"Called patients daughter to check on patient in regards to high blood pressure. Patients daughter stated, \"she is very stubborn and will not call.\" I discussed the seriousness of this situation and patients daughter understood. We set up an appointment for Friday at 2:45 for a follow up with SC.   "

## 2023-06-05 NOTE — TELEPHONE ENCOUNTER
ABHISHEK MCDONNELL patient was last seen 12/2021. Recommending patient to go to ER asap and call for an appointment.

## 2023-06-09 ENCOUNTER — OFFICE VISIT (OUTPATIENT)
Dept: CARDIOLOGY | Facility: MEDICAL CENTER | Age: 53
End: 2023-06-09
Attending: NURSE PRACTITIONER
Payer: COMMERCIAL

## 2023-06-09 VITALS
WEIGHT: 183 LBS | HEART RATE: 78 BPM | RESPIRATION RATE: 16 BRPM | BODY MASS INDEX: 28.72 KG/M2 | OXYGEN SATURATION: 97 % | HEIGHT: 67 IN | SYSTOLIC BLOOD PRESSURE: 142 MMHG | DIASTOLIC BLOOD PRESSURE: 102 MMHG

## 2023-06-09 DIAGNOSIS — E78.49 OTHER HYPERLIPIDEMIA: ICD-10-CM

## 2023-06-09 DIAGNOSIS — I61.1 NONTRAUMATIC CORTICAL HEMORRHAGE OF LEFT CEREBRAL HEMISPHERE (HCC): ICD-10-CM

## 2023-06-09 DIAGNOSIS — I10 ESSENTIAL HYPERTENSION: ICD-10-CM

## 2023-06-09 DIAGNOSIS — N18.9 CHRONIC KIDNEY DISEASE, UNSPECIFIED CKD STAGE: ICD-10-CM

## 2023-06-09 DIAGNOSIS — Z72.0 TOBACCO ABUSE: ICD-10-CM

## 2023-06-09 PROCEDURE — 3080F DIAST BP >= 90 MM HG: CPT | Performed by: NURSE PRACTITIONER

## 2023-06-09 PROCEDURE — 99214 OFFICE O/P EST MOD 30 MIN: CPT | Performed by: NURSE PRACTITIONER

## 2023-06-09 PROCEDURE — 99211 OFF/OP EST MAY X REQ PHY/QHP: CPT | Performed by: NURSE PRACTITIONER

## 2023-06-09 PROCEDURE — 3077F SYST BP >= 140 MM HG: CPT | Performed by: NURSE PRACTITIONER

## 2023-06-09 RX ORDER — SIMVASTATIN 40 MG
40 TABLET ORAL EVERY EVENING
Qty: 100 TABLET | Refills: 3 | Status: SHIPPED | OUTPATIENT
Start: 2023-06-09 | End: 2023-09-19

## 2023-06-09 RX ORDER — LABETALOL 200 MG/1
200 TABLET, FILM COATED ORAL 2 TIMES DAILY
Qty: 200 TABLET | Refills: 3 | Status: SHIPPED | OUTPATIENT
Start: 2023-06-09 | End: 2023-11-08

## 2023-06-09 RX ORDER — AMLODIPINE BESYLATE 5 MG/1
5 TABLET ORAL EVERY EVENING
Qty: 100 TABLET | Refills: 3 | Status: SHIPPED | OUTPATIENT
Start: 2023-06-09 | End: 2023-07-07

## 2023-06-09 RX ORDER — LOSARTAN POTASSIUM AND HYDROCHLOROTHIAZIDE 25; 100 MG/1; MG/1
1 TABLET ORAL
Qty: 100 TABLET | Refills: 3 | Status: SHIPPED | OUTPATIENT
Start: 2023-06-09 | End: 2023-11-08

## 2023-06-09 ASSESSMENT — FIBROSIS 4 INDEX: FIB4 SCORE: 1.24

## 2023-06-09 ASSESSMENT — ENCOUNTER SYMPTOMS
CLAUDICATION: 0
HEADACHES: 1
DIZZINESS: 0
SHORTNESS OF BREATH: 0
MYALGIAS: 0
ABDOMINAL PAIN: 0
FEVER: 0
COUGH: 0
ORTHOPNEA: 0
PALPITATIONS: 0
PND: 0

## 2023-06-09 NOTE — PROGRESS NOTES
Chief Complaint   Patient presents with    Hypertension    Hyperlipidemia     Subjective     Heide Rangel is a 53 y.o. female who presents today for annual follow up but noted to be non-compliant with her medications due to lack of follow up.    Hx of meth/marijuana abuse, COPD with current cigarette smoking, CKD, HLD, HA, and recent cortical hemorrhage of L cerebral hemisphere due to HTN.    She presents today alone. She has children that are grown up. She has no spouse or significant other.    She has not been taking her BP medication and knows the importance of taking her medication.    She has had some progressive headaches, lip numbness that is mild, and some chest discomfort when her BP is high.    She has no shortness of breath, edema, dizziness/lightheadedness, or palpitations.    Past Medical History:   Diagnosis Date    COPD (chronic obstructive pulmonary disease) (HCC)     Hypertension     Kidney disease     Substance abuse (HCC)      History reviewed. No pertinent surgical history.  History reviewed. No pertinent family history.  Social History     Socioeconomic History    Marital status: Single     Spouse name: Not on file    Number of children: Not on file    Years of education: Not on file    Highest education level: Not on file   Occupational History    Not on file   Tobacco Use    Smoking status: Every Day     Types: Cigarettes    Smokeless tobacco: Never   Substance and Sexual Activity    Alcohol use: Yes     Comment: 2 drinks a month    Drug use: Yes     Types: Marijuana, Inhaled    Sexual activity: Not on file   Other Topics Concern    Not on file   Social History Narrative    Not on file     Social Determinants of Health     Financial Resource Strain: Not on file   Food Insecurity: Not on file   Transportation Needs: Not on file   Physical Activity: Not on file   Stress: Not on file   Social Connections: Not on file   Intimate Partner Violence: Not on file   Housing Stability: Not on file  "    No Known Allergies  Outpatient Encounter Medications as of 6/9/2023   Medication Sig Dispense Refill    amLODIPine (NORVASC) 5 MG Tab Take 1 Tablet by mouth every evening. 100 Tablet 3    labetalol (NORMODYNE) 200 MG Tab Take 1 Tablet by mouth 2 times a day. 200 Tablet 3    losartan-hydrochlorothiazide (HYZAAR) 100-25 MG per tablet Take 1 Tablet by mouth every day. 100 Tablet 3    simvastatin (ZOCOR) 40 MG Tab Take 1 Tablet by mouth every evening. 100 Tablet 3    albuterol 108 (90 Base) MCG/ACT Aero Soln inhalation aerosol Inhale 2 Puffs every four hours as needed. 8.5 g 0    [DISCONTINUED] losartan-hydrochlorothiazide (HYZAAR) 100-25 MG per tablet Take 1 Tablet by mouth every day. 90 Tablet 0    [DISCONTINUED] simvastatin (ZOCOR) 40 MG Tab Take 1 Tablet by mouth every evening. 90 Tablet 0    [DISCONTINUED] amLODIPine (NORVASC) 5 MG Tab Take 1 Tablet by mouth every evening. 30 Tablet 11    [DISCONTINUED] labetalol (NORMODYNE) 200 MG Tab Take 1 Tablet by mouth 2 times a day. 180 Tablet 0     No facility-administered encounter medications on file as of 6/9/2023.     Review of Systems   Constitutional:  Negative for fever and malaise/fatigue.   Respiratory:  Negative for cough and shortness of breath.    Cardiovascular:  Positive for chest pain. Negative for palpitations, orthopnea, claudication, leg swelling and PND.   Gastrointestinal:  Negative for abdominal pain.   Genitourinary:  Negative for urgency.   Musculoskeletal:  Negative for myalgias.   Neurological:  Positive for headaches. Negative for dizziness.              Objective     BP (!) 142/102 (BP Location: Left arm, Patient Position: Sitting, BP Cuff Size: Adult)   Pulse 78   Resp 16   Ht 1.689 m (5' 6.5\")   Wt 83 kg (183 lb)   SpO2 97%   BMI 29.09 kg/m²     Physical Exam  Vitals and nursing note reviewed.   Constitutional:       Appearance: Normal appearance. She is well-developed and normal weight.   HENT:      Head: Normocephalic and atraumatic. "   Neck:      Vascular: No JVD. Carotid bruit:  .  Cardiovascular:      Rate and Rhythm: Normal rate and regular rhythm.      Pulses: Normal pulses.      Heart sounds: Normal heart sounds.   Pulmonary:      Effort: Pulmonary effort is normal.      Breath sounds: Normal breath sounds.   Musculoskeletal:         General: Normal range of motion.   Skin:     General: Skin is warm and dry.      Capillary Refill: Capillary refill takes less than 2 seconds.   Neurological:      General: No focal deficit present.      Mental Status: She is alert and oriented to person, place, and time. Mental status is at baseline.   Psychiatric:         Mood and Affect: Mood normal.         Behavior: Behavior normal.         Thought Content: Thought content normal.         Judgment: Judgment normal.                Assessment & Plan     1. Chronic kidney disease, unspecified CKD stage        2. Essential hypertension  amLODIPine (NORVASC) 5 MG Tab    labetalol (NORMODYNE) 200 MG Tab    losartan-hydrochlorothiazide (HYZAAR) 100-25 MG per tablet      3. 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 Other hyperlipidemia  simvastatin (ZOCOR) 40 MG Tab    Lipid Profile    Basic Metabolic Panel      5. Tobacco abuse            Medical Decision Making: Today's Assessment/Status/Plan:      1. HTN  -poor control due to non-compliance  -cont labetalol 200 mg BID, losartan-HCTZ 100-25 QAM, amlodipine 5 mg QPM  -keep BP log daily  -if SBP <130/80 consistently  -refilled all medications to pharmacy  -recommend consider stress imaging at next apt  -sister had pheochromocytoma, order catecholamine and metanephrine workup for review, she never got these done-obtain with next set of labs    2. CVA  -follow with neurology    3. CKD  -repeat labs in 6 months    4. HLD  -statin  -repeat labs in 6 months    5. Drug abuse and cigarette smoking  -cessation recommended  -she will work on this, no resources wanted at this  time    Patient is to follow up with Magda CHAVARRIA in 1 month with BP log and labs.

## 2023-06-09 NOTE — PATIENT INSTRUCTIONS
Obtain labs before our next follow up in 1 month.    BP check once a day and bring log for next apt.    BP goal <130/80

## 2023-07-07 ENCOUNTER — OFFICE VISIT (OUTPATIENT)
Dept: CARDIOLOGY | Facility: MEDICAL CENTER | Age: 53
End: 2023-07-07
Attending: NURSE PRACTITIONER
Payer: COMMERCIAL

## 2023-07-07 VITALS
SYSTOLIC BLOOD PRESSURE: 160 MMHG | HEART RATE: 80 BPM | DIASTOLIC BLOOD PRESSURE: 90 MMHG | HEIGHT: 67 IN | WEIGHT: 180 LBS | OXYGEN SATURATION: 93 % | BODY MASS INDEX: 28.25 KG/M2 | RESPIRATION RATE: 18 BRPM

## 2023-07-07 DIAGNOSIS — N18.9 CHRONIC KIDNEY DISEASE, UNSPECIFIED CKD STAGE: ICD-10-CM

## 2023-07-07 DIAGNOSIS — I10 ESSENTIAL HYPERTENSION: Primary | ICD-10-CM

## 2023-07-07 DIAGNOSIS — E78.49 OTHER HYPERLIPIDEMIA: ICD-10-CM

## 2023-07-07 DIAGNOSIS — I61.1 NONTRAUMATIC CORTICAL HEMORRHAGE OF LEFT CEREBRAL HEMISPHERE (HCC): ICD-10-CM

## 2023-07-07 PROCEDURE — 99212 OFFICE O/P EST SF 10 MIN: CPT | Performed by: NURSE PRACTITIONER

## 2023-07-07 PROCEDURE — 3077F SYST BP >= 140 MM HG: CPT | Performed by: NURSE PRACTITIONER

## 2023-07-07 PROCEDURE — 3080F DIAST BP >= 90 MM HG: CPT | Performed by: NURSE PRACTITIONER

## 2023-07-07 PROCEDURE — 99214 OFFICE O/P EST MOD 30 MIN: CPT | Performed by: NURSE PRACTITIONER

## 2023-07-07 RX ORDER — AMLODIPINE BESYLATE 10 MG/1
1 TABLET ORAL
COMMUNITY
End: 2023-08-17 | Stop reason: SDUPTHER

## 2023-07-07 RX ORDER — HYDRALAZINE HYDROCHLORIDE 25 MG/1
25 TABLET, FILM COATED ORAL 2 TIMES DAILY
Qty: 200 TABLET | Refills: 3 | Status: SHIPPED | OUTPATIENT
Start: 2023-07-07 | End: 2023-08-18 | Stop reason: SDUPTHER

## 2023-07-07 RX ORDER — CYCLOBENZAPRINE HCL 10 MG
1 TABLET ORAL 2 TIMES DAILY
COMMUNITY
End: 2023-07-07

## 2023-07-07 RX ORDER — METOPROLOL SUCCINATE 50 MG/1
TABLET, EXTENDED RELEASE ORAL
COMMUNITY
End: 2023-07-07

## 2023-07-07 RX ORDER — METOPROLOL SUCCINATE 25 MG/1
1 TABLET, EXTENDED RELEASE ORAL
COMMUNITY
End: 2023-07-07

## 2023-07-07 ASSESSMENT — ENCOUNTER SYMPTOMS
MYALGIAS: 0
CLAUDICATION: 0
FEVER: 0
PND: 0
COUGH: 0
SHORTNESS OF BREATH: 1
BRUISES/BLEEDS EASILY: 0
SEIZURES: 0
DIZZINESS: 1
FOCAL WEAKNESS: 0
SHORTNESS OF BREATH: 0
PALPITATIONS: 1
LOSS OF CONSCIOUSNESS: 0
HEADACHES: 0
CHILLS: 0
BLURRED VISION: 1
ABDOMINAL PAIN: 0
DIZZINESS: 0
PALPITATIONS: 0
HEADACHES: 1
TINGLING: 1
ORTHOPNEA: 0
WEIGHT LOSS: 0

## 2023-07-07 ASSESSMENT — FIBROSIS 4 INDEX: FIB4 SCORE: 1.24

## 2023-07-07 NOTE — PROGRESS NOTES
Cardiology Progress Note    Date of Service  7/7/2023    Attending: Pat Russ    Primary Care Physician  PAT Carey    Chief Complaint  1 month f/u for HTN      History of Presenting Illness (HPI):   Heide Rangel is a 53 y.o. female with PMH of meth/marijuana abuse, COPD with current cigarette smoking, CKD, HLD, HA, and cortical hemorrhage of L cerebral hemisphere due to HTN who presents for f/u.    Taking BP meds: labetalol 200 mg BID, losartan-HCTZ 100-25 QAM, amlodipine 5 mg QPM.     BP at home 235-132/146-85. Headaches, lip numbness that is mild, and some chest discomfort when her BP is high.    Pt experiencing some leg cramps when she goes to bed.     She has no shortness of breath, edema, dizziness/lightheadedness, or palpitations.    Review of Systems  Review of Systems   Constitutional:  Positive for malaise/fatigue. Negative for chills, fever and weight loss.   Eyes:  Positive for blurred vision (glaucoma).   Respiratory:  Positive for shortness of breath. Negative for cough.    Cardiovascular:  Positive for palpitations (twice in the past month). Negative for chest pain, orthopnea, claudication and leg swelling.   Neurological:  Positive for dizziness (occasional), tingling and headaches. Negative for focal weakness, seizures and loss of consciousness.   Endo/Heme/Allergies:  Does not bruise/bleed easily.       Past Medical History   has a past medical history of COPD (chronic obstructive pulmonary disease) (HCC), Hypertension, Kidney disease, and Substance abuse (HCC).    Surgical History   has no past surgical history on file.     Family History  family history is not on file.   Family history reviewed with patient.     Social History     Socioeconomic History    Marital status: Single   Tobacco Use    Smoking status: Every Day     Packs/day: 0.50     Types: Cigarettes    Smokeless tobacco: Never   Vaping Use    Vaping Use: Every day    Substances: Nicotine, THC    Substance and Sexual Activity    Alcohol use: Yes     Alcohol/week: 0.6 oz     Types: 1 Glasses of wine per week     Comment: once    Drug use: Yes     Types: Marijuana, Inhaled        Allergies  No Known Allergies    Medications    Current Outpatient Medications:     amLODIPine (NORVASC) 10 MG Tab, Take 1 Tablet by mouth every day., Disp: , Rfl:     labetalol (NORMODYNE) 200 MG Tab, Take 1 Tablet by mouth 2 times a day., Disp: 200 Tablet, Rfl: 3    losartan-hydrochlorothiazide (HYZAAR) 100-25 MG per tablet, Take 1 Tablet by mouth every day., Disp: 100 Tablet, Rfl: 3    simvastatin (ZOCOR) 40 MG Tab, Take 1 Tablet by mouth every evening., Disp: 100 Tablet, Rfl: 3    metoprolol SR (TOPROL XL) 50 MG TABLET SR 24 HR, Take 1 tablet every day by oral route for 30 days. (Patient not taking: Reported on 7/7/2023), Disp: , Rfl:     metoprolol SR (TOPROL XL) 25 MG TABLET SR 24 HR, Take 1 Tablet by mouth every day. (Patient not taking: Reported on 7/7/2023), Disp: , Rfl:     cyclobenzaprine (FLEXERIL) 10 mg Tab, Take 1 Tablet by mouth 2 times a day. (Patient not taking: Reported on 7/7/2023), Disp: , Rfl:     amLODIPine (NORVASC) 5 MG Tab, Take 1 Tablet by mouth every evening. (Patient not taking: Reported on 7/7/2023), Disp: 100 Tablet, Rfl: 3    albuterol 108 (90 Base) MCG/ACT Aero Soln inhalation aerosol, Inhale 2 Puffs every four hours as needed. (Patient not taking: Reported on 7/7/2023), Disp: 8.5 g, Rfl: 0     Physical Exam  Pulse:  [80] 80  Resp:  [18] 18  BP: (160)/(90) 160/90  SpO2:  [93 %] 93 %    Physical Exam  Constitutional:       General: She is not in acute distress.     Appearance: Normal appearance. She is normal weight.   HENT:      Head: Normocephalic and atraumatic.   Cardiovascular:      Rate and Rhythm: Normal rate and regular rhythm.      Pulses: Normal pulses.      Heart sounds: Murmur heard.      No friction rub. No gallop.   Pulmonary:      Effort: Pulmonary effort is normal. No respiratory  distress.      Breath sounds: Normal breath sounds. No stridor. No wheezing, rhonchi or rales.   Musculoskeletal:         General: No swelling.   Skin:     General: Skin is warm and dry.      Capillary Refill: Capillary refill takes less than 2 seconds.   Neurological:      General: No focal deficit present.      Mental Status: She is alert and oriented to person, place, and time.   Psychiatric:         Mood and Affect: Mood normal.         Behavior: Behavior normal.     Assessment/Plan:    1. HTN  -poor control  -cont labetalol 200 mg BID, losartan-HCTZ 100-25 QAM  -incr amlodipine to 10 mg QPM  -keep BP log daily  -if SBP <130/80 consistently  -ordered catecholamine and metanephrine workup for review, sister had pheochromocytoma  -consider ordering stress imaging  -RTC in 1 week for BP check, then in 1 month for f/u    2. CVA  -follow with PCP/neurology  -not taking asa or plavix     3. CKD  -repeat labs     4. HLD  -cont statin  -repeat labs     5. Drug abuse and cigarette smoking  -8 cigarettes per day  -cessation recommended  -she will work on this, no resources wanted at this time

## 2023-07-07 NOTE — PROGRESS NOTES
Chief Complaint   Patient presents with    Chronic Kidney Disease    HTN (Controlled)    Nicotine Dependence     Chanel Rangel is a 53 y.o. female who presents today for follow up on BP review.    Hx of meth/marijuana abuse, COPD with current cigarette smoking, CKD, HLD, HA, and recent cortical hemorrhage of L cerebral hemisphere due to HTN.    She presents today alone. She has children that are grown up. She has no spouse or significant other.    She has been compliant with her medications. She didn't get her labs.    She still has high BP readings.    She has no shortness of breath, edema, dizziness/lightheadedness, or palpitations.    Past Medical History:   Diagnosis Date    COPD (chronic obstructive pulmonary disease) (HCC)     Hypertension     Kidney disease     Substance abuse (HCC)      History reviewed. No pertinent surgical history.  History reviewed. No pertinent family history.  Social History     Socioeconomic History    Marital status: Single     Spouse name: Not on file    Number of children: Not on file    Years of education: Not on file    Highest education level: Not on file   Occupational History    Not on file   Tobacco Use    Smoking status: Every Day     Packs/day: 0.50     Types: Cigarettes    Smokeless tobacco: Never   Vaping Use    Vaping Use: Every day    Substances: Nicotine, THC   Substance and Sexual Activity    Alcohol use: Yes     Alcohol/week: 0.6 oz     Types: 1 Glasses of wine per week     Comment: once    Drug use: Yes     Types: Marijuana, Inhaled    Sexual activity: Not on file   Other Topics Concern    Not on file   Social History Narrative    Not on file     Social Determinants of Health     Financial Resource Strain: Not on file   Food Insecurity: Not on file   Transportation Needs: Not on file   Physical Activity: Not on file   Stress: Not on file   Social Connections: Not on file   Intimate Partner Violence: Not on file   Housing Stability: Not on file     No  Known Allergies  Outpatient Encounter Medications as of 7/7/2023   Medication Sig Dispense Refill    amLODIPine (NORVASC) 10 MG Tab Take 1 Tablet by mouth every day.      hydrALAZINE (APRESOLINE) 25 MG Tab Take 1 Tablet by mouth 2 times a day. 200 Tablet 3    labetalol (NORMODYNE) 200 MG Tab Take 1 Tablet by mouth 2 times a day. 200 Tablet 3    losartan-hydrochlorothiazide (HYZAAR) 100-25 MG per tablet Take 1 Tablet by mouth every day. 100 Tablet 3    simvastatin (ZOCOR) 40 MG Tab Take 1 Tablet by mouth every evening. 100 Tablet 3    [DISCONTINUED] metoprolol SR (TOPROL XL) 50 MG TABLET SR 24 HR Take 1 tablet every day by oral route for 30 days. (Patient not taking: Reported on 7/7/2023)      [DISCONTINUED] metoprolol SR (TOPROL XL) 25 MG TABLET SR 24 HR Take 1 Tablet by mouth every day. (Patient not taking: Reported on 7/7/2023)      [DISCONTINUED] cyclobenzaprine (FLEXERIL) 10 mg Tab Take 1 Tablet by mouth 2 times a day. (Patient not taking: Reported on 7/7/2023)      [DISCONTINUED] amLODIPine (NORVASC) 5 MG Tab Take 1 Tablet by mouth every evening. (Patient not taking: Reported on 7/7/2023) 100 Tablet 3    albuterol 108 (90 Base) MCG/ACT Aero Soln inhalation aerosol Inhale 2 Puffs every four hours as needed. (Patient not taking: Reported on 7/7/2023) 8.5 g 0     No facility-administered encounter medications on file as of 7/7/2023.     Review of Systems   Constitutional:  Negative for fever and malaise/fatigue.   Respiratory:  Negative for cough and shortness of breath.    Cardiovascular:  Negative for chest pain, palpitations, orthopnea, claudication, leg swelling and PND.   Gastrointestinal:  Negative for abdominal pain.   Genitourinary:  Negative for urgency.   Musculoskeletal:  Negative for myalgias.   Neurological:  Negative for dizziness and headaches.              Objective     BP (!) 160/90 (BP Location: Left arm, Patient Position: Sitting, BP Cuff Size: Adult)   Pulse 80   Resp 18   Ht 1.689 m (5'  "6.5\")   Wt 81.6 kg (180 lb)   SpO2 93%   BMI 28.62 kg/m²     Physical Exam  Vitals and nursing note reviewed.   Constitutional:       Appearance: Normal appearance. She is well-developed and normal weight.   HENT:      Head: Normocephalic and atraumatic.   Neck:      Vascular: No JVD. Carotid bruit:  .  Cardiovascular:      Rate and Rhythm: Normal rate and regular rhythm.      Pulses: Normal pulses.      Heart sounds: Normal heart sounds.   Pulmonary:      Effort: Pulmonary effort is normal.      Breath sounds: Normal breath sounds.   Musculoskeletal:         General: Normal range of motion.   Skin:     General: Skin is warm and dry.      Capillary Refill: Capillary refill takes less than 2 seconds.   Neurological:      General: No focal deficit present.      Mental Status: She is alert and oriented to person, place, and time. Mental status is at baseline.   Psychiatric:         Mood and Affect: Mood normal.         Behavior: Behavior normal.         Thought Content: Thought content normal.         Judgment: Judgment normal.                Assessment & Plan     1. Essential hypertension  CATECHOLAMINES PLASMA FRACTIONATED    METANEPHRINE FRA QN 24 HR    METANEPHRINES PLASMA    hydrALAZINE (APRESOLINE) 25 MG Tab      2. Chronic kidney disease, unspecified CKD stage        3. 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 Other hyperlipidemia            Medical Decision Making: Today's Assessment/Status/Plan:      1. HTN  -poor control due to non-compliance at times  -cont labetalol 200 mg BID, losartan-HCTZ 100-25 QAM, amlodipine 10 mg QPM  -start hydralazine 25 mg BID  -keep BP log daily  -if SBP <130/80 consistently  -refilled all medications to pharmacy  -recommend consider stress imaging once BP controlled  -sister had pheochromocytoma, order catecholamine and metanephrine workup for review, she never got these done-obtain with next set of labs    2. CVA  -follow with " neurology    3. CKD  -repeat labs pending    4. HLD  -statin  -repeat labs pending    5. Drug abuse and cigarette smoking  -cessation recommended  -she will work on this, no resources wanted at this time    Patient is to follow up with Magda CHAVARRIA in 1 month with BP log and labs, 1 week with BP check.

## 2023-08-17 ENCOUNTER — APPOINTMENT (OUTPATIENT)
Dept: RADIOLOGY | Facility: MEDICAL CENTER | Age: 53
DRG: 305 | End: 2023-08-17
Attending: STUDENT IN AN ORGANIZED HEALTH CARE EDUCATION/TRAINING PROGRAM
Payer: COMMERCIAL

## 2023-08-17 ENCOUNTER — OFFICE VISIT (OUTPATIENT)
Dept: CARDIOLOGY | Facility: MEDICAL CENTER | Age: 53
DRG: 305 | End: 2023-08-17
Attending: NURSE PRACTITIONER
Payer: COMMERCIAL

## 2023-08-17 ENCOUNTER — HOSPITAL ENCOUNTER (INPATIENT)
Facility: MEDICAL CENTER | Age: 53
LOS: 1 days | DRG: 305 | End: 2023-08-18
Attending: STUDENT IN AN ORGANIZED HEALTH CARE EDUCATION/TRAINING PROGRAM | Admitting: STUDENT IN AN ORGANIZED HEALTH CARE EDUCATION/TRAINING PROGRAM
Payer: COMMERCIAL

## 2023-08-17 ENCOUNTER — HOSPITAL ENCOUNTER (OUTPATIENT)
Dept: LAB | Facility: MEDICAL CENTER | Age: 53
DRG: 305 | End: 2023-08-17
Attending: NURSE PRACTITIONER
Payer: COMMERCIAL

## 2023-08-17 VITALS
RESPIRATION RATE: 18 BRPM | HEIGHT: 61 IN | OXYGEN SATURATION: 98 % | BODY MASS INDEX: 34.93 KG/M2 | WEIGHT: 185 LBS | SYSTOLIC BLOOD PRESSURE: 204 MMHG | HEART RATE: 82 BPM | DIASTOLIC BLOOD PRESSURE: 110 MMHG

## 2023-08-17 DIAGNOSIS — I10 ESSENTIAL HYPERTENSION: ICD-10-CM

## 2023-08-17 DIAGNOSIS — E78.49 OTHER HYPERLIPIDEMIA: ICD-10-CM

## 2023-08-17 DIAGNOSIS — I61.1 NONTRAUMATIC CORTICAL HEMORRHAGE OF LEFT CEREBRAL HEMISPHERE (HCC): ICD-10-CM

## 2023-08-17 DIAGNOSIS — R20.2 PARESTHESIAS: ICD-10-CM

## 2023-08-17 DIAGNOSIS — N18.9 CHRONIC KIDNEY DISEASE, UNSPECIFIED CKD STAGE: ICD-10-CM

## 2023-08-17 PROBLEM — N18.31 STAGE 3A CHRONIC KIDNEY DISEASE: Status: ACTIVE | Noted: 2021-08-25

## 2023-08-17 PROBLEM — Z86.73 HISTORY OF STROKE: Status: ACTIVE | Noted: 2023-08-17

## 2023-08-17 PROBLEM — I16.1 HYPERTENSIVE EMERGENCY: Status: ACTIVE | Noted: 2023-08-17

## 2023-08-17 PROBLEM — R53.1 ACUTE RIGHT-SIDED WEAKNESS: Status: ACTIVE | Noted: 2023-08-17

## 2023-08-17 LAB
ABO GROUP BLD: NORMAL
ALBUMIN SERPL BCP-MCNC: 4.6 G/DL (ref 3.2–4.9)
ALBUMIN/GLOB SERPL: 1.6 G/DL
ALP SERPL-CCNC: 77 U/L (ref 30–99)
ALT SERPL-CCNC: 22 U/L (ref 2–50)
ANION GAP SERPL CALC-SCNC: 10 MMOL/L (ref 7–16)
ANION GAP SERPL CALC-SCNC: 12 MMOL/L (ref 7–16)
APTT PPP: 28.5 SEC (ref 24.7–36)
AST SERPL-CCNC: 21 U/L (ref 12–45)
BASOPHILS # BLD AUTO: 0.9 % (ref 0–1.8)
BASOPHILS # BLD: 0.07 K/UL (ref 0–0.12)
BILIRUB SERPL-MCNC: 0.4 MG/DL (ref 0.1–1.5)
BLD GP AB SCN SERPL QL: NORMAL
BUN SERPL-MCNC: 19 MG/DL (ref 8–22)
BUN SERPL-MCNC: 21 MG/DL (ref 8–22)
CALCIUM ALBUM COR SERPL-MCNC: 9.2 MG/DL (ref 8.5–10.5)
CALCIUM SERPL-MCNC: 9 MG/DL (ref 8.5–10.5)
CALCIUM SERPL-MCNC: 9.7 MG/DL (ref 8.5–10.5)
CHLORIDE SERPL-SCNC: 100 MMOL/L (ref 96–112)
CHLORIDE SERPL-SCNC: 102 MMOL/L (ref 96–112)
CHOLEST SERPL-MCNC: 175 MG/DL (ref 100–199)
CO2 SERPL-SCNC: 27 MMOL/L (ref 20–33)
CO2 SERPL-SCNC: 28 MMOL/L (ref 20–33)
CREAT SERPL-MCNC: 1.17 MG/DL (ref 0.5–1.4)
CREAT SERPL-MCNC: 1.3 MG/DL (ref 0.5–1.4)
EKG IMPRESSION: NORMAL
EOSINOPHIL # BLD AUTO: 0.23 K/UL (ref 0–0.51)
EOSINOPHIL NFR BLD: 2.9 % (ref 0–6.9)
ERYTHROCYTE [DISTWIDTH] IN BLOOD BY AUTOMATED COUNT: 41.5 FL (ref 35.9–50)
FASTING STATUS PATIENT QL REPORTED: NORMAL
GFR SERPLBLD CREATININE-BSD FMLA CKD-EPI: 49 ML/MIN/1.73 M 2
GFR SERPLBLD CREATININE-BSD FMLA CKD-EPI: 56 ML/MIN/1.73 M 2
GLOBULIN SER CALC-MCNC: 2.8 G/DL (ref 1.9–3.5)
GLUCOSE SERPL-MCNC: 120 MG/DL (ref 65–99)
GLUCOSE SERPL-MCNC: 93 MG/DL (ref 65–99)
HCT VFR BLD AUTO: 45.8 % (ref 37–47)
HDLC SERPL-MCNC: 85 MG/DL
HGB BLD-MCNC: 15.6 G/DL (ref 12–16)
IMM GRANULOCYTES # BLD AUTO: 0.01 K/UL (ref 0–0.11)
IMM GRANULOCYTES NFR BLD AUTO: 0.1 % (ref 0–0.9)
INR PPP: 0.97 (ref 0.87–1.13)
LDLC SERPL CALC-MCNC: 80 MG/DL
LYMPHOCYTES # BLD AUTO: 2.29 K/UL (ref 1–4.8)
LYMPHOCYTES NFR BLD: 29.2 % (ref 22–41)
MCH RBC QN AUTO: 28.9 PG (ref 27–33)
MCHC RBC AUTO-ENTMCNC: 34.1 G/DL (ref 32.2–35.5)
MCV RBC AUTO: 85 FL (ref 81.4–97.8)
MONOCYTES # BLD AUTO: 0.44 K/UL (ref 0–0.85)
MONOCYTES NFR BLD AUTO: 5.6 % (ref 0–13.4)
NEUTROPHILS # BLD AUTO: 4.81 K/UL (ref 1.82–7.42)
NEUTROPHILS NFR BLD: 61.3 % (ref 44–72)
NRBC # BLD AUTO: 0 K/UL
NRBC BLD-RTO: 0 /100 WBC (ref 0–0.2)
PLATELET # BLD AUTO: 249 K/UL (ref 164–446)
PMV BLD AUTO: 9.5 FL (ref 9–12.9)
POTASSIUM SERPL-SCNC: 3.6 MMOL/L (ref 3.6–5.5)
POTASSIUM SERPL-SCNC: 3.7 MMOL/L (ref 3.6–5.5)
PROT SERPL-MCNC: 7.4 G/DL (ref 6–8.2)
PROTHROMBIN TIME: 12.8 SEC (ref 12–14.6)
RBC # BLD AUTO: 5.39 M/UL (ref 4.2–5.4)
RH BLD: NORMAL
SODIUM SERPL-SCNC: 139 MMOL/L (ref 135–145)
SODIUM SERPL-SCNC: 140 MMOL/L (ref 135–145)
TRIGL SERPL-MCNC: 48 MG/DL (ref 0–149)
TROPONIN T SERPL-MCNC: 9 NG/L (ref 6–19)
WBC # BLD AUTO: 7.9 K/UL (ref 4.8–10.8)

## 2023-08-17 PROCEDURE — 80061 LIPID PANEL: CPT

## 2023-08-17 PROCEDURE — 93005 ELECTROCARDIOGRAM TRACING: CPT

## 2023-08-17 PROCEDURE — 96375 TX/PRO/DX INJ NEW DRUG ADDON: CPT

## 2023-08-17 PROCEDURE — 86900 BLOOD TYPING SEROLOGIC ABO: CPT

## 2023-08-17 PROCEDURE — 700102 HCHG RX REV CODE 250 W/ 637 OVERRIDE(OP): Performed by: STUDENT IN AN ORGANIZED HEALTH CARE EDUCATION/TRAINING PROGRAM

## 2023-08-17 PROCEDURE — 85025 COMPLETE CBC W/AUTO DIFF WBC: CPT

## 2023-08-17 PROCEDURE — 0042T CT-CEREBRAL PERFUSION ANALYSIS: CPT

## 2023-08-17 PROCEDURE — 84484 ASSAY OF TROPONIN QUANT: CPT

## 2023-08-17 PROCEDURE — 3080F DIAST BP >= 90 MM HG: CPT | Performed by: NURSE PRACTITIONER

## 2023-08-17 PROCEDURE — 99254 IP/OBS CNSLTJ NEW/EST MOD 60: CPT | Performed by: PSYCHIATRY & NEUROLOGY

## 2023-08-17 PROCEDURE — 96376 TX/PRO/DX INJ SAME DRUG ADON: CPT

## 2023-08-17 PROCEDURE — 700111 HCHG RX REV CODE 636 W/ 250 OVERRIDE (IP): Performed by: STUDENT IN AN ORGANIZED HEALTH CARE EDUCATION/TRAINING PROGRAM

## 2023-08-17 PROCEDURE — 700117 HCHG RX CONTRAST REV CODE 255: Performed by: STUDENT IN AN ORGANIZED HEALTH CARE EDUCATION/TRAINING PROGRAM

## 2023-08-17 PROCEDURE — 99213 OFFICE O/P EST LOW 20 MIN: CPT | Performed by: NURSE PRACTITIONER

## 2023-08-17 PROCEDURE — 99406 BEHAV CHNG SMOKING 3-10 MIN: CPT

## 2023-08-17 PROCEDURE — 36415 COLL VENOUS BLD VENIPUNCTURE: CPT

## 2023-08-17 PROCEDURE — 86901 BLOOD TYPING SEROLOGIC RH(D): CPT

## 2023-08-17 PROCEDURE — 82384 ASSAY THREE CATECHOLAMINES: CPT

## 2023-08-17 PROCEDURE — 80048 BASIC METABOLIC PNL TOTAL CA: CPT

## 2023-08-17 PROCEDURE — 70498 CT ANGIOGRAPHY NECK: CPT

## 2023-08-17 PROCEDURE — 85652 RBC SED RATE AUTOMATED: CPT

## 2023-08-17 PROCEDURE — 86850 RBC ANTIBODY SCREEN: CPT

## 2023-08-17 PROCEDURE — 83835 ASSAY OF METANEPHRINES: CPT

## 2023-08-17 PROCEDURE — A9270 NON-COVERED ITEM OR SERVICE: HCPCS | Performed by: STUDENT IN AN ORGANIZED HEALTH CARE EDUCATION/TRAINING PROGRAM

## 2023-08-17 PROCEDURE — 99285 EMERGENCY DEPT VISIT HI MDM: CPT

## 2023-08-17 PROCEDURE — 85730 THROMBOPLASTIN TIME PARTIAL: CPT

## 2023-08-17 PROCEDURE — 71045 X-RAY EXAM CHEST 1 VIEW: CPT

## 2023-08-17 PROCEDURE — 3077F SYST BP >= 140 MM HG: CPT | Performed by: NURSE PRACTITIONER

## 2023-08-17 PROCEDURE — 96374 THER/PROPH/DIAG INJ IV PUSH: CPT

## 2023-08-17 PROCEDURE — 70496 CT ANGIOGRAPHY HEAD: CPT

## 2023-08-17 PROCEDURE — 99291 CRITICAL CARE FIRST HOUR: CPT | Mod: 25 | Performed by: STUDENT IN AN ORGANIZED HEALTH CARE EDUCATION/TRAINING PROGRAM

## 2023-08-17 PROCEDURE — 306637 HCHG MISC ORTHO ITEM RC 0274

## 2023-08-17 PROCEDURE — 70450 CT HEAD/BRAIN W/O DYE: CPT

## 2023-08-17 PROCEDURE — 80053 COMPREHEN METABOLIC PANEL: CPT

## 2023-08-17 PROCEDURE — 99214 OFFICE O/P EST MOD 30 MIN: CPT | Performed by: NURSE PRACTITIONER

## 2023-08-17 PROCEDURE — 99406 BEHAV CHNG SMOKING 3-10 MIN: CPT | Performed by: STUDENT IN AN ORGANIZED HEALTH CARE EDUCATION/TRAINING PROGRAM

## 2023-08-17 PROCEDURE — 85610 PROTHROMBIN TIME: CPT

## 2023-08-17 RX ORDER — ACETAMINOPHEN 325 MG/1
650 TABLET ORAL EVERY 6 HOURS PRN
Status: DISCONTINUED | OUTPATIENT
Start: 2023-08-17 | End: 2023-08-18 | Stop reason: HOSPADM

## 2023-08-17 RX ORDER — PROCHLORPERAZINE EDISYLATE 5 MG/ML
10 INJECTION INTRAMUSCULAR; INTRAVENOUS ONCE
Status: COMPLETED | OUTPATIENT
Start: 2023-08-17 | End: 2023-08-17

## 2023-08-17 RX ORDER — NICOTINE 21 MG/24HR
14 PATCH, TRANSDERMAL 24 HOURS TRANSDERMAL
Status: DISCONTINUED | OUTPATIENT
Start: 2023-08-18 | End: 2023-08-18 | Stop reason: HOSPADM

## 2023-08-17 RX ORDER — HYDROCHLOROTHIAZIDE 25 MG/1
25 TABLET ORAL
Status: DISCONTINUED | OUTPATIENT
Start: 2023-08-18 | End: 2023-08-18 | Stop reason: HOSPADM

## 2023-08-17 RX ORDER — AMOXICILLIN 250 MG
2 CAPSULE ORAL 2 TIMES DAILY
Status: DISCONTINUED | OUTPATIENT
Start: 2023-08-17 | End: 2023-08-18 | Stop reason: HOSPADM

## 2023-08-17 RX ORDER — ASPIRIN 325 MG
325 TABLET ORAL DAILY
Status: DISCONTINUED | OUTPATIENT
Start: 2023-08-18 | End: 2023-08-18 | Stop reason: HOSPADM

## 2023-08-17 RX ORDER — DIPHENHYDRAMINE HYDROCHLORIDE 50 MG/ML
25 INJECTION INTRAMUSCULAR; INTRAVENOUS ONCE
Status: COMPLETED | OUTPATIENT
Start: 2023-08-17 | End: 2023-08-17

## 2023-08-17 RX ORDER — LABETALOL HYDROCHLORIDE 5 MG/ML
10 INJECTION, SOLUTION INTRAVENOUS
Status: DISCONTINUED | OUTPATIENT
Start: 2023-08-17 | End: 2023-08-18 | Stop reason: HOSPADM

## 2023-08-17 RX ORDER — ATORVASTATIN CALCIUM 40 MG/1
40 TABLET, FILM COATED ORAL EVERY EVENING
Status: DISCONTINUED | OUTPATIENT
Start: 2023-08-18 | End: 2023-08-18 | Stop reason: HOSPADM

## 2023-08-17 RX ORDER — LABETALOL HYDROCHLORIDE 5 MG/ML
20 INJECTION, SOLUTION INTRAVENOUS ONCE
Status: COMPLETED | OUTPATIENT
Start: 2023-08-17 | End: 2023-08-17

## 2023-08-17 RX ORDER — SODIUM CHLORIDE 9 MG/ML
500 INJECTION, SOLUTION INTRAVENOUS
Status: DISCONTINUED | OUTPATIENT
Start: 2023-08-17 | End: 2023-08-18 | Stop reason: HOSPADM

## 2023-08-17 RX ORDER — HYDRALAZINE HYDROCHLORIDE 25 MG/1
25 TABLET, FILM COATED ORAL 2 TIMES DAILY
Status: DISCONTINUED | OUTPATIENT
Start: 2023-08-17 | End: 2023-08-18 | Stop reason: HOSPADM

## 2023-08-17 RX ORDER — AMLODIPINE BESYLATE 10 MG/1
10 TABLET ORAL
Qty: 100 TABLET | Refills: 3 | Status: SHIPPED | OUTPATIENT
Start: 2023-08-17

## 2023-08-17 RX ORDER — AMLODIPINE BESYLATE 5 MG/1
10 TABLET ORAL ONCE
Status: COMPLETED | OUTPATIENT
Start: 2023-08-17 | End: 2023-08-17

## 2023-08-17 RX ORDER — LOSARTAN POTASSIUM AND HYDROCHLOROTHIAZIDE 25; 100 MG/1; MG/1
1 TABLET ORAL
Status: DISCONTINUED | OUTPATIENT
Start: 2023-08-17 | End: 2023-08-17

## 2023-08-17 RX ORDER — POLYETHYLENE GLYCOL 3350 17 G/17G
1 POWDER, FOR SOLUTION ORAL
Status: DISCONTINUED | OUTPATIENT
Start: 2023-08-17 | End: 2023-08-18 | Stop reason: HOSPADM

## 2023-08-17 RX ORDER — HYDRALAZINE HYDROCHLORIDE 20 MG/ML
10 INJECTION INTRAMUSCULAR; INTRAVENOUS
Status: DISCONTINUED | OUTPATIENT
Start: 2023-08-17 | End: 2023-08-18 | Stop reason: HOSPADM

## 2023-08-17 RX ORDER — LOSARTAN POTASSIUM 50 MG/1
100 TABLET ORAL
Status: DISCONTINUED | OUTPATIENT
Start: 2023-08-18 | End: 2023-08-18 | Stop reason: HOSPADM

## 2023-08-17 RX ORDER — AMLODIPINE BESYLATE 5 MG/1
10 TABLET ORAL DAILY
Status: DISCONTINUED | OUTPATIENT
Start: 2023-08-18 | End: 2023-08-18 | Stop reason: HOSPADM

## 2023-08-17 RX ORDER — LABETALOL 100 MG/1
200 TABLET, FILM COATED ORAL 2 TIMES DAILY
Status: DISCONTINUED | OUTPATIENT
Start: 2023-08-17 | End: 2023-08-18 | Stop reason: HOSPADM

## 2023-08-17 RX ORDER — BISACODYL 10 MG
10 SUPPOSITORY, RECTAL RECTAL
Status: DISCONTINUED | OUTPATIENT
Start: 2023-08-17 | End: 2023-08-18 | Stop reason: HOSPADM

## 2023-08-17 RX ADMIN — IOHEXOL 60 ML: 350 INJECTION, SOLUTION INTRAVENOUS at 21:35

## 2023-08-17 RX ADMIN — AMLODIPINE BESYLATE 10 MG: 5 TABLET ORAL at 23:38

## 2023-08-17 RX ADMIN — IOHEXOL 40 ML: 350 INJECTION, SOLUTION INTRAVENOUS at 21:34

## 2023-08-17 RX ADMIN — DIPHENHYDRAMINE HYDROCHLORIDE 25 MG: 50 INJECTION, SOLUTION INTRAMUSCULAR; INTRAVENOUS at 23:05

## 2023-08-17 RX ADMIN — LABETALOL HYDROCHLORIDE 20 MG: 5 INJECTION INTRAVENOUS at 23:10

## 2023-08-17 RX ADMIN — PROCHLORPERAZINE EDISYLATE 10 MG: 5 INJECTION INTRAMUSCULAR; INTRAVENOUS at 23:01

## 2023-08-17 ASSESSMENT — ENCOUNTER SYMPTOMS
DIARRHEA: 0
SHORTNESS OF BREATH: 0
ABDOMINAL PAIN: 0
FEVER: 0
VOMITING: 0
DIZZINESS: 0
PALPITATIONS: 0
HEADACHES: 1
NAUSEA: 0
SHORTNESS OF BREATH: 0
ORTHOPNEA: 0
ABDOMINAL PAIN: 0
CLAUDICATION: 0
WEAKNESS: 1
CHILLS: 0
HEADACHES: 1
COUGH: 0
MYALGIAS: 0
FEVER: 0
SENSORY CHANGE: 1
PND: 0
COUGH: 0

## 2023-08-17 ASSESSMENT — FIBROSIS 4 INDEX: FIB4 SCORE: 1.24

## 2023-08-17 NOTE — PATIENT INSTRUCTIONS
Take your medications!    If you get home and your top # of BP >200 and not feeling well, go to ER.    Finish lab testing for urine.    Refilled amlodipine 10 mg once a day.    Bring BP log to next apt to review with ME :)    BP goal 110-130/60-80.    Continue same BP medications.

## 2023-08-17 NOTE — PROGRESS NOTES
No chief complaint on file.    Chanel Rangel is a 53 y.o. female who presents today for follow up on BP review.    Hx of meth/marijuana abuse, COPD with current cigarette smoking, CKD, HLD, HA, and recent cortical hemorrhage of L cerebral hemisphere due to HTN.    She presents today alone. She has children that are grown up. She has a new BF and he has been trying to help her with her BP medication compliance.    Her urine testing is pending as well as send out labs.    She forgot her medications today and now she is having a headache and fatigue.    She still has high BP readings in the 140's at home and forgot her BP log from home.    She has no shortness of breath, edema, dizziness/lightheadedness, or palpitations.    Past Medical History:   Diagnosis Date    COPD (chronic obstructive pulmonary disease) (HCC)     Hypertension     Kidney disease     Substance abuse (HCC)      History reviewed. No pertinent surgical history.  History reviewed. No pertinent family history.  Social History     Socioeconomic History    Marital status: Single     Spouse name: Not on file    Number of children: Not on file    Years of education: Not on file    Highest education level: Not on file   Occupational History    Not on file   Tobacco Use    Smoking status: Every Day     Packs/day: .5     Types: Cigarettes    Smokeless tobacco: Never   Vaping Use    Vaping Use: Every day    Substances: Nicotine, THC   Substance and Sexual Activity    Alcohol use: Yes     Alcohol/week: 0.6 oz     Types: 1 Glasses of wine per week     Comment: once    Drug use: Yes     Types: Marijuana, Inhaled    Sexual activity: Not on file   Other Topics Concern    Not on file   Social History Narrative    Not on file     Social Determinants of Health     Financial Resource Strain: Not on file   Food Insecurity: Not on file   Transportation Needs: Not on file   Physical Activity: Not on file   Stress: Not on file   Social Connections: Not on  "file   Intimate Partner Violence: Not on file   Housing Stability: Not on file     No Known Allergies  Outpatient Encounter Medications as of 8/17/2023   Medication Sig Dispense Refill    amLODIPine (NORVASC) 10 MG Tab Take 1 Tablet by mouth every day.      hydrALAZINE (APRESOLINE) 25 MG Tab Take 1 Tablet by mouth 2 times a day. 200 Tablet 3    labetalol (NORMODYNE) 200 MG Tab Take 1 Tablet by mouth 2 times a day. 200 Tablet 3    losartan-hydrochlorothiazide (HYZAAR) 100-25 MG per tablet Take 1 Tablet by mouth every day. 100 Tablet 3    simvastatin (ZOCOR) 40 MG Tab Take 1 Tablet by mouth every evening. 100 Tablet 3    [DISCONTINUED] albuterol 108 (90 Base) MCG/ACT Aero Soln inhalation aerosol Inhale 2 Puffs every four hours as needed. (Patient not taking: Reported on 7/7/2023) 8.5 g 0     No facility-administered encounter medications on file as of 8/17/2023.     Review of Systems   Constitutional:  Positive for malaise/fatigue. Negative for fever.        Feeling poorly today with high BP   Respiratory:  Negative for cough and shortness of breath.    Cardiovascular:  Negative for chest pain, palpitations, orthopnea, claudication, leg swelling and PND.   Gastrointestinal:  Negative for abdominal pain.   Genitourinary:  Negative for urgency.   Musculoskeletal:  Negative for myalgias.   Neurological:  Positive for headaches. Negative for dizziness.        With high BP              Objective     BP (!) 204/110 (BP Location: Left arm, Patient Position: Sitting, BP Cuff Size: Adult)   Pulse 82   Resp 18   Ht 1.541 m (5' 0.65\")   Wt 83.9 kg (185 lb)   SpO2 98%   BMI 35.36 kg/m²     Physical Exam  Vitals and nursing note reviewed.   Constitutional:       Appearance: Normal appearance. She is well-developed and normal weight.   HENT:      Head: Normocephalic and atraumatic.   Neck:      Vascular: No JVD. Carotid bruit:  .  Cardiovascular:      Rate and Rhythm: Normal rate and regular rhythm.      Pulses: Normal " pulses.      Heart sounds: Normal heart sounds.   Pulmonary:      Effort: Pulmonary effort is normal.      Breath sounds: Normal breath sounds.   Musculoskeletal:         General: Normal range of motion.   Skin:     General: Skin is warm and dry.      Capillary Refill: Capillary refill takes less than 2 seconds.   Neurological:      General: No focal deficit present.      Mental Status: She is alert and oriented to person, place, and time. Mental status is at baseline.   Psychiatric:         Mood and Affect: Mood normal.         Behavior: Behavior normal.         Thought Content: Thought content normal.         Judgment: Judgment normal.                Assessment & Plan     1. Essential hypertension        2. Chronic kidney disease, unspecified CKD stage        3. 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 Other hyperlipidemia            Medical Decision Making: Today's Assessment/Status/Plan:      1. HTN  -poor control due to non-compliance at times  -cont labetalol 200 mg BID, losartan-HCTZ 100-25 QAM, amlodipine 10 mg QPM, and hydralazine 25 mg BID  -keep BP log daily  -if SBP <130/80 consistently  -refilled amlodipine  -recommend consider stress imaging once BP controlled  -sister had pheochromocytoma, pending catecholamine and metanephrine workup for review    2. CVA  -follow with neurology and PCP    3. CKD  -repeat labs pending    4. HLD  -statin  -good control, cont    5. Drug abuse and cigarette smoking  -cessation recommended  -she will work on this, no resources wanted at this time    Patient is to follow up with Magda CHAVARRIA in 1 month with BP log and labs.

## 2023-08-18 ENCOUNTER — APPOINTMENT (OUTPATIENT)
Dept: RADIOLOGY | Facility: MEDICAL CENTER | Age: 53
DRG: 305 | End: 2023-08-18
Attending: STUDENT IN AN ORGANIZED HEALTH CARE EDUCATION/TRAINING PROGRAM
Payer: COMMERCIAL

## 2023-08-18 VITALS
HEART RATE: 69 BPM | DIASTOLIC BLOOD PRESSURE: 85 MMHG | OXYGEN SATURATION: 97 % | RESPIRATION RATE: 20 BRPM | SYSTOLIC BLOOD PRESSURE: 179 MMHG | TEMPERATURE: 98.5 F

## 2023-08-18 LAB
ABO + RH BLD: NORMAL
AMPHET UR QL SCN: POSITIVE
BARBITURATES UR QL SCN: NEGATIVE
BENZODIAZ UR QL SCN: NEGATIVE
BZE UR QL SCN: POSITIVE
CANNABINOIDS UR QL SCN: POSITIVE
ERYTHROCYTE [SEDIMENTATION RATE] IN BLOOD BY WESTERGREN METHOD: 5 MM/HOUR (ref 0–25)
EST. AVERAGE GLUCOSE BLD GHB EST-MCNC: 120 MG/DL
FENTANYL UR QL: NEGATIVE
HBA1C MFR BLD: 5.8 % (ref 4–5.6)
METHADONE UR QL SCN: NEGATIVE
OPIATES UR QL SCN: NEGATIVE
OXYCODONE UR QL SCN: NEGATIVE
PCP UR QL SCN: NEGATIVE
PROPOXYPH UR QL SCN: NEGATIVE

## 2023-08-18 PROCEDURE — A9270 NON-COVERED ITEM OR SERVICE: HCPCS | Performed by: STUDENT IN AN ORGANIZED HEALTH CARE EDUCATION/TRAINING PROGRAM

## 2023-08-18 PROCEDURE — 700111 HCHG RX REV CODE 636 W/ 250 OVERRIDE (IP): Performed by: STUDENT IN AN ORGANIZED HEALTH CARE EDUCATION/TRAINING PROGRAM

## 2023-08-18 PROCEDURE — 70551 MRI BRAIN STEM W/O DYE: CPT

## 2023-08-18 PROCEDURE — 83036 HEMOGLOBIN GLYCOSYLATED A1C: CPT

## 2023-08-18 PROCEDURE — 99239 HOSP IP/OBS DSCHRG MGMT >30: CPT | Performed by: STUDENT IN AN ORGANIZED HEALTH CARE EDUCATION/TRAINING PROGRAM

## 2023-08-18 PROCEDURE — 700102 HCHG RX REV CODE 250 W/ 637 OVERRIDE(OP): Performed by: STUDENT IN AN ORGANIZED HEALTH CARE EDUCATION/TRAINING PROGRAM

## 2023-08-18 PROCEDURE — 80307 DRUG TEST PRSMV CHEM ANLYZR: CPT

## 2023-08-18 RX ORDER — HYDRALAZINE HYDROCHLORIDE 25 MG/1
25 TABLET, FILM COATED ORAL 3 TIMES DAILY
Qty: 30 TABLET | Refills: 1 | Status: SHIPPED | OUTPATIENT
Start: 2023-08-18 | End: 2023-09-19 | Stop reason: DRUGHIGH

## 2023-08-18 RX ADMIN — HYDRALAZINE HYDROCHLORIDE 25 MG: 25 TABLET, FILM COATED ORAL at 00:07

## 2023-08-18 RX ADMIN — LABETALOL HYDROCHLORIDE 200 MG: 100 TABLET, FILM COATED ORAL at 09:30

## 2023-08-18 RX ADMIN — HYDROCHLOROTHIAZIDE 25 MG: 25 TABLET ORAL at 06:19

## 2023-08-18 RX ADMIN — ASPIRIN 325 MG: 325 TABLET ORAL at 06:19

## 2023-08-18 RX ADMIN — LABETALOL HYDROCHLORIDE 200 MG: 100 TABLET, FILM COATED ORAL at 00:06

## 2023-08-18 RX ADMIN — LABETALOL HYDROCHLORIDE 10 MG: 5 INJECTION INTRAVENOUS at 01:01

## 2023-08-18 RX ADMIN — LOSARTAN POTASSIUM 100 MG: 50 TABLET, FILM COATED ORAL at 06:19

## 2023-08-18 RX ADMIN — HYDRALAZINE HYDROCHLORIDE 25 MG: 25 TABLET, FILM COATED ORAL at 08:00

## 2023-08-18 RX ADMIN — HYDRALAZINE HYDROCHLORIDE 10 MG: 20 INJECTION, SOLUTION INTRAMUSCULAR; INTRAVENOUS at 06:52

## 2023-08-18 RX ADMIN — AMLODIPINE BESYLATE 10 MG: 5 TABLET ORAL at 08:00

## 2023-08-18 NOTE — ED NOTES
Patient remains comfortable on gurney, no identifiable needs at this time. Equal chest rise and fall bilaterally, pt connected to cardiac monitor. Pending bed assignment. . Safety measures in place, call light within reach.

## 2023-08-18 NOTE — HOSPITAL COURSE
Heide Rangel is a 53 y.o. female who presented 8/17/2023 with slurred speech, which was resolved by the time of initial evaluation.  PMH of hypertension, CKD 3, hemorrhagic stroke, dyslipidemia, tobacco abuse, migraines, drug use.  Patient comes in complaining of slurred speech that began today, she also has been having numbness on the right side of her body for the past 2-3 days.  She is also complaining of disequilibrium and falling or feeling like she is about to fall for over a week now.  Patient reported missing some doses of her BP meds and her last meth/cocaine use being 1-2 days ago.  Family have blood pressure usually stays in the 160-180 systolic.  Patient's vitals were remarkable for elevated blood pressure of 204/110 which went up to 247/108  Patient's labs are relevant for UDS positive for amphetamines, cannabinoid and cocaine.  CT head, CTA head neck, CT cerebral perfusion and MRI unremarkable apart from chronic changes.    She was treated with IV labetalol and her home BP medications were resumed which improved her BP.  Upon my evaluation today her last blood pressure was 157/83.    Patient was managed with BP control IV labetalol and we resumed her home medications.  Her blood pressure improved.  Advised patient in length about being compliant with her BP meds, keeping a written log and stopping drug use completely.  Went over risks of drug use in detail.  Advised patient to follow-up with PCP with her routine BP log for adjustment of medications if needed.  We are resuming all her home BP medications, and have increased hydralazine from 25 twice daily to 3 times daily.    
none

## 2023-08-18 NOTE — ED NOTES
Pt placed on @L O2. Pt sleeping and O2 sats 87-89%. Pt on NC 2L & satting 97%.  Pt sleeping on hospital bed, expressing NAD at this time. Safety measures in place. Call light in reach, bed in low and locked positoins, 2side rails up

## 2023-08-18 NOTE — ED NOTES
Patient remains sleeping comfortable on hospital bed, no identifiable needs at this time. Equal chest rise and fall bilaterally, pt connected to cardiac monitor. Pending bed assignment. Safety measures in place, call light within reach.

## 2023-08-18 NOTE — ED NOTES
Pt to [facility] bed in NAD. Connected ot monitor, call light in reach and all appropriate safety measures in place

## 2023-08-18 NOTE — ED NOTES
Assist RN: Pt Blood pressures elevated, primary nurse made aware, labetalol PRN given as per MAR.   Vitals:    08/18/23 0047 08/18/23 0100 08/18/23 0102   BP: (!) 219/108 (!) 247/110 (!) 195/94   Pulse: 64 68 68

## 2023-08-18 NOTE — ED PROVIDER NOTES
ED Provider Note    CHIEF COMPLAINT  Chief Complaint   Patient presents with    Possible Stroke    Numbness    Slurred Speech     Pt to triage for slurred speech and N/T of right side. Per pt she has PMH of stroke and benign brain tumor. LKW and onset of symptoms @ 2000 this evening.        EXTERNAL RECORDS REVIEWED  Inpatient Notes      HPI/ROS  LIMITATION TO HISTORY     OUTSIDE HISTORIAN(S):  Family      Heide Rangel is a 53 y.o. female who presents with change in speech and right arm numbness.  Patient says around 8 PM she noticed a change in her speech and tingling/numb feeling in her right arm and leg.  Patient says she smoked marijuana recently and used methamphetamine 2 days ago.  Patient denies other recent illness or trauma.  Patient says she has missed a few doses of her blood pressure medications.    PAST MEDICAL HISTORY   has a past medical history of COPD (chronic obstructive pulmonary disease) (HCC), Hypertension, Kidney disease, and Substance abuse (HCC).    SURGICAL HISTORY  patient denies any surgical history    FAMILY HISTORY  History reviewed. No pertinent family history.    SOCIAL HISTORY  Social History     Tobacco Use    Smoking status: Every Day     Packs/day: .5     Types: Cigarettes    Smokeless tobacco: Never   Vaping Use    Vaping Use: Every day    Substances: Nicotine, THC   Substance and Sexual Activity    Alcohol use: Yes     Alcohol/week: 0.6 oz     Types: 1 Glasses of wine per week     Comment: once    Drug use: Yes     Types: Marijuana, Inhaled    Sexual activity: Not on file       CURRENT MEDICATIONS  Home Medications       Reviewed by Gaurav Oden (Pharmacy Tech) on 08/17/23 at 2255  Med List Status: Complete     Medication Last Dose Status   amLODIPine (NORVASC) 10 MG Tab 8/16/2023 Active   asa/apap/caffeine (EXCEDRIN) 250-250-65 MG Tab 8/17/2023 Active   hydrALAZINE (APRESOLINE) 25 MG Tab 8/17/2023 Active   labetalol (NORMODYNE) 200 MG Tab 8/17/2023 Active    losartan-hydrochlorothiazide (HYZAAR) 100-25 MG per tablet 2023 Active   simvastatin (ZOCOR) 40 MG Tab 2023 Active                    ALLERGIES  No Known Allergies    PHYSICAL EXAM  VITAL SIGNS: BP (!) 174/87   Pulse 65   Temp 36.2 °C (97.1 °F) (Temporal)   Resp 20   SpO2 92%    Dysarthria, no facial asymmetry, visual fields intact, no aphasia, mild sensory loss over right upper and lower extremity, normal finger-nose and heel-to-shin, gross motor function intact, clear bilateral breath sounds, normal heart sounds abdomen soft nontender    DIAGNOSTIC STUDIES / PROCEDURES  EKG  I have independently interpreted this EKG  Results for orders placed or performed during the hospital encounter of 23   EKG (NOW)   Result Value Ref Range    Report       [facility]    Test Date:  2023  Pt Name:    LINDA VAZQUEZ                Department: ER  MRN:        [id number]                      Room:       [facility]  Gender:     Female                       Technician: [id number]  :        1970                   Requested By:MYAH GAMEZ  Order #:    [id number]                    Reading MD:    Measurements  Intervals                                Axis  Rate:       78                           P:          62  MS:         178                          QRS:        -5  QRSD:       106                          T:          69  QT:         425  QTc:        485    Interpretive Statements  Sinus rhythm  Left atrial enlargement  Compared to ECG 2021 15:59:48  Atrial abnormality now present           LABS  Labs Reviewed   COMP METABOLIC PANEL - Abnormal; Notable for the following components:       Result Value    Glucose 120 (*)     All other components within normal limits    Narrative:     Indicate which anticoagulants the patient is on:->UNKNOWN  Biotin intake of greater than 5 mg per day may interfere with  troponin levels, causing false low values.   ESTIMATED GFR -  Abnormal; Notable for the following components:    GFR (CKD-EPI) 49 (*)     All other components within normal limits    Narrative:     Indicate which anticoagulants the patient is on:->UNKNOWN  Biotin intake of greater than 5 mg per day may interfere with  troponin levels, causing false low values.   CBC WITH DIFFERENTIAL    Narrative:     Indicate which anticoagulants the patient is on:->UNKNOWN  Biotin intake of greater than 5 mg per day may interfere with  troponin levels, causing false low values.   PROTHROMBIN TIME    Narrative:     Indicate which anticoagulants the patient is on:->UNKNOWN  Biotin intake of greater than 5 mg per day may interfere with  troponin levels, causing false low values.   APTT    Narrative:     Indicate which anticoagulants the patient is on:->UNKNOWN  Biotin intake of greater than 5 mg per day may interfere with  troponin levels, causing false low values.   COD (ADULT)   TROPONIN    Narrative:     Indicate which anticoagulants the patient is on:->UNKNOWN  Biotin intake of greater than 5 mg per day may interfere with  troponin levels, causing false low values.   ABO RH CONFIRM   SED RATE   HEMOGLOBIN A1C   URINE DRUG SCREEN         RADIOLOGY  I have independently interpreted the diagnostic imaging associated with this visit and am waiting the final reading from the radiologist.   My preliminary interpretation is as follows: No intracranial hemorrhage  Radiologist interpretation:   DX-CHEST-PORTABLE (1 VIEW)   Final Result         1.  No acute cardiopulmonary disease.   2.  Cardiomegaly      CT-CTA NECK WITH & W/O-POST PROCESSING   Final Result         1.  CT angiogram of the neck within normal limits.         CT-CTA HEAD WITH & W/O-POST PROCESS   Final Result         1.  No large vessel occlusion or aneurysm identified.      CT-CEREBRAL PERFUSION ANALYSIS   Final Result         1.  Cerebral blood flow less than 30% likely representing completed infarct = 0 mL.      2.  T Max more than  6 seconds likely representing combination of completed infarct and ischemia = 0 mL.      3.  Mismatched volume likely representing ischemic brain/penumbra = None      4.  Please note that the cerebral perfusion was performed on the limited brain tissue around the basal ganglia region. Infarct/ischemia outside the CT perfusion sections can be missed in this study.      CT-HEAD W/O   Final Result         1.  No acute intracranial abnormality.         MR-BRAIN-W/O    (Results Pending)         COURSE & MEDICAL DECISION MAKING    ED Observation Status?     INITIAL ASSESSMENT, COURSE AND PLAN  Care Narrative: Differential includes intracranial hemorrhage, CVA, TIA, hypertensive emergency, toxidrome.  Given onset within 4 to 5 hours of dysarthria and numbness patient was made stroke alert at triage.  Brought for brain imaging emergently which was unremarkable.  On reassessment patient's dysarthria has resolved but still having numbness in right upper and lower extremity.  Patient is notably hypertensive but is IV labetalol, aspirin plus migraine cocktail.  Patient with right temporal tenderness will obtain ESR to assess for possible transfer arteritis.  Spoke with stroke neurologist who overall has lower suspicion for CVA/TIA.  Concern for hypertensive emergency and patient's previous internal parenchymal hemorrhage spoke with hospitalist regarding mission for ongoing blood pressure management and MRI brain.    CRITICAL CARE  The very real possibilty of a deterioration of this patient's condition required the highest level of my preparedness for sudden, emergent intervention.  I provided critical care services, which included medication orders, frequent reevaluations of the patient's condition and response to treatment, ordering and reviewing test results, and discussing the case with various consultants.  The critical care time associated with the care of the patient was 60 minutes. Review chart for interventions. This  time is exclusive of any other billable procedures.           ADDITIONAL PROBLEM LIST    DISPOSITION AND DISCUSSIONS  I have discussed management of the patient with the following physicians and JEAN-PIERRE's: Hospitalist, stroke neurologist    Discussion of management with other [facility] or appropriate source(s):      Escalation of care considered, and ultimately not performed:    Barriers to care at this time, including but not limited to: .     Decision tools and prescription drugs considered including, but not limited to: .    FINAL DIAGNOSIS  1. Paresthesias    Hypertensive emergency       Electronically signed by: Rodolfo Babcock D.O., 8/17/2023 11:30 PM

## 2023-08-18 NOTE — ED TRIAGE NOTES
Chief Complaint   Patient presents with    Possible Stroke    Numbness    Slurred Speech     Pt to triage for slurred speech and N/T of right side. Per pt she has PMH of stroke and benign brain tumor. LKW and onset of symptoms @ 2000 this evening.      .BP (!) 218/122 Comment: missed evening dose, sent by md for htn  Pulse 83   Temp 36.2 °C (97.1 °F) (Temporal)   Resp 18   SpO2 95%

## 2023-08-18 NOTE — CONSULTS
"Neurology STROKE CODE H&P  Neurohospitalist Service, [facility]    Referring Physician: CAROLYNE Camacho*    STROKE CODE: No chief complaint on file.      To obtain the most accurate data regarding the time called, and time patient seen, refer to the stroke run-sheet and chart.  For time of CT, refer to the radiology report. See A&P below for TPA Decision and door to needle time if and when applicable.    HPI: 53-year-old female has a past medical history of a left parietal hemorrhagic infarct in 2021 secondary to hypertension from methamphetamine abuse.  She came today due to nonspecific complaints.  Feeling \"off\".  Complaining of blurry vision.  Also complaining of numbness in the right face possibly a mild facial droop.  The onset is unclear.  She has not felt well all day today.  Her daughter at the bedside says she has been complaining for the past day or 2.  Denies any focal weakness or numbness in her extremities.    Stroke alert was called for slurred speech.    Review of systems: In addition to what is detailed in the HPI above, (and scanned into the chart if and when applicable), all other systems reviewed and are negative.    Past Medical History:    has a past medical history of COPD (chronic obstructive pulmonary disease) (HCC), Hypertension, Kidney disease, and Substance abuse (HCC).    FHx:  No early infarct  SHx:   reports that she has been smoking cigarettes. She has been smoking an average of .5 packs per day. She has never used smokeless tobacco. She reports current alcohol use of about 0.6 oz of alcohol per week. She reports current drug use. Drugs: Marijuana and Inhaled.    Allergies:  No Known Allergies    Medications:  No current facility-administered medications for this encounter.    Current Outpatient Medications:     amLODIPine (NORVASC) 10 MG Tab, Take 1 Tablet by mouth every day., Disp: 100 Tablet, Rfl: 3    hydrALAZINE (APRESOLINE) 25 MG Tab, Take 1 " Tablet by mouth 2 times a day., Disp: 200 Tablet, Rfl: 3    labetalol (NORMODYNE) 200 MG Tab, Take 1 Tablet by mouth 2 times a day., Disp: 200 Tablet, Rfl: 3    losartan-hydrochlorothiazide (HYZAAR) 100-25 MG per tablet, Take 1 Tablet by mouth every day., Disp: 100 Tablet, Rfl: 3    simvastatin (ZOCOR) 40 MG Tab, Take 1 Tablet by mouth every evening., Disp: 100 Tablet, Rfl: 3    Physical Examination:    Vitals:    08/17/23 2111   BP: (!) 218/122   Pulse: 83   Resp: 18   Temp: 36.2 °C (97.1 °F)   TempSrc: Temporal   SpO2: 95%       General: Awake and alert anxious eyes: Unremarkable  CV: RRR    NEUROLOGICAL EXAM:     Mental status: Awake, alert and fully oriented, follows commands, anxious  Speech and language: speech is clear and fluent. The patient is able to name and repeat.  Cranial nerve exam: P pupils equal reactive.  Face appears symmetrical may be a possible small right facial droop on smiling.  Sensation decreased on the right F.  Visual field intact.  No gaze preference.  Hearing intact.  Tongue midline.  Shoulder shrug intact.    Motor exam: Sustained antigravity and in all 4  Sensory exam: No sensory deficits identified   Deep tendon reflexes: toes down-going bilaterally.  Coordination: no ataxia   Gait: deferred due to feeling off and anxious.    NIH Stroke Scale:    1a. Level of Consciousness (Alert, drowsy, etc): 0= Alert    1b. LOC Questions (Month, age): 0= Answers both correctly    1c. LOC Commands (Open/close eyes make fist/let go): 0= Obeys both correctly    2.   Best Gaze (Eyes open - patient follows examiner's finger on face): 0= Normal    3.   Visual Fields (introduce visual stimulus/threat to patient's field quadrants): 0= No visual loss  4.   Facial Paresis (Show teeth, raise eyebrows and squeeze eyes shut): 1= Minor     5a. Motor Arm - Left (Elevate arm to 90 degrees if patient is sitting, 45 degrees if  supine): 0= No drift    5b. Motor Arm - Right (Elevate arm to 90 degrees if patient is  "sitting, 45 degrees if supine): 0= No drift    6a. Motor Leg - Left (Elevate leg 30 degrees with patient supine): 0= No drift    6b. Motor Leg - Right  (Elevate leg 30 degrees with patient supine): 0= No drift    7.   Limb Ataxia (Finger-nose, heel down shin): 0= No ataxia    8.   Sensory (Pin prick to face, arm, trunk and leg - compare side to side): 0= Normal    9.  Best Language (Name item, describe a picture and read sentences): 0= No aphasia    10. Dysarthria (Evaluate speech clarity by patient repeating listed words): 0= Normal articulation    11. Extinction and Inattention (Use information from prior testing to identify neglect or  double simultaneous stimuli testing): 0= No neglect    Total NIH Score: 1    Modified Faith Scale (MRS): 0 = No symptoms      Objective Data:    Labs:  No results found for: \"PROTHROMBTM\", \"INR\"   Lab Results   Component Value Date/Time    WBC 6.90 06/02/2023 07:04 PM    WBC 7.2 09/29/2021 01:08 PM    RBC 4.96 06/02/2023 07:04 PM    RBC 5.27 09/29/2021 01:08 PM    HEMOGLOBIN 14.5 06/02/2023 07:04 PM    HEMOGLOBIN 14.8 09/29/2021 01:08 PM    HEMATOCRIT 42.5 06/02/2023 07:04 PM    HEMATOCRIT 44.1 09/29/2021 01:08 PM    MCV 85.7 06/02/2023 07:04 PM    MCV 83.7 09/29/2021 01:08 PM    MCH 29.2 06/02/2023 07:04 PM    MCH 28.1 09/29/2021 01:08 PM    MCHC 34.1 06/02/2023 07:04 PM    MCHC 33.6 09/29/2021 01:08 PM    MPV 7.5 06/02/2023 07:04 PM    MPV 10.3 09/29/2021 01:08 PM    NEUTSPOLYS 66.0 06/02/2023 07:04 PM    NEUTSPOLYS 60.70 08/29/2021 05:01 AM    LYMPHOCYTES 24.5 06/02/2023 07:04 PM    LYMPHOCYTES 26.30 08/29/2021 05:01 AM    MONOCYTES 4.9 06/02/2023 07:04 PM    MONOCYTES 8.30 08/29/2021 05:01 AM    EOSINOPHILS 3.6 06/02/2023 07:04 PM    EOSINOPHILS 4.10 08/29/2021 05:01 AM    BASOPHILS 1.0 06/02/2023 07:04 PM    BASOPHILS 0.30 08/29/2021 05:01 AM      Lab Results   Component Value Date/Time    SODIUM 140 08/17/2023 06:42 AM    POTASSIUM 3.7 08/17/2023 06:42 AM    CHLORIDE 102 " 08/17/2023 06:42 AM    CO2 28 08/17/2023 06:42 AM    GLUCOSE 93 08/17/2023 06:42 AM    BUN 19 08/17/2023 06:42 AM    CREATININE 1.17 08/17/2023 06:42 AM    BUNCREATRAT 12.9 06/02/2023 07:04 PM      Lab Results   Component Value Date/Time    CHOLSTRLTOT 175 08/17/2023 06:42 AM    LDL 80 08/17/2023 06:42 AM    HDL 85 08/17/2023 06:42 AM    TRIGLYCERIDE 48 08/17/2023 06:42 AM       Lab Results   Component Value Date/Time    ALKPHOSPHAT 63 06/02/2023 07:04 PM    ALKPHOSPHAT 80 09/29/2021 01:08 PM    ASTSGOT 17 06/02/2023 07:04 PM    ASTSGOT 18 09/29/2021 01:08 PM    ALTSGPT 13 06/02/2023 07:04 PM    ALTSGPT 16 09/29/2021 01:08 PM    TBILIRUBIN 0.4 06/02/2023 07:04 PM    TBILIRUBIN 0.5 09/29/2021 01:08 PM        Imaging/Testing:  CT-CEREBRAL PERFUSION ANALYSIS   Final Result         1.  Cerebral blood flow less than 30% likely representing completed infarct = 0 mL.      2.  T Max more than 6 seconds likely representing combination of completed infarct and ischemia = 0 mL.      3.  Mismatched volume likely representing ischemic brain/penumbra = None      4.  Please note that the cerebral perfusion was performed on the limited brain tissue around the basal ganglia region. Infarct/ischemia outside the CT perfusion sections can be missed in this study.      CT-HEAD W/O   Final Result         1.  No acute intracranial abnormality.         CT-CTA HEAD WITH & W/O-POST PROCESS    (Results Pending)   CT-CTA NECK WITH & W/O-POST PROCESSING    (Results Pending)   DX-CHEST-PORTABLE (1 VIEW)    (Results Pending)         Assessment and Plan:    53-year-old female has a medical history of a left parietal hemorrhagic infarct in 2021 secondary to hypertension from methamphetamine abuse.  Presents today with nonspecific complaints.  Says she is having difficulty holding her balance.  Visual changes.  No focal weakness or numbness in extremities.  Patient appears to be very anxious.  The perfusion scan and CTA head and neck upon my review  appears to be unremarkable.  At this time  do not see any intervention indicated thrombolytics or IR.  Onset is unclear.  Also the exams not consistent for an ischemic stroke at this time.  Recommend toxic metabolic work-up including UDS.  If unremarkable primary team can proceed with imaging, press syndrome is also possibility given her high blood pressures currently.  Over 200 systolic.  Please call back to neurology if any needs other assistance.    The evaluation of the patient, and recommended management, was discussed with the resident staff.     This chart was partially generated using voice recognition technology and sound alike word replacement may be present, best efforts were made to make the chart accurate.    Barrington Desir MD  Board Certified Neurology, ABPN  (t) [phone]

## 2023-08-18 NOTE — ED NOTES
Pt to CT with this RN. Pt tolerated imaging well. Bedside report to Gaby KEYS. Pt is A&Ox4, GCS 15, on continuous cardiac, pulse ox and BP monitors. Pt is on RA. Pt is not a fall risk based on assessment.

## 2023-08-18 NOTE — DISCHARGE SUMMARY
Discharge Summary    CHIEF COMPLAINT ON ADMISSION  Chief Complaint   Patient presents with    Possible Stroke    Numbness    Slurred Speech     Pt to triage for slurred speech and N/T of right side. Per pt she has PMH of stroke and benign brain tumor. LKW and onset of symptoms @ 2000 this evening.        Reason for Admission  Blurry vision, Weakness     Admission Date  8/17/2023    CODE STATUS  Full Code    HPI & HOSPITAL COURSE  Heide Rangel is a 53 y.o. female who presented 8/17/2023 with slurred speech, which was resolved by the time of initial evaluation.  PMH of hypertension, CKD 3, hemorrhagic stroke, dyslipidemia, tobacco abuse, migraines, drug use.  Patient comes in complaining of slurred speech that began today, she also has been having numbness on the right side of her body for the past 2-3 days.  She is also complaining of disequilibrium and falling or feeling like she is about to fall for over a week now.  Patient reported missing some doses of her BP meds and her last meth/cocaine use being 1-2 days ago.  Family have blood pressure usually stays in the 160-180 systolic.  Patient's vitals were remarkable for elevated blood pressure of 204/110 which went up to 247/108  Patient's labs are relevant for UDS positive for amphetamines, cannabinoid and cocaine.  CT head, CTA head neck, CT cerebral perfusion and MRI unremarkable apart from chronic changes.    She was treated with IV labetalol and her home BP medications were resumed which improved her BP.  Upon my evaluation today her last blood pressure was 157/83.    Patient was managed with BP control IV labetalol and we resumed her home medications.  Her blood pressure improved.  Advised patient in length about being compliant with her BP meds, keeping a written log and stopping drug use completely.  Went over risks of drug use in detail.  Advised patient to follow-up with PCP with her routine BP log for adjustment of medications if needed.  We are  resuming all her home BP medications, and have increased hydralazine from 25 twice daily to 3 times daily.      Therefore, she is discharged in good and stable condition to home with close outpatient follow-up.    The patient recovered much more quickly than anticipated on admission.    Discharge Date  08/18/2023      FOLLOW UP ITEMS POST DISCHARGE  PCP    DISCHARGE DIAGNOSES  Principal Problem:    Hypertensive emergency (POA: Yes)  Active Problems:    Stage 3a chronic kidney disease (HCC) (POA: Yes)    Methamphetamine abuse (HCC) (POA: Yes)    Tobacco abuse (POA: Yes)    Essential hypertension (POA: Yes)    Other hyperlipidemia (POA: Yes)    History of hemorrhagic stroke (POA: Yes)    Acute right-sided weakness (POA: Yes)  Resolved Problems:    * No resolved hospital problems. *      FOLLOW UP  Future Appointments   Date Time Provider Department Center   9/19/2023  4:15 PM ERNESTO RussRCINDA CARCB None     THOMAS Carey  [address]  [address]  [facility] [address]  [phone]    Follow up in 1 week(s)  Please make a log of your BP at home and take this with you when you follow-up with your PCP for adjustment of your BP meds.,  As indicated.      MEDICATIONS ON DISCHARGE     Medication List        CHANGE how you take these medications        Instructions   hydrALAZINE 25 MG Tabs  What changed: when to take this  Commonly known as: Apresoline   Take 1 Tablet by mouth 3 times a day.  Dose: 25 mg            CONTINUE taking these medications        Instructions   amLODIPine 10 MG Tabs  Commonly known as: Norvasc   Take 1 Tablet by mouth every day.  Dose: 10 mg     asa/apap/caffeine 250-250-65 MG Tabs  Commonly known as: Excedrin   Take 1 Tablet by mouth every 6 hours as needed for Headache.  Dose: 1 Tablet     labetalol 200 MG Tabs  Commonly known as: Normodyne   Take 1 Tablet by mouth 2 times a day.  Dose: 200 mg     losartan-hydrochlorothiazide 100-25 MG per tablet  Commonly known as:  Hyzaar   Take 1 Tablet by mouth every day.  Dose: 1 Tablet     simvastatin 40 MG Tabs  Commonly known as: Zocor   Take 1 Tablet by mouth every evening.  Dose: 40 mg              Allergies  No Known Allergies    DIET  Orders Placed This Encounter   Procedures    Diet Order Diet: Regular     Standing Status:   Standing     Number of Occurrences:   1     Order Specific Question:   Diet:     Answer:   Regular [1]       ACTIVITY  As tolerated.  Weight bearing as tolerated    CONSULTATIONS  NEUROLOGY    PROCEDURES  NA    LABORATORY  Lab Results   Component Value Date    SODIUM 139 08/17/2023    POTASSIUM 3.6 08/17/2023    CHLORIDE 100 08/17/2023    CO2 27 08/17/2023    GLUCOSE 120 (H) 08/17/2023    BUN 21 08/17/2023    CREATININE 1.30 08/17/2023        Lab Results   Component Value Date    WBC 7.9 08/17/2023    HEMOGLOBIN 15.6 08/17/2023    HEMATOCRIT 45.8 08/17/2023    PLATELETCT 249 08/17/2023        Total time of the discharge process exceeds 45 minutes.

## 2023-08-18 NOTE — ED NOTES
Patient remains comfortable on gurney, no identifiable needs at this time. Equal chest rise and fall bilaterally, pt connected to cardiac monitor. Pending bed assignment. Safety measures in place, call light within reach.

## 2023-08-18 NOTE — ED NOTES
Pt given discharge instructions/home care instructions explained, pt verbalized understanding of instructions given/pt understands the importance of follow up, pt ambulatory to ER lob, to be driven home by S/O.

## 2023-08-18 NOTE — ED NOTES
Pt /106. Pt mediated per MAR accordingly. Pt verbalized understanding. All questions answered. Patient remains comfortable on gurney. Equal chest rise and fall bilaterally, pt connected to cardiac monitor. Safety measures in place, call light within reach.

## 2023-08-18 NOTE — ED NOTES
Bedside report received from Gaby KEYS, pt currently resting in bed, easy, equal respirations. S/O at bedside, updated on POC.

## 2023-08-18 NOTE — ASSESSMENT & PLAN NOTE
Slurred speech, right-sided weakness and sensory change.  Symptoms resolved on my evaluation  Neurology evaluated in the ED.  Likely due to hypertensive emergency  CT head, CTA head and neck, CT perfusion personally reviewed and unremarkable  Stroke or cellulitis, MRI ordered

## 2023-08-18 NOTE — ASSESSMENT & PLAN NOTE
Patient smokes 0.5 PPD.  Nicotine patch and/or gum ordered.   I discussed cessation with patient including starting on nicotine patch and/or gum on discharge.  I also discussed medications to help with cessation with patient including Wellbutrin and Chantix, declined.  Smoking cessation discussed with patient for 4 minutes.

## 2023-08-18 NOTE — H&P
Hospital Medicine History & Physical Note    Date of Service  8/17/2023    Primary Care Physician  JAMES Carey.    Consultants  neurology  Specialist Names: Dr Deal     Code Status  Full Code    Chief Complaint  Chief Complaint   Patient presents with    Possible Stroke    Numbness    Slurred Speech     Pt to triage for slurred speech and N/T of right side. Per pt she has PMH of stroke and benign brain tumor. LKW and onset of symptoms @ 2000 this evening.        History of Presenting Illness  Heide Rangel is a 53 y.o. female who presented 8/17/2023 with slurred speech.  PMH of hypertension, CKD 3, hemorrhagic stroke, dyslipidemia, tobacco abuse, migraines.  Patient comes in complaining of slurred speech that began today, she also has been having numbness on the right side of her body for the past 2-3 days.  Symptoms resolved on my evaluation.  She is also complaining of disequilibrium and falling or feeling like she is about to fall for over a week now.    Patient saw cardiology today further work-up is being done for her uncontrolled hypertension.  Family say she is usually in the 160-180 systolic.  Patient does have compliance issues but that has improved lately.  History of methamphetamine abuse as well.    I discussed the plan of care with patient, family, bedside RN, and EDP .    Review of Systems  Review of Systems   Constitutional:  Negative for chills and fever.   Respiratory:  Negative for cough and shortness of breath.    Cardiovascular:  Negative for chest pain.   Gastrointestinal:  Negative for abdominal pain, diarrhea, nausea and vomiting.   Genitourinary:  Negative for dysuria and urgency.   Neurological:  Positive for sensory change, weakness and headaches.       Past Medical History   has a past medical history of COPD (chronic obstructive pulmonary disease) ([facility]), Hypertension, Kidney disease, and Substance abuse ([facility]).    Surgical History   has no past surgical history on  file.     Family History  Family history reviewed with patient. There is no family history that is pertinent to the chief complaint.     Social History   reports that she has been smoking cigarettes. She has been smoking an average of .5 packs per day. She has never used smokeless tobacco. She reports current alcohol use of about 0.6 oz of alcohol per week. She reports current drug use. Drugs: Marijuana and Inhaled.    Allergies  No Known Allergies    Medications  Prior to Admission Medications   Prescriptions Last Dose Informant Patient Reported? Taking?   amLODIPine (NORVASC) 10 MG Tab 8/16/2023 at PM Patient, Family Member No No   Sig: Take 1 Tablet by mouth every day.   asa/apap/caffeine (EXCEDRIN) 250-250-65 MG Tab 8/17/2023 at AM Patient, Family Member Yes Yes   Sig: Take 1 Tablet by mouth every 6 hours as needed for Headache.   hydrALAZINE (APRESOLINE) 25 MG Tab 8/17/2023 at 1200 Patient, Family Member No No   Sig: Take 1 Tablet by mouth 2 times a day.   labetalol (NORMODYNE) 200 MG Tab 8/17/2023 at 1200 Patient, Family Member No No   Sig: Take 1 Tablet by mouth 2 times a day.   losartan-hydrochlorothiazide (HYZAAR) 100-25 MG per tablet 8/17/2023 at 1400 Patient, Family Member No No   Sig: Take 1 Tablet by mouth every day.   simvastatin (ZOCOR) 40 MG Tab 8/17/2023 at 1200 Patient, Family Member No No   Sig: Take 1 Tablet by mouth every evening.      Facility-Administered Medications: None       Physical Exam  Temp:  [36.2 °C (97.1 °F)] 36.2 °C (97.1 °F)  Pulse:  [65-83] 65  Resp:  [18-20] 20  BP: (174-218)/() 174/87  SpO2:  [91 %-98 %] 92 %  Blood Pressure: (!) 214/118   Temperature: 36.2 °C (97.1 °F)   Pulse: 78   Respiration: 20   Pulse Oximetry: 91 %       Physical Exam  Constitutional:       General: She is in acute distress.   HENT:      Head: Normocephalic and atraumatic.      Mouth/Throat:      Mouth: Mucous membranes are moist.      Pharynx: No oropharyngeal exudate or posterior oropharyngeal  "erythema.   Cardiovascular:      Rate and Rhythm: Normal rate and regular rhythm.      Pulses: Normal pulses.      Heart sounds: Normal heart sounds. No murmur heard.  Pulmonary:      Effort: Pulmonary effort is normal. No respiratory distress.      Breath sounds: Normal breath sounds.   Musculoskeletal:         General: No swelling or tenderness. Normal range of motion.   Skin:     General: Skin is warm and dry.   Neurological:      General: No focal deficit present.      Mental Status: She is alert and oriented to person, place, and time.         Laboratory:  Recent Labs     08/17/23 2121   WBC 7.9   RBC 5.39   HEMOGLOBIN 15.6   HEMATOCRIT 45.8   MCV 85.0   MCH 28.9   MCHC 34.1   RDW 41.5   PLATELETCT 249   MPV 9.5     Recent Labs     08/17/23 0642 08/17/23 2121   SODIUM 140 139   POTASSIUM 3.7 3.6   CHLORIDE 102 100   CO2 28 27   GLUCOSE 93 120*   BUN 19 21   CREATININE 1.17 1.30   CALCIUM 9.0 9.7     Recent Labs     08/17/23 0642 08/17/23 2121   ALTSGPT  --  22   ASTSGOT  --  21   ALKPHOSPHAT  --  77   TBILIRUBIN  --  0.4   GLUCOSE 93 120*     Recent Labs     08/17/23 2121   APTT 28.5   INR 0.97     No results for input(s): \"NTPROBNP\" in the last 72 hours.  Recent Labs     08/17/23 0642   TRIGLYCERIDE 48   HDL 85   LDL 80     Recent Labs     08/17/23 2121   TROPONINT 9       Imaging:  DX-CHEST-PORTABLE (1 VIEW)   Final Result         1.  No acute cardiopulmonary disease.   2.  Cardiomegaly      CT-CTA NECK WITH & W/O-POST PROCESSING   Final Result         1.  CT angiogram of the neck within normal limits.         CT-CTA HEAD WITH & W/O-POST PROCESS   Final Result         1.  No large vessel occlusion or aneurysm identified.      CT-CEREBRAL PERFUSION ANALYSIS   Final Result         1.  Cerebral blood flow less than 30% likely representing completed infarct = 0 mL.      2.  T Max more than 6 seconds likely representing combination of completed infarct and ischemia = 0 mL.      3.  Mismatched volume likely " representing ischemic brain/penumbra = None      4.  Please note that the cerebral perfusion was performed on the limited brain tissue around the basal ganglia region. Infarct/ischemia outside the CT perfusion sections can be missed in this study.      CT-HEAD W/O   Final Result         1.  No acute intracranial abnormality.         MR-BRAIN-W/O    (Results Pending)       X-Ray:  I have personally reviewed the images and compared with prior images. and My impression is: No acute process  EKG:  I have personally reviewed the images and compared with prior images. and My impression is: NSR, slight ST depression in the lateral leads    Assessment/Plan:  Justification for Admission Status  I anticipate this patient will require at least two midnights for appropriate medical management, necessitating inpatient admission because hypertensive emergency    * Hypertensive emergency- (present on admission)  Assessment & Plan  Systolic pressure in the 200s, history of poorly controlled hypertension  Causing neurologic symptoms.  Neurology evaluated believes related to blood pressure  Component of noncompliance and methamphetamine abuse.  There is also family history of pheochromocytoma, being worked up as outpatient  Continue home meds.  IV as needed antihypertensives ordered      Acute right-sided weakness- (present on admission)  Assessment & Plan  Slurred speech, right-sided weakness and sensory change.  Symptoms resolved on my evaluation  Neurology evaluated in the ED.  Likely due to hypertensive emergency  CT head, CTA head and neck, CT perfusion personally reviewed and unremarkable  Stroke or cellulitis, MRI ordered      History of hemorrhagic stroke- (present on admission)  Assessment & Plan  Per chart review history of cortical hemorrhage of left cerebral hemisphere attributed to hypertensive emergency    Other hyperlipidemia- (present on admission)  Assessment & Plan  Change simvastatin to atorvastatin    Essential  hypertension- (present on admission)  Assessment & Plan  Continue amlodipine, hydralazine, hydrochlorothiazide, labetalol, losartan    Tobacco abuse- (present on admission)  Assessment & Plan  Patient smokes 0.5 PPD.  Nicotine patch and/or gum ordered.   I discussed cessation with patient including starting on nicotine patch and/or gum on discharge.  I also discussed medications to help with cessation with patient including Wellbutrin and Chantix, declined.  Smoking cessation discussed with patient for 4 minutes.      Methamphetamine abuse (HCC)- (present on admission)  Assessment & Plan  Urine drug screen ordered.  Contributing to her hypertension    Stage 3a chronic kidney disease (HCC)- (present on admission)  Assessment & Plan  At baseline, avoid nephrotoxic agents      Patient is critically ill.   The patient continues to have: Hypertensive emergency causing neurologic deficits  The vital organ system that is affected is the: Neuro  If untreated there is a high chance of deterioration And eventually death.   The critical care that I am providing today is: Status of chart including notes, labs, imaging interpretation.  Multiple bedside assessments to evaluate response to treatment to monitor for any side effects.  Hypertensive emergency requiring multiple IV antihypertensives to control, needs close neurological monitoring.  The critical that has been undertaken is medically complex.   There has been no overlap in critical care time.   Critical Care Time not including procedures: 42    VTE prophylaxis: SCDs/TEDs

## 2023-08-18 NOTE — ASSESSMENT & PLAN NOTE
Per chart review history of cortical hemorrhage of left cerebral hemisphere attributed to hypertensive emergency

## 2023-08-18 NOTE — ASSESSMENT & PLAN NOTE
Systolic pressure in the 200s, history of poorly controlled hypertension  Causing neurologic symptoms.  Neurology evaluated believes related to blood pressure  Component of noncompliance and methamphetamine abuse.  There is also family history of pheochromocytoma, being worked up as outpatient  Continue home meds.  IV as needed antihypertensives ordered

## 2023-08-18 NOTE — ED NOTES
Patient remains comfortable on gurney, no identifiable needs at this time. Equal chest rise and fall bilaterally, pt connected to cardiac monitor. Safety measures in place, call light within reach. Neuro exam preformed.

## 2023-08-18 NOTE — ED NOTES
Pt to green 25 from CT. Pt Aox4. Pt appears to be anxious. Pt in gown, on gunrye and on monitor. Neuro at bedside. Pt states to have numbness & tingling in right finger tips and toes x 1 weeks, per Pt. Balance issues and troubled speaking starting today. Safety measures are in place, call [facility] is in reach   GCS 15

## 2023-08-18 NOTE — DISCHARGE INSTRUCTIONS
Please stop using all sorts of drug intake including methamphetamines, cocaine.   Please take your BP meds as prescribed.  Please log in your BP daily on a notepad and discuss with your PCP.  Please do not miss your medication doses.

## 2023-08-18 NOTE — ED NOTES
POC to d/c pt home, pt received routine morning medication for BP. Pt states she can resume this on her own outside of the hospital.

## 2023-08-20 LAB
METANEPHS SERPL-SCNC: 0.15 NMOL/L (ref 0–0.49)
NORMETANEPHRINE SERPL-SCNC: 0.4 NMOL/L (ref 0–0.89)

## 2023-08-23 LAB
DOPAMINE SERPL-MCNC: 44 PG/ML (ref 0–20)
EPINEPH PLAS-MCNC: 12 PG/ML (ref 10–200)
NOREPINEPH PLAS-MCNC: 280 PG/ML (ref 80–520)

## 2023-08-29 ENCOUNTER — HOSPITAL ENCOUNTER (OUTPATIENT)
Facility: MEDICAL CENTER | Age: 53
End: 2023-08-29
Attending: NURSE PRACTITIONER
Payer: COMMERCIAL

## 2023-08-29 DIAGNOSIS — I10 ESSENTIAL HYPERTENSION: ICD-10-CM

## 2023-08-29 PROCEDURE — 83835 ASSAY OF METANEPHRINES: CPT

## 2023-09-03 LAB
COLLECT DURATION TIME SPEC: 24 HRS
CREAT 24H UR-MCNC: 133 MG/DL
CREAT 24H UR-MRATE: 1463 MG/D (ref 500–1400)
METANEPH 24H UR-MCNC: 211 UG/L
METANEPH 24H UR-MRATE: 232 UG/D (ref 36–229)
METANEPH/CREAT 24H UR: 159 UG/G CRT (ref 0–300)
NORMETANEPHRINE 24H UR-MCNC: 983 UG/L
NORMETANEPHRINE 24H UR-MRATE: 1081 UG/D (ref 95–650)
NORMETANEPHRINE/CREAT 24H UR: 739 UG/G CRT (ref 0–400)
SPECIMEN VOL ?TM UR: 1100 ML

## 2023-09-05 ENCOUNTER — TELEPHONE (OUTPATIENT)
Dept: CARDIOLOGY | Facility: MEDICAL CENTER | Age: 53
End: 2023-09-05
Payer: COMMERCIAL

## 2023-09-05 DIAGNOSIS — F15.10 METHAMPHETAMINE ABUSE (HCC): ICD-10-CM

## 2023-09-05 DIAGNOSIS — I10 ESSENTIAL HYPERTENSION: ICD-10-CM

## 2023-09-05 DIAGNOSIS — N18.9 CHRONIC KIDNEY DISEASE, UNSPECIFIED CKD STAGE: ICD-10-CM

## 2023-09-05 NOTE — TELEPHONE ENCOUNTER
Message  Received: Today  THOMAS Russ R.N.  Labs indicative of possible pheochromocytoma.     Please order CT abdomen and pelvis with and without contrast please and call patient to let her know about these findings. SC    Associated Results    Contains abnormal data METANEPHRINES, URINE RANDOM OR 24 HR  -----------------------------------------------------------------------------------------------      Order placed. Pt notified, provided her w/ imaging dept phone number and transferred her over.

## 2023-09-12 ENCOUNTER — TELEPHONE (OUTPATIENT)
Dept: CARDIOLOGY | Facility: MEDICAL CENTER | Age: 53
End: 2023-09-12
Payer: COMMERCIAL

## 2023-09-12 NOTE — TELEPHONE ENCOUNTER
To SC: Spoke with Mahnaz from radiology. Confirmed that she reached out and that they are still reviewing for authorization but that the labs to indicate pheochromocytoma would help.

## 2023-09-15 ENCOUNTER — APPOINTMENT (OUTPATIENT)
Dept: RADIOLOGY | Facility: MEDICAL CENTER | Age: 53
End: 2023-09-15
Attending: NURSE PRACTITIONER
Payer: COMMERCIAL

## 2023-09-19 ENCOUNTER — TELEPHONE (OUTPATIENT)
Dept: CARDIOLOGY | Facility: MEDICAL CENTER | Age: 53
End: 2023-09-19
Payer: COMMERCIAL

## 2023-09-19 ENCOUNTER — APPOINTMENT (OUTPATIENT)
Dept: CARDIOLOGY | Facility: MEDICAL CENTER | Age: 53
End: 2023-09-19
Attending: NURSE PRACTITIONER
Payer: COMMERCIAL

## 2023-09-19 ENCOUNTER — PATIENT MESSAGE (OUTPATIENT)
Dept: CARDIOLOGY | Facility: MEDICAL CENTER | Age: 53
End: 2023-09-19
Payer: COMMERCIAL

## 2023-09-19 DIAGNOSIS — I10 ESSENTIAL HYPERTENSION: ICD-10-CM

## 2023-09-19 DIAGNOSIS — N18.9 CHRONIC KIDNEY DISEASE, UNSPECIFIED CKD STAGE: ICD-10-CM

## 2023-09-19 RX ORDER — HYDRALAZINE HYDROCHLORIDE 50 MG/1
50 TABLET, FILM COATED ORAL 3 TIMES DAILY
Qty: 270 TABLET | Refills: 3 | Status: SHIPPED | OUTPATIENT
Start: 2023-09-19 | End: 2023-10-17

## 2023-09-19 NOTE — PATIENT COMMUNICATION
Phone Number Called: [phone]    Call outcome: Spoke to patient regarding message below.    Message: Called to inform patient that per SC okay to move appointment at this time. Patient latest blood pressure readings are as follows:     135/115  160/106  177/114  153/108  176/112  171/120  169/134  152/105  165/124    Patient reports that she is taking BP medication as prescribed. Patient reports that she is experiencing muscle cramps and believes it is related to her simvastatin. Clarified with patient that diastolic pressures were not her heart rate. Patient confirmed that it is in fact the bottom number. Spoke with SC in person about updated BP, recommends increasing hydralazine to 50 mg TID and to continue all other meds as well as to follow up with BP BID. CT is pending at this time. Okay to hold simvastatin at this time for muscle cramps for two weeks to see if they are improving. Orders for CPK CMP and magnesium to be completed per SC recommendations.     Order for hydralazine, CPK, CMP, and mag placed at this time. Wishpot message sent to patient with recommendations.

## 2023-09-19 NOTE — TELEPHONE ENCOUNTER
Phone Number Called: [phone]    Call outcome: spoke to Slingjot     Message: Called to speak about authorization for procedure. At this time procedure has been denied. It was denied 9/18 as it needs a note saying why the imaging is necessary for abdomen and pelvis or if CT abdomen alone would be sufficient. We can also do peer to peer. We have 5 days to do so. Call reference # [id number].     Spoke with SC in person. Per SC okay to do CT abdomen.     Phone Number Called: [phone]    Call outcome:  spoke to Slingjot    Message: Called to update that CT-abdomen would be okay. Resubmitted for authorization of CT of abdomen. Tracking # [id number].     Phone Number Called: [phone]    Call outcome:  spoke with Mahnaz from imaging authorization     Message: Updated Mahnaz about authorization being denied and speaking to [employer]ling[employer]. Per Mahnaz will update information in patient chart. Will need a letter from SC to help with case.

## 2023-09-19 NOTE — TELEPHONE ENCOUNTER
----- Message from THOMAS Russ sent at 9/13/2023 10:45 AM PDT -----  Regarding: RE: PEER TO PEER  I won't have time to do it this week. We will have to schedule a time for next week on Monday if able. So we will have to reschedule her testing for now. SC  ----- Message -----  From: Emely Chamberlain R.N.  Sent: 9/13/2023  10:18 AM PDT  To: THOMAS Russ  Subject: RE: PEER TO PEER                                 I spoke with Mahnaz and she sent in the auth today with the labs but she feels that the peer to peer will have a better chance of getting it approved.     Thank you!     Emely  ----- Message -----  From: THOMAS Russ  Sent: 9/13/2023   7:31 AM PDT  To: Emely Chamberlain R.N.  Subject: RE: PEER TO PEER                                 Do they need me to call to do peer to peer still or can they review labs and determine if this is enough data to support doing CT? Can you do nurse to nurse call?    Bhavya :)  ----- Message -----  From: Emely Chamberlain R.N.  Sent: 9/12/2023   5:00 PM PDT  To: THOMAS Russ  Subject: RE: PEER TO PEER                                 I realized in the encounter I put I didn't say that I sent the labs to Mahnaz, I sent the lab work over to her though by the way, thank you!   ----- Message -----  From: THOMAS Russ  Sent: 9/12/2023  11:35 AM PDT  To: Emely Chamberlain R.N.  Subject: FW: PEER TO PEER                                 Can you follow up on this and see if they need the last labs that indicate possible pheochromocytoma? They may just need more information.    Thanks,     Bhavya :)  ----- Message -----  From: Mahnaz Art  Sent: 9/11/2023  10:33 AM PDT  To: Magda Rivera, THOMAS  Subject: PEER TO PEER                                     LUCILA,    I submitted auth for the CT-ABDOMEN-PELVIS WITH & W/O and it has gone to a pending status. Could you please call [employer] and  complete a peer to peer to provide additional information to get the authorization.  For peer to peer: [facility] at [phone] and case # [id number].    If you need to speak with me my ext: [phone]    Thank you,    Mahnaz [facility] [employer]

## 2023-09-22 ENCOUNTER — TELEPHONE (OUTPATIENT)
Dept: CARDIOLOGY | Facility: MEDICAL CENTER | Age: 53
End: 2023-09-22
Payer: COMMERCIAL

## 2023-09-22 ENCOUNTER — APPOINTMENT (OUTPATIENT)
Dept: RADIOLOGY | Facility: MEDICAL CENTER | Age: 53
End: 2023-09-22
Attending: NURSE PRACTITIONER
Payer: COMMERCIAL

## 2023-09-22 NOTE — TELEPHONE ENCOUNTER
Labs uploaded to [facility] site for review. Requests for more clinical information from [facility] uploaded to patient chart at this time.

## 2023-09-28 ENCOUNTER — HOSPITAL ENCOUNTER (OUTPATIENT)
Dept: RADIOLOGY | Facility: MEDICAL CENTER | Age: 53
End: 2023-09-28
Attending: NURSE PRACTITIONER
Payer: COMMERCIAL

## 2023-09-28 ENCOUNTER — TELEPHONE (OUTPATIENT)
Dept: CARDIOLOGY | Facility: MEDICAL CENTER | Age: 53
End: 2023-09-28
Payer: COMMERCIAL

## 2023-09-28 DIAGNOSIS — N18.9 CHRONIC KIDNEY DISEASE, UNSPECIFIED CKD STAGE: ICD-10-CM

## 2023-09-28 PROCEDURE — 700117 HCHG RX CONTRAST REV CODE 255: Mod: UD | Performed by: NURSE PRACTITIONER

## 2023-09-28 PROCEDURE — 74170 CT ABD WO CNTRST FLWD CNTRST: CPT

## 2023-09-28 RX ADMIN — IOHEXOL 100 ML: 350 INJECTION, SOLUTION INTRAVENOUS at 16:30

## 2023-09-28 NOTE — TELEPHONE ENCOUNTER
----- Message from THOMAS Russ sent at 9/28/2023 12:26 PM PDT -----  Regarding: reschedule patient to 2 weeks from now please. SC    To schedulers: Please call and schedule patient 2 weeks from now if possible per SC. Thank you!

## 2023-10-03 NOTE — TELEPHONE ENCOUNTER
Fariba Simons  You 31 minutes ago (2:36 PM)     SC 1st avail was 11/02. Scheduled pt and added to waitlist. Is this okay? Thanks!      To SC: Soonest available with you to reschedule patient was 11/2. Is that okay or does patient need to be seen sooner? Thank you!

## 2023-10-03 NOTE — TELEPHONE ENCOUNTER
JAMES Russ.  You 1 minute ago (3:14 PM)     Needs to have BP check non-provider first in 1 week then OK for follow up in November with me. SC      Phone number called: [phone]    Call outcome: Voicemail reached. Message left with number provided to return call.

## 2023-10-04 NOTE — TELEPHONE ENCOUNTER
Phone number called: [phone]    Call outcome: Spoke with patient and she is now scheduled for BP check on 10/10/23.

## 2023-10-17 ENCOUNTER — PATIENT MESSAGE (OUTPATIENT)
Dept: CARDIOLOGY | Facility: MEDICAL CENTER | Age: 53
End: 2023-10-17
Payer: COMMERCIAL

## 2023-10-17 DIAGNOSIS — I10 ESSENTIAL HYPERTENSION: ICD-10-CM

## 2023-10-17 RX ORDER — HYDRALAZINE HYDROCHLORIDE 100 MG/1
100 TABLET, FILM COATED ORAL 3 TIMES DAILY
Qty: 300 TABLET | Refills: 3 | Status: SHIPPED | OUTPATIENT
Start: 2023-10-17

## 2023-10-17 NOTE — PATIENT COMMUNICATION
To SC: it looks like she also missed her non-provider visit for BP check; she has follow up with you on 11/2 as well; thank you!

## 2023-10-23 ENCOUNTER — TELEPHONE (OUTPATIENT)
Dept: CARDIOLOGY | Facility: MEDICAL CENTER | Age: 53
End: 2023-10-23
Payer: COMMERCIAL

## 2023-10-23 NOTE — TELEPHONE ENCOUNTER
Called pt in regards to lab work that was ordered at previous OV. LVM for call back to see if the labs were completed somewhere outside of [facility]. Pt has follow up appointment scheduled with SC on 11/2.

## 2023-11-02 ENCOUNTER — OFFICE VISIT (OUTPATIENT)
Dept: CARDIOLOGY | Facility: MEDICAL CENTER | Age: 53
End: 2023-11-02
Attending: NURSE PRACTITIONER
Payer: COMMERCIAL

## 2023-11-02 VITALS
WEIGHT: 190 LBS | HEART RATE: 89 BPM | SYSTOLIC BLOOD PRESSURE: 146 MMHG | DIASTOLIC BLOOD PRESSURE: 90 MMHG | RESPIRATION RATE: 16 BRPM | OXYGEN SATURATION: 96 % | HEIGHT: 67 IN | BODY MASS INDEX: 29.82 KG/M2

## 2023-11-02 DIAGNOSIS — I10 ESSENTIAL HYPERTENSION: ICD-10-CM

## 2023-11-02 DIAGNOSIS — I61.1 NONTRAUMATIC CORTICAL HEMORRHAGE OF LEFT CEREBRAL HEMISPHERE (HCC): ICD-10-CM

## 2023-11-02 DIAGNOSIS — N18.31 STAGE 3A CHRONIC KIDNEY DISEASE: ICD-10-CM

## 2023-11-02 DIAGNOSIS — E78.49 OTHER HYPERLIPIDEMIA: ICD-10-CM

## 2023-11-02 PROBLEM — I16.1 HYPERTENSIVE EMERGENCY: Status: RESOLVED | Noted: 2023-08-17 | Resolved: 2023-11-02

## 2023-11-02 PROCEDURE — 3080F DIAST BP >= 90 MM HG: CPT | Performed by: NURSE PRACTITIONER

## 2023-11-02 PROCEDURE — 99214 OFFICE O/P EST MOD 30 MIN: CPT | Performed by: NURSE PRACTITIONER

## 2023-11-02 PROCEDURE — 3077F SYST BP >= 140 MM HG: CPT | Performed by: NURSE PRACTITIONER

## 2023-11-02 PROCEDURE — 99212 OFFICE O/P EST SF 10 MIN: CPT | Performed by: NURSE PRACTITIONER

## 2023-11-02 RX ORDER — CLONIDINE HYDROCHLORIDE 0.1 MG/1
0.1 TABLET ORAL EVERY EVENING
Qty: 30 TABLET | Refills: 11 | Status: SHIPPED | OUTPATIENT
Start: 2023-11-02 | End: 2023-11-08

## 2023-11-02 RX ORDER — CLONIDINE HYDROCHLORIDE 0.1 MG/1
0.1 TABLET ORAL 2 TIMES DAILY
Qty: 60 TABLET | Refills: 11 | Status: CANCELLED | OUTPATIENT
Start: 2023-11-02

## 2023-11-02 RX ORDER — ROSUVASTATIN CALCIUM 5 MG/1
5 TABLET, COATED ORAL EVERY EVENING
Qty: 30 TABLET | Refills: 11 | Status: SHIPPED | OUTPATIENT
Start: 2023-11-02

## 2023-11-02 ASSESSMENT — ENCOUNTER SYMPTOMS
DIZZINESS: 0
ABDOMINAL PAIN: 0
PALPITATIONS: 0
FEVER: 0
MYALGIAS: 1
PND: 0
SHORTNESS OF BREATH: 0
ORTHOPNEA: 0
HEADACHES: 1
CLAUDICATION: 0
COUGH: 0

## 2023-11-02 ASSESSMENT — FIBROSIS 4 INDEX: FIB4 SCORE: 0.94

## 2023-11-02 NOTE — PATIENT INSTRUCTIONS
Take hydralazine three times a day.    Start clonidine 0.1 mg once in the evening.    BP <130/80, I will check back in 2 weeks to make sure you got in with HTN clinic.    STOP simvastatin and START rosuvastatin 5 mg every evening.    Obtain new BP cuff. See HTN specialists next month, referral is in.    Repeat labs in 6 months.

## 2023-11-02 NOTE — PROGRESS NOTES
Chief Complaint   Patient presents with    Hypertension     Essential hypertension     Subjective     Heide Rangel is a 53 y.o. female who presents today for follow up on BP review.    Hx of meth/marijuana abuse, COPD with current cigarette smoking, CKD, HLD, HA, and recent cortical hemorrhage of L cerebral hemisphere due to HTN.    She presents today alone. She has children that are grown up.     She has not been taking her hydralazine three times a day, but will do this now.    She is having trouble sleeping.    She still has high BP readings in the 140's at home and forgot her BP log from home. Her BP wrist cuff doesn't match our manual readings.    Recommend HTN specialist now.    She has muscle aches with simvastatin.    She has no shortness of breath, edema, dizziness/lightheadedness, or palpitations.    Past Medical History:   Diagnosis Date    COPD (chronic obstructive pulmonary disease) (HCC)     Hypertension     Kidney disease     Substance abuse (HCC)      History reviewed. No pertinent surgical history.  History reviewed. No pertinent family history.  Social History     Socioeconomic History    Marital status: Single     Spouse name: Not on file    Number of children: Not on file    Years of education: Not on file    Highest education level: Not on file   Occupational History    Not on file   Tobacco Use    Smoking status: Every Day     Current packs/day: 0.50     Types: Cigarettes    Smokeless tobacco: Never   Vaping Use    Vaping Use: Every day    Substances: Nicotine, THC   Substance and Sexual Activity    Alcohol use: Yes     Alcohol/week: 0.6 oz     Types: 1 Glasses of wine per week     Comment: once    Drug use: Yes     Types: Marijuana, Inhaled    Sexual activity: Not on file   Other Topics Concern    Not on file   Social History Narrative    Not on file     Social Determinants of Health     Financial Resource Strain: Not on file   Food Insecurity: Not on file   Transportation Needs: Not on file  "  Physical Activity: Not on file   Stress: Not on file   Social Connections: Not on file   Intimate Partner Violence: Not on file   Housing Stability: Not on file     No Known Allergies  Outpatient Encounter Medications as of 11/2/2023   Medication Sig Dispense Refill    hydrALAZINE (APRESOLINE) 100 MG tablet Take 1 Tablet by mouth 3 times a day. 300 Tablet 3    amLODIPine (NORVASC) 10 MG Tab Take 1 Tablet by mouth every day. 100 Tablet 3    asa/apap/caffeine (EXCEDRIN) 250-250-65 MG Tab Take 1 Tablet by mouth every 6 hours as needed for Headache.      labetalol (NORMODYNE) 200 MG Tab Take 1 Tablet by mouth 2 times a day. 200 Tablet 3    losartan-hydrochlorothiazide (HYZAAR) 100-25 MG per tablet Take 1 Tablet by mouth every day. 100 Tablet 3     No facility-administered encounter medications on file as of 11/2/2023.     Review of Systems   Constitutional:  Positive for malaise/fatigue. Negative for fever.        Feeling poorly today with high BP   Respiratory:  Negative for cough and shortness of breath.    Cardiovascular:  Negative for chest pain, palpitations, orthopnea, claudication, leg swelling and PND.   Gastrointestinal:  Negative for abdominal pain.   Genitourinary:  Negative for urgency.   Musculoskeletal:  Positive for myalgias.   Neurological:  Positive for headaches. Negative for dizziness.        With high BP only              Objective     BP (!) 146/90 (BP Location: Right arm)   Pulse 89   Resp 16   Ht 1.702 m (5' 7\")   Wt 86.2 kg (190 lb)   SpO2 96%   BMI 29.76 kg/m²     Physical Exam  Vitals and nursing note reviewed.   Constitutional:       Appearance: Normal appearance. She is well-developed and normal weight.   HENT:      Head: Normocephalic and atraumatic.   Neck:      Vascular: No JVD. Carotid bruit:  .  Cardiovascular:      Rate and Rhythm: Normal rate and regular rhythm.      Pulses: Normal pulses.      Heart sounds: Normal heart sounds.   Pulmonary:      Effort: Pulmonary effort is " normal.      Breath sounds: Normal breath sounds.   Musculoskeletal:         General: Normal range of motion.   Skin:     General: Skin is warm and dry.      Capillary Refill: Capillary refill takes less than 2 seconds.   Neurological:      General: No focal deficit present.      Mental Status: She is alert and oriented to person, place, and time. Mental status is at baseline.   Psychiatric:         Mood and Affect: Mood normal.         Behavior: Behavior normal.         Thought Content: Thought content normal.         Judgment: Judgment normal.                Assessment & Plan     1. Other hyperlipidemia        2. Nontraumatic cortical hemorrhage of left cerebral hemisphere (HCC)        3. Essential hypertension  Referral to Vascular Medicine      4. Stage 3a chronic kidney disease (HCC)            Medical Decision Making: Today's Assessment/Status/Plan:      1. HTN  -poor control due to non-compliance at times  -cont labetalol 200 mg BID, losartan-HCTZ 100-25 QAM, amlodipine 10 mg QPM, and hydralazine 100 mg TID-she wasn't doing this, she will start  -add clonidine 0.1 mg QPM, consider up titrate to BID  -keep BP log daily  -if SBP <130/80 consistently  -recommend consider stress imaging once BP controlled?  -sister had pheochromocytoma, adrenal imaging negative  -refer to resistant HTN clinic for management    2. CVA  -follow with neurology and PCP    3. CKD  -repeat labs pending    4. HLD  -statin, change to rosuvastatin 5 mg QPM  -repeat lipid in 6 months  -LDL goal <70 with vascular disease    5. Drug abuse and cigarette smoking  -cessation recommended  -she will work on this, no resources wanted at this time    Patient is to follow up with Magda CHAVARRIA in 3-6 months; HTN clinic soon.

## 2023-11-08 ENCOUNTER — OFFICE VISIT (OUTPATIENT)
Dept: VASCULAR LAB | Facility: MEDICAL CENTER | Age: 53
End: 2023-11-08
Attending: FAMILY MEDICINE
Payer: COMMERCIAL

## 2023-11-08 VITALS
HEIGHT: 67 IN | DIASTOLIC BLOOD PRESSURE: 84 MMHG | BODY MASS INDEX: 29.66 KG/M2 | HEART RATE: 80 BPM | SYSTOLIC BLOOD PRESSURE: 143 MMHG | WEIGHT: 189 LBS

## 2023-11-08 DIAGNOSIS — I67.9 SMALL VESSEL DISEASE, CEREBROVASCULAR: ICD-10-CM

## 2023-11-08 DIAGNOSIS — R93.0 ABNORMAL MRI OF HEAD: ICD-10-CM

## 2023-11-08 DIAGNOSIS — Z86.73 HISTORY OF CVA (CEREBROVASCULAR ACCIDENT) WITHOUT RESIDUAL DEFICITS: Chronic | ICD-10-CM

## 2023-11-08 DIAGNOSIS — I16.0 ASYMPTOMATIC HYPERTENSIVE URGENCY: ICD-10-CM

## 2023-11-08 DIAGNOSIS — Z86.73 HISTORY OF STROKE: ICD-10-CM

## 2023-11-08 DIAGNOSIS — R79.89 ELEVATED PLASMA METANEPHRINES: ICD-10-CM

## 2023-11-08 DIAGNOSIS — I1A.0 RESISTANT HYPERTENSION: Chronic | ICD-10-CM

## 2023-11-08 PROCEDURE — 3079F DIAST BP 80-89 MM HG: CPT | Performed by: FAMILY MEDICINE

## 2023-11-08 PROCEDURE — 99212 OFFICE O/P EST SF 10 MIN: CPT

## 2023-11-08 PROCEDURE — 3077F SYST BP >= 140 MM HG: CPT | Performed by: FAMILY MEDICINE

## 2023-11-08 PROCEDURE — 99205 OFFICE O/P NEW HI 60 MIN: CPT | Performed by: FAMILY MEDICINE

## 2023-11-08 RX ORDER — SPIRONOLACTONE AND HYDROCHLOROTHIAZIDE 25; 25 MG/1; MG/1
1 TABLET ORAL DAILY
Qty: 90 TABLET | Refills: 3 | Status: SHIPPED | OUTPATIENT
Start: 2023-11-08

## 2023-11-08 RX ORDER — DOXAZOSIN MESYLATE 1 MG/1
1 TABLET ORAL
Qty: 90 TABLET | Refills: 3 | Status: SHIPPED | OUTPATIENT
Start: 2023-11-08

## 2023-11-08 RX ORDER — CARVEDILOL 25 MG/1
25 TABLET ORAL 2 TIMES DAILY WITH MEALS
Qty: 180 TABLET | Refills: 3 | Status: SHIPPED | OUTPATIENT
Start: 2023-11-08

## 2023-11-08 RX ORDER — OLMESARTAN MEDOXOMIL 40 MG/1
40 TABLET ORAL DAILY
Qty: 90 TABLET | Refills: 3 | Status: SHIPPED | OUTPATIENT
Start: 2023-11-08

## 2023-11-08 ASSESSMENT — ENCOUNTER SYMPTOMS
PALPITATIONS: 0
CHILLS: 0
SPUTUM PRODUCTION: 0
SHORTNESS OF BREATH: 0
PND: 0
CLAUDICATION: 0
FEVER: 0
COUGH: 0
WHEEZING: 0
ORTHOPNEA: 0
HEMOPTYSIS: 0

## 2023-11-08 ASSESSMENT — FIBROSIS 4 INDEX: FIB4 SCORE: 0.94

## 2023-11-08 NOTE — PROGRESS NOTES
RESISTANT HTN CLINIC - INITIAL EVALUATION   11/08/23      Heide Rangel is a female seen for eval/mgmt of HTN, est 11/2023  Heide Rangel is initially referred by Magda Rivera A*     Subjective      HTN:  Home BP log: avg 190s/100s but noting some improvements over last 2 days   Adherence/tolerance to current HTN meds: >90% adherence, tolerating all meds as Rx'd  Initial visit hx:  seen at cardiology 11/2, noted ongoing uncontrolled BP despite current tx plan, here for eval.  No current sx.  Hx of CVA.  Sister with pheo and pt has elevated 24h urine metaneph but has also tested positive for methamphet and cocaine.    Notable racing heart, intermittent HA, shaking.  No tremor.     Lifestyle factors:  Weight Change: stable   Body mass index is 29.6 kg/m².   Wt Readings from Last 3 Encounters:   11/08/23 85.7 kg (189 lb)   11/02/23 86.2 kg (190 lb)   08/17/23 83.9 kg (185 lb)      Diet pattern: common adult, once daily meal, no fast food, salty snacks  Daily salt intake estimate:  Moderate     EtOH: see SHx for details   Exercise habits: minimal exercise  Perceived barriers to care: none   Pertinent HTN hx (reviewed at initial visit):  Age at HTN dx: 22  (if female, any hx of pregnancy-related HTN): 2nd-4th pregnancy, preeclampsia 2nd-4th  Past HTN medications: as noted   HTN crises:  recurrent CVA  Interfering substances/contributing factors:  Stimulants/sympathomimetics (illicit, OTC decongestants):  Yes, Details: positive for methamphet and cocaine 8/2023     CVA:  Initial hx: while cooking had severe HTN, slurring of speech, R facial droop  Residual R hand tingling,    Hyperlipidemia:  Stable, no current concerns  Current treatment: Moderate intensity rosuva 5mg.     Antiplatelet/anticoagulation: No, no bleeding noted     Type 2 DM: No     CKD: Yes, Details: G3a, stable over last 6mo     Sleeping disorder/ANDREY: No     Hypothyroidism: No     Patient Active Problem List    Diagnosis Date Noted     History of CVA (cerebrovascular accident) without residual deficits 11/08/2023    Abnormal MRI of head 11/08/2023    Small vessel disease, cerebrovascular 11/08/2023    Resistant hypertension 11/08/2023    History of hemorrhagic stroke 08/17/2023    Acute right-sided weakness 08/17/2023    Essential hypertension 09/20/2021    Other hyperlipidemia 09/20/2021    Headache 08/30/2021    Dysphagia 08/28/2021    Methamphetamine abuse (HCC) 08/27/2021    Tobacco abuse 08/27/2021    Nontraumatic cortical hemorrhage of left cerebral hemisphere (HCC) 08/25/2021    Stage 3a chronic kidney disease (HCC) 08/25/2021     History reviewed. No pertinent surgical history.    Current Outpatient Medications:     carvedilol, 25 mg, Oral, BID WITH MEALS    olmesartan, 40 mg, Oral, DAILY    doxazosin, 1 mg, Oral, QHS    spironolactone/hctz, 1 Tablet, Oral, DAILY    rosuvastatin, 5 mg, Oral, Q EVENING, Taking    hydrALAZINE, 100 mg, Oral, TID, Taking    amLODIPine, 10 mg, Oral, QDAY, Taking    asa/apap/caffeine, 1 Tablet, Oral, Q6HRS PRN, Taking   Simvastatin    Family History   Problem Relation Age of Onset    Hypertension Sister     Other Sister 32        pheochromocytoma     Social History     Tobacco Use    Smoking status: Every Day     Current packs/day: 0.50     Types: Cigarettes    Smokeless tobacco: Never   Vaping Use    Vaping Use: Every day    Substances: Nicotine, THC   Substance Use Topics    Alcohol use: Yes     Alcohol/week: 0.6 oz     Types: 1 Glasses of wine per week     Comment: once    Drug use: Yes     Types: Marijuana, Inhaled      Review of Systems   Constitutional:  Negative for chills, fever and malaise/fatigue.   Respiratory:  Negative for cough, hemoptysis, sputum production, shortness of breath and wheezing.    Cardiovascular:  Negative for chest pain, palpitations, orthopnea, claudication, leg swelling and PND.           Objective    Vitals:    11/08/23 1532 11/08/23 1536   BP: (!) 173/91 (!) 143/84   BP  "Location: Left arm Left arm   Patient Position: Sitting Sitting   BP Cuff Size: Adult Adult   Pulse: 82 80   Weight: 85.7 kg (189 lb)    Height: 1.702 m (5' 7\")      Body mass index is 29.6 kg/m².  BP Readings from Last 5 Encounters:   11/08/23 (!) 143/84   11/02/23 (!) 146/90   08/18/23 (!) 179/85   08/17/23 (!) 204/110   07/07/23 (!) 160/90      Physical Exam  Vitals reviewed.   Constitutional:       General: She is not in acute distress.     Appearance: Normal appearance.   HENT:      Head: Normocephalic and atraumatic.   Neck:      Thyroid: No thyroid mass.      Vascular: Normal carotid pulses.      Trachea: Trachea normal.   Cardiovascular:      Rate and Rhythm: Normal rate and regular rhythm.      Chest Wall: PMI is not displaced.      Pulses: Normal pulses.           Carotid pulses are 2+ on the right side and 2+ on the left side.       Radial pulses are 2+ on the right side and 2+ on the left side.        Dorsalis pedis pulses are 2+ on the right side and 2+ on the left side.        Posterior tibial pulses are 2+ on the right side and 2+ on the left side.      Heart sounds: Normal heart sounds.   Pulmonary:      Effort: Pulmonary effort is normal.      Breath sounds: Normal breath sounds.   Musculoskeletal:      Cervical back: Full passive range of motion without pain.      Right lower leg: No edema.      Left lower leg: No edema.   Skin:     General: Skin is warm and dry.      Capillary Refill: Capillary refill takes less than 2 seconds.      Coloration: Skin is not cyanotic.      Nails: There is no clubbing.   Neurological:      General: No focal deficit present.      Mental Status: She is alert and oriented to person, place, and time. Mental status is at baseline.      Cranial Nerves: No cranial nerve deficit.      Coordination: Coordination is intact. Coordination normal.      Gait: Gait is intact. Gait normal.   Psychiatric:         Mood and Affect: Mood normal.         Behavior: Behavior normal.        " "Accessory Clinical Findings:     Lab Results   Component Value Date    CHOLSTRLTOT 175 08/17/2023    LDL 80 08/17/2023    HDL 85 08/17/2023    TRIGLYCERIDE 48 08/17/2023      No results found for: \"LIPOPROTA\"   No results found for: \"APOB\"    Lab Results   Component Value Date    PROTHROMBTM 12.8 08/17/2023    INR 0.97 08/17/2023       Lab Results   Component Value Date    HBA1C 5.8 (H) 08/18/2023      Lab Results   Component Value Date    SODIUM 139 08/17/2023    POTASSIUM 3.6 08/17/2023    CHLORIDE 100 08/17/2023    CO2 27 08/17/2023    GLUCOSE 120 (H) 08/17/2023    BUN 21 08/17/2023    CREATININE 1.30 08/17/2023    BUNCREATRAT 12.9 06/02/2023       Latest Reference Range & Units 08/17/23 06:42   Dopamine 0 - 20 pg/mL 44 (H)   Epinepherine 10 - 200 pg/mL 12   Norepinepherine 80 - 520 pg/mL 280   Metanephrine Plasma 0.00 - 0.49 nmol/L 0.15   Normetanephrine Plasma 0.00 - 0.89 nmol/L 0.40      Latest Reference Range & Units 08/29/23 07:55   Creatinine, Random Urine 500 - 1400 mg/d 1463 (H)   Collection Length Hrs 24   Total Volume mL 1100   Normetaneph, Ur per vol ug/L 983   Normetanephrine -Urine Metanep 0 - 400 ug/g  (H)   Normetanephrine 24 H Urine -Ur 95 - 650 ug/d 1081 (H)   Metanephrine, Ur per vol ug/L 211   Metanephrine Urine -Metaneph F 0 - 300 ug/g    Metanephrine 24 Hr Urine -Urin 36 - 229 ug/d 232 (H)   Creatinine Urine mg/dL 133   (H): Data is abnormally high      Lab Results   Component Value Date    METANEPHPL 0.15 08/17/2023    EFBJAQLA60J 232 (H) 08/29/2023    METANEPHUR 159 08/29/2023    METURVOL 211 08/29/2023     No results found for: \"MICROALBCALC\", \"MALBCRT\", \"MALBEXCR\", \"GHXTCW05\", \"MICROALBUR\", \"MICRALB\", \"UMICROALBUM\", \"MICROALBTIM\"    Latest Reference Range & Units 08/18/23 05:29   Amphetamines Urine Negative  Positive !   Barbiturates Negative  Negative   Benzodiazepines Negative  Negative   Cannabinoid Metab Negative  Positive !   Cocaine Metabolite Negative  Positive ! "   Methadone Negative  Negative   Opiates Negative  Negative   Oxycodone Negative  Negative   Phencyclidine -Pcp Negative  Negative   Propoxyphene Negative  Negative     VASCULAR IMAGING:    MR brain 8/2021  1.  Approximately 40 mm acute parenchymal hemorrhage at the left posterior frontal convexity. No abnormal vascular flow voids or underlying enhancing mass lesion.  2.  Encephalomalacic change in the left frontal deep white matter along the corona radiata and far posterior aspect of the left head of caudate nucleus consistent with old infarction.  3.  Moderate supratentorial white matter disease most consistent with microvascular ischemic change.  4.  Incidental 9 mm meningioma at the floor of the left middle cranial fossa.  5.  Old lacunar size infarct in the left paramedian hans.  6.  Mild-moderate pontine ischemic gliosis.    Echo 2021  No prior study is available for comparison.   Normal left ventricular systolic function.  Left ventricular ejection fraction is visually estimated to be 70%.  No significant valve abnormalities.   No evidence of right to left shunt by agitated saline challenge.  A definite cardiac source for a systemic thromboembolic event is not   identified, failure to do so does not exclude its presence. If   clinically indicated, a transesophageal study would be useful.     CTA head 8/2023   1.  No large vessel occlusion or aneurysm identified.     CTA neck 8/2023   1.  CT angiogram of the neck within normal limits.     MR brain 8/2023   No acute intracranial process.   Encephalomalacia with hemosiderin staining due to remote hemorrhage in the left posterior frontal/anterior parietal region.   Remote left pontine and left corona radiata infarcts.   Nonspecific T2 hyperintensities are noted in the periventricular and deep white matter, most likely related to chronic microvascular ischemia.    CT abd 9/2023   Adrenal glands: Normal. No adrenal mass.  Normal exam           MEDICAL  DECISION-MAKING:  TODAY'S ASSESSMENT / STATUS / PLAN:  1. Asymptomatic hypertensive urgency  US-RENAL ARTERY DUPLEX COMP    ALDOSTERONE    CHROMOGRANIN A    Comp Metabolic Panel    METANEPHRINES PLASMA    MICROALBUMIN CREAT RATIO URINE    RENIN ACTIVITY    carvedilol (COREG) 25 MG Tab    olmesartan (BENICAR) 40 MG Tab    doxazosin (CARDURA) 1 MG Tab    spironolactone/hctz (ALDACTAZIDE) 25-25 MG Tab    CORTISOL      2. Resistant hypertension        3. Small vessel disease, cerebrovascular        4. Abnormal MRI of head        5. History of CVA (cerebrovascular accident) without residual deficits      lacunar, cerebellar per MR      6. History of hemorrhagic stroke        7. Elevated plasma metanephrines  CHROMOGRANIN A    METANEPHRINES PLASMA         Patient Type: Secondary Prevention    1. BLOOD PRESSURE CONTROL   Qualifies as Resistant HTN (RH):  Yes, Uncontrolled RH - BP above goal despite concurrent use of 3 max-tolerated meds from 3 classes   Office BP Goal ACC/AHA (2017) goal <130/80  Home BP at goal:  no  Office BP at goal:  no  24h ABPM:  not ordered to date   RDN candidate? Yes - but currently not available locally   - early onset gest HTN with preeclampsia, now stage 2 with recurrent small vessel CeVD, lacunar infarctions and hemorrhagic CVA hx  - primary goal is stroke reduction   Currently asymptomatic HTN urgency, no s/s of end-organ damage based upon lack of CP, SOB, visual blurring, HA, n/v, gross hematuria.  Pt informed to call 911 or go directly to ED if any of these symptoms occur as this is considered HTN emergency and require prompt attention - pt verbalized understanding of these risks and ED precautions.    Plan:   Monitoring:   - start/continue home BP monitoring, reviewed correct technique:  Yes   - order 24h ABPM: UNDECIDED  - monitor lytes/gfr routinely   - advised that stabilizing BP may require multiple med changes and close monitoring over the next several months, reviewed that initially  there will be additional imaging and labs and the possibility of adverse drug rxn's and variability of his BP and HR until we have things stabilized.  Advised to contact our office as needed for questions or concerns.      2. WORK UP FOR SECONDARY CAUSES OF RH:  Obstructive Sleep Apnea: Not Yet Assessed  Renal parenchymal disease:  G3a baseline   Renovascular HTN: Not Yet Assessed  Primary Aldosteronism: Not Yet Assessed  Thyroid Function: Excluded    Pheochromocytoma: strong fhx (sister who required adrenalectomy)   - has a convincing hx but HTN remains statin and non-paroxysmal   - initial w/u shows normal plasma metaneph with 24h urine normetaneph in convincing high level and slight high metaneph.  Unfortunately this analysis was completed within 1-2 weeks of positive methamphetamine and cocaine testing which could create false positive testing.   - CT adrenal was normal, no nuc med imaging to date   - suggest to stop clonidine and avoid all illicit or OTC stimulants and etoh   - repeat plasma metaneph and we will plan for repeat 24h urine study at next visit   - if strong suspicion and biochemical markers remain high, we will need to purse a whole body nuc med imaging modality for non-adrenal catecholamine-producing tumors   - could consider genetic testing   - will continued mixed a/b blockage with carvedilol and doxazosin - see meds      Cushing's:   Not Yet Assessed   Coarctation of Aorta: excluded  Other: none identified    Recommendations At This Visit: as noted in orders         3. MEDICATION USE / ADHERENCE:   Assessment: Complete  Recommendations: Instructed to Continue Taking All Medications As Prescribed         4. HTN-MEDIATED END-ORGAN DAMAGE:  Left Ventricular Hypertrophy: Absent on  Echocardiogram Date: 2023  Urine Albuminuria: Not Yet Assessed on Date: pending   Renal Function: G3a  - avoid nephrotoxins  - continue HTN control with RAAS blockade   - monitor lytes/gfr routinely  - eval with  nephrology if worsening over time  Ophthalmologic: Absent per patient report and/or eye specialist   Established Cardiovascular Disease:     # hemorrhagic and lacunar CVAs with small vessel CeVD- multiple chronic brainstem, cerebellar   2021 hemorrhagic left front cortex  - has residual symptoms   Plan:  - continue BP mgmt, secondary prevention meds   - monitor for new onset of focal neuro s/s and seek prompt attention     5. LIFESTYLE INTERVENTIONS:    SMOKING: Stages of Change: Contemplative (intention to change in next 6 months )   reports that she has been smoking cigarettes. She has never used smokeless tobacco.   Provided strong recommendation for complete cessation and informed this is the primary contributor to the majority of all ASCVD and cancer-related conditions and can result in significant morbidity and early mortality.   - reviewed resources for cessation including tobacco cessation clinic visit, pharmacotx meds, quit lines  - review at every visit and in more detail at next visit   Provided 1 minutes of face-to-face counseling on above topics     PHYSICAL ACTIVITY: continue healthy activity to improve CV fitness.  In general, targeting >150min/week of moderate-level activity or as much as tolerated in light of functional status and co-morbidities     WEIGHT MANAGEMENT AND NUTRITION: DASH style dietary approach  and Focus on reduced sodium to <2,000mg per day  - strong emphasis on Na reduction today     6. STANDARD HTN PHARMACOTHERAPY:  - stop losartan-hctz  - start olmesartan 40mg daily   - start spironolactone/HCTZ 25/25mg daily   - continue amlodipine 10mg daily     7. ADDITIONAL AGENTS   - stop labetalol, start carvedilol 25mg BID   - start doxazosin 1mg QHS   - continue hydralazine 100mg TID (currently taking BID only) - monitor for drug-induced LE, edema  - stop clonidine to reduce risk for rebound HTN - coonsider TTS patch vs guanfacine instead  - may be candidate for minoxidil     LIPID  MANAGEMENT:   Qualifies for Statin Therapy Based on 2018 ACC/AHA Guidelines: yes, Secondary Prevention - Very high risk ASCVD - 2 major events or 1 event with other high-risk conditions  The ASCVD Risk score (Nathaly DK, et al., 2019) failed to calculate., N/A  Major ASCVD events: History of ischemic CVA     High-risk conditions: Hypertension , CKD (egfr 15-59), and Current smoking   Currently on Statin: Yes, failed simva   Tx goals: LDL-C <55 (if HTG, consider non-HDL-C <85, apoB: not defined)   At goal? no  Plan:   - reinforced ongoing TLC measures as noted   - monitor labs   Meds:   - continue rosuva 5mg daily - will plan increase to 20-40mg  - add zetia as next agent     GLYCEMIA MANAGEMENT:  Normal    ANTIPLATELET/ANTICOAGULANT THERAPY:  will review ASA vs plavix at next visit     OTHER ISSUES:     # substance abuse disorder - prior meth, cocaine positive 8/2023 - current abstinent   - continue ongoing cessation and avoidance of all stimulants   - seek attention with BH counseling and/or consider med mgmt     Studies Ordered At This Visit: KEVAN duplex , may need nuc med imaging   Blood Work to Be Obtained Prior to Next Visit: as noted below   Disposition: RTC in 1 months    Total time: 65min - chart review/prep, review of other providers' records, imaging/lab review, face-to-face time for history/examination, ordering, prescribing,  review of results/meds/ treatment plan with patient/family/caregiver, documentation in EMR, care coordination (as needed)     Jon Guo M.D.  [facility]   [facility]   [phone]

## 2023-12-08 ENCOUNTER — DOCUMENTATION (OUTPATIENT)
Dept: VASCULAR LAB | Facility: MEDICAL CENTER | Age: 53
End: 2023-12-08

## 2023-12-08 NOTE — PROGRESS NOTES
Heide Jenkins showed to follow-up with the vascular medicine clinic.     Scheduling staff will contact to reschedule WITH ANY AVAILABLE PROVIDER and remind patient of the importance of maintaining appointments as scheduled or to contact our office at least 24 hours in advance if they need to reschedule.       Patient should be aware of the potential for worsening conditions without appropriate follow-up and monitoring.      Vascular Medicine Clinic   [facility]   [phone]

## 2024-04-25 ENCOUNTER — DOCUMENTATION (OUTPATIENT)
Dept: VASCULAR LAB | Facility: MEDICAL CENTER | Age: 54
End: 2024-04-25
Payer: COMMERCIAL

## 2024-04-30 PROBLEM — I10 ESSENTIAL HYPERTENSION: Status: RESOLVED | Noted: 2021-09-20 | Resolved: 2024-04-30

## 2024-05-07 ENCOUNTER — TELEPHONE (OUTPATIENT)
Dept: CARDIOLOGY | Facility: MEDICAL CENTER | Age: 54
End: 2024-05-07
Payer: COMMERCIAL

## 2025-01-23 ENCOUNTER — TELEPHONE (OUTPATIENT)
Dept: CARDIOLOGY | Facility: MEDICAL CENTER | Age: 55
End: 2025-01-23
Payer: COMMERCIAL

## 2025-01-23 DIAGNOSIS — E78.49 OTHER HYPERLIPIDEMIA: ICD-10-CM

## 2025-01-23 DIAGNOSIS — I10 ESSENTIAL HYPERTENSION: ICD-10-CM

## 2025-01-23 DIAGNOSIS — I16.0 ASYMPTOMATIC HYPERTENSIVE URGENCY: ICD-10-CM

## 2025-01-23 RX ORDER — ROSUVASTATIN CALCIUM 5 MG/1
5 TABLET, COATED ORAL EVERY EVENING
Qty: 90 TABLET | Refills: 0 | Status: SHIPPED | OUTPATIENT
Start: 2025-01-23

## 2025-01-23 RX ORDER — HYDRALAZINE HYDROCHLORIDE 100 MG/1
100 TABLET, FILM COATED ORAL 3 TIMES DAILY
Qty: 270 TABLET | Refills: 0 | Status: SHIPPED | OUTPATIENT
Start: 2025-01-23 | End: 2025-01-23

## 2025-01-23 RX ORDER — AMLODIPINE BESYLATE 10 MG/1
10 TABLET ORAL
Qty: 90 TABLET | Refills: 0 | Status: SHIPPED | OUTPATIENT
Start: 2025-01-23

## 2025-01-23 RX ORDER — SPIRONOLACTONE AND HYDROCHLOROTHIAZIDE 25; 25 MG/1; MG/1
1 TABLET ORAL DAILY
Qty: 90 TABLET | Refills: 3 | Status: CANCELLED | OUTPATIENT
Start: 2025-01-23

## 2025-01-23 RX ORDER — CARVEDILOL 25 MG/1
25 TABLET ORAL 2 TIMES DAILY WITH MEALS
Qty: 180 TABLET | Refills: 3 | Status: CANCELLED | OUTPATIENT
Start: 2025-01-23

## 2025-01-23 RX ORDER — ROSUVASTATIN CALCIUM 5 MG/1
5 TABLET, COATED ORAL EVERY EVENING
Qty: 90 TABLET | Refills: 0 | Status: SHIPPED | OUTPATIENT
Start: 2025-01-23 | End: 2025-01-23

## 2025-01-23 RX ORDER — HYDRALAZINE HYDROCHLORIDE 100 MG/1
100 TABLET, FILM COATED ORAL 3 TIMES DAILY
Qty: 270 TABLET | Refills: 0 | Status: SHIPPED | OUTPATIENT
Start: 2025-01-23

## 2025-01-23 RX ORDER — AMLODIPINE BESYLATE 10 MG/1
10 TABLET ORAL
Qty: 90 TABLET | Refills: 0 | Status: SHIPPED | OUTPATIENT
Start: 2025-01-23 | End: 2025-01-23

## 2025-01-23 RX ORDER — OLMESARTAN MEDOXOMIL 40 MG/1
40 TABLET ORAL DAILY
Qty: 90 TABLET | Refills: 3 | Status: CANCELLED | OUTPATIENT
Start: 2025-01-23

## 2025-01-23 RX ORDER — DOXAZOSIN 1 MG/1
1 TABLET ORAL
Qty: 90 TABLET | Refills: 3 | Status: CANCELLED | OUTPATIENT
Start: 2025-01-23

## 2025-01-23 NOTE — TELEPHONE ENCOUNTER
Received request via: Patient    Topic/issue: Patient recently suffered a stroke while visiting her daughter in [place]. She hasn't been seen in office since 11/2023 however she ran out of her medication. Isaura is requesting a courtesy refill as patient isn't well enough to travel back to [place] to be seen for a follow up. Please advise.     Was the patient seen in the last year in this department? No    Does the patient have an active prescription (recently filled or refills available) for medication(s) requested? No    Pharmacy Name: [facility] [id number] - formerly [facility] [place] - [address] [[id number]]     Does the patient have [facility] Plus and need 100-day supply? (This applies to ALL medications) Patient does not have SCP    Heydi Fritz, Med Ass't

## 2025-01-23 NOTE — TELEPHONE ENCOUNTER
90 day courtesy refill sent to pharmacy. Garmor message sent to patient, awaiting patient response and will follow up as needed.     To schedulers: please reach out to patient to schedule follow up, thank you!

## 2025-01-23 NOTE — TELEPHONE ENCOUNTER
SC     Received request via: Patient    Was the patient seen in the last year in this department? NO.    Does the patient have an active prescription (recently filled or refills available) for medication(s) requested? No    Pharmacy Name: [facility] [id number] - [facility] [address] [[id number]]     Does the patient have CHCF Plus and need 100-day supply? (This applies to ALL medications) Patient does not have SCP    Thank you,  Anabel HENRIQUEZ

## 2025-02-18 ENCOUNTER — TELEPHONE (OUTPATIENT)
Dept: CARDIOLOGY | Facility: MEDICAL CENTER | Age: 55
End: 2025-02-18
Payer: COMMERCIAL

## 2025-02-19 NOTE — TELEPHONE ENCOUNTER
Called patient to update INS she said she will give us a call back and update her INS when she is home.

## 2025-02-24 PROBLEM — R93.0 ABNORMAL MRI OF HEAD: Status: RESOLVED | Noted: 2023-11-08 | Resolved: 2025-02-24

## 2025-02-28 ENCOUNTER — OFFICE VISIT (OUTPATIENT)
Dept: CARDIOLOGY | Facility: MEDICAL CENTER | Age: 55
End: 2025-02-28
Attending: NURSE PRACTITIONER

## 2025-02-28 ENCOUNTER — TELEPHONE (OUTPATIENT)
Dept: CARDIOLOGY | Facility: MEDICAL CENTER | Age: 55
End: 2025-02-28

## 2025-02-28 VITALS
SYSTOLIC BLOOD PRESSURE: 142 MMHG | DIASTOLIC BLOOD PRESSURE: 90 MMHG | RESPIRATION RATE: 16 BRPM | OXYGEN SATURATION: 96 % | HEART RATE: 87 BPM | WEIGHT: 180 LBS | BODY MASS INDEX: 28.19 KG/M2

## 2025-02-28 DIAGNOSIS — I67.9 SMALL VESSEL DISEASE, CEREBROVASCULAR: ICD-10-CM

## 2025-02-28 DIAGNOSIS — E78.49 OTHER HYPERLIPIDEMIA: ICD-10-CM

## 2025-02-28 DIAGNOSIS — I16.0 ASYMPTOMATIC HYPERTENSIVE URGENCY: ICD-10-CM

## 2025-02-28 DIAGNOSIS — R79.89 ELEVATED PLASMA METANEPHRINES: ICD-10-CM

## 2025-02-28 DIAGNOSIS — N18.31 STAGE 3A CHRONIC KIDNEY DISEASE: ICD-10-CM

## 2025-02-28 DIAGNOSIS — I61.1 NONTRAUMATIC CORTICAL HEMORRHAGE OF LEFT CEREBRAL HEMISPHERE (HCC): ICD-10-CM

## 2025-02-28 DIAGNOSIS — I1A.0 RESISTANT HYPERTENSION: Chronic | ICD-10-CM

## 2025-02-28 DIAGNOSIS — Z86.73 HISTORY OF CVA (CEREBROVASCULAR ACCIDENT) WITHOUT RESIDUAL DEFICITS: Chronic | ICD-10-CM

## 2025-02-28 PROCEDURE — 99211 OFF/OP EST MAY X REQ PHY/QHP: CPT | Performed by: NURSE PRACTITIONER

## 2025-02-28 RX ORDER — SPIRONOLACTONE AND HYDROCHLOROTHIAZIDE 25; 25 MG/1; MG/1
1 TABLET ORAL DAILY
Qty: 90 TABLET | Refills: 3 | Status: SHIPPED | OUTPATIENT
Start: 2025-02-28

## 2025-02-28 ASSESSMENT — ENCOUNTER SYMPTOMS
CLAUDICATION: 0
ABDOMINAL PAIN: 0
DIZZINESS: 0
MYALGIAS: 1
ORTHOPNEA: 0
FEVER: 0
SHORTNESS OF BREATH: 0
PND: 0
PALPITATIONS: 0
HEADACHES: 1
COUGH: 0

## 2025-02-28 ASSESSMENT — FIBROSIS 4 INDEX: FIB4 SCORE: 0.95

## 2025-02-28 NOTE — TELEPHONE ENCOUNTER
Unable to reach patient, left a vm to call us back in regards to a name of doctor she seen at [facility] to obtain medical records.     [facility]  Phone : [phone]

## 2025-02-28 NOTE — PATIENT INSTRUCTIONS
Go to [facility] today to get established with primary care and to obtain medications.    We will obtain records from [facility] in [facility] [place].

## 2025-02-28 NOTE — PROGRESS NOTES
Chief Complaint   Patient presents with    Hyperlipidemia    Hypertension     Subjective     Heide Rangel is a 54 y.o. female who presents today for follow up.    Hx of meth/marijuana abuse, COPD with current cigarette smoking, CKD, HLD, HA, and recent cortical hemorrhage of L cerebral hemisphere due to HTN.    She presents today with her daughter and grand-daughter.     She thinks she is only taking a few of her medications.    She was in the hospital again in [place].for another stroke due to not taking her medications as well as she has been lost to follow up for the last year.    She also lost her insurance and hasn't been taking all her medications. We discussed [facility] clinic for a resource for her.    She has no shortness of breath, edema, dizziness/lightheadedness, or palpitations.    Past Medical History:   Diagnosis Date    COPD (chronic obstructive pulmonary disease) (HCC)     Hypertension     Kidney disease     Substance abuse (HCC)      History reviewed. No pertinent surgical history.  Family History   Problem Relation Age of Onset    Hypertension Sister     Other Sister 32        pheochromocytoma     Social History     Socioeconomic History    Marital status: Single     Spouse name: Not on file    Number of children: Not on file    Years of education: Not on file    Highest education level: Some college, no degree   Occupational History    Not on file   Tobacco Use    Smoking status: Every Day     Current packs/day: 0.50     Types: Cigarettes    Smokeless tobacco: Never   Vaping Use    Vaping status: Every Day    Substances: Nicotine, THC   Substance and Sexual Activity    Alcohol use: Yes     Alcohol/week: 0.6 oz     Types: 1 Glasses of wine per week     Comment: once    Drug use: Yes     Types: Marijuana, Inhaled    Sexual activity: Not on file   Other Topics Concern    Not on file   Social History Narrative    Not on file     Social Drivers of Health     Financial Resource Strain: High Risk  (1/30/2025)    Overall Financial Resource Strain (CARDIA)     Difficulty of Paying Living Expenses: Hard   Food Insecurity: Food Insecurity Present (1/30/2025)    Hunger Vital Sign     Worried About Running Out of Food in the Last Year: Sometimes true     Ran Out of Food in the Last Year: Sometimes true   Transportation Needs: No Transportation Needs (1/30/2025)    PRAPARE - Transportation     Lack of Transportation (Medical): No     Lack of Transportation (Non-Medical): No   Physical Activity: Inactive (1/30/2025)    Exercise Vital Sign     Days of Exercise per Week: 3 days     Minutes of Exercise per Session: 0 min   Stress: Stress Concern Present (1/30/2025)    [employer] [place] [employer] - Occupational Stress Questionnaire     Feeling of Stress : To some extent   Social Connections: Moderately Integrated (1/30/2025)    Social Connection and Isolation Panel [NHANES]     Frequency of Communication with Friends and Family: More than three times a week     Frequency of Social Gatherings with Friends and Family: Once a week     Attends Spiritism Services: 1 to 4 times per year     Active Member of Clubs or Organizations: No     Attends Club or Organization Meetings: Never     Marital Status: Living with partner   Intimate Partner Violence: Not on file   Housing Stability: High Risk (1/30/2025)    Housing Stability Vital Sign     Unable to Pay for Housing in the Last Year: Yes     Number of Times Moved in the Last Year: 0     Homeless in the Last Year: No     Allergies   Allergen Reactions    Morphine Unspecified     Side effect     Outpatient Encounter Medications as of 2/28/2025   Medication Sig Dispense Refill    spironolactone/hctz (ALDACTAZIDE) 25-25 MG Tab Take 1 Tablet by mouth every day. 90 Tablet 3    amLODIPine (NORVASC) 10 MG Tab Take 1 Tablet by mouth every day. 90 Tablet 0    rosuvastatin (CRESTOR) 5 MG Tab Take 1 Tablet by mouth every evening. 90 Tablet 0    asa/apap/caffeine (EXCEDRIN)  250-250-65 MG Tab Take 1 Tablet by mouth every 6 hours as needed for Headache.      [DISCONTINUED] hydrALAZINE (APRESOLINE) 100 MG tablet Take 1 Tablet by mouth 3 times a day. 270 Tablet 0    [DISCONTINUED] carvedilol (COREG) 25 MG Tab Take 1 Tablet by mouth 2 times a day with meals. (Patient not taking: Reported on 2/28/2025) 180 Tablet 3    [DISCONTINUED] olmesartan (BENICAR) 40 MG Tab Take 1 Tablet by mouth every day. (Patient not taking: Reported on 2/28/2025) 90 Tablet 3    [DISCONTINUED] doxazosin (CARDURA) 1 MG Tab Take 1 Tablet by mouth at bedtime. (Patient not taking: Reported on 2/28/2025) 90 Tablet 3    [DISCONTINUED] spironolactone/hctz (ALDACTAZIDE) 25-25 MG Tab Take 1 Tablet by mouth every day. (Patient not taking: Reported on 2/28/2025) 90 Tablet 3     No facility-administered encounter medications on file as of 2/28/2025.     Review of Systems   Constitutional:  Positive for malaise/fatigue. Negative for fever.        Feeling poorly today with high BP   Respiratory:  Negative for cough and shortness of breath.    Cardiovascular:  Negative for chest pain, palpitations, orthopnea, claudication, leg swelling and PND.   Gastrointestinal:  Negative for abdominal pain.   Genitourinary:  Negative for urgency.   Musculoskeletal:  Positive for myalgias.   Neurological:  Positive for headaches. Negative for dizziness.        With high BP only              Objective     BP (!) 142/90 (BP Location: Left arm, Patient Position: Sitting, BP Cuff Size: Adult)   Pulse 87   Resp 16   Wt 81.6 kg (180 lb)   SpO2 96%   BMI 28.19 kg/m²     Physical Exam  Vitals and nursing note reviewed.   Constitutional:       Appearance: Normal appearance. She is well-developed and normal weight.   HENT:      Head: Normocephalic and atraumatic.   Neck:      Vascular: No JVD. Carotid bruit:  .  Cardiovascular:      Rate and Rhythm: Normal rate and regular rhythm.      Pulses: Normal pulses.      Heart sounds: Normal heart sounds.    Pulmonary:      Effort: Pulmonary effort is normal.      Breath sounds: Normal breath sounds.   Musculoskeletal:         General: Normal range of motion.   Skin:     General: Skin is warm and dry.      Capillary Refill: Capillary refill takes less than 2 seconds.   Neurological:      General: No focal deficit present.      Mental Status: She is alert and oriented to person, place, and time. Mental status is at baseline.   Psychiatric:         Mood and Affect: Mood normal.         Behavior: Behavior normal.         Thought Content: Thought content normal.         Judgment: Judgment normal.                Assessment & Plan     1. Nontraumatic cortical hemorrhage of left cerebral hemisphere (HCC)        2. Other hyperlipidemia        3. Stage 3a chronic kidney disease        4. History of CVA (cerebrovascular accident) without residual deficits        5. Elevated plasma metanephrines        6. Resistant hypertension        7. Small vessel disease, cerebrovascular        8. Asymptomatic hypertensive urgency  spironolactone/hctz (ALDACTAZIDE) 25-25 MG Tab          Medical Decision Making: Today's Assessment/Status/Plan:      1. HTN  -poor control due to non-compliance  -cont what she can afford at this time and urgent follow up with [facility] for medication compliance and cost, she will go to them later today   -keep BP log daily  -re-start aldactazide, cont amlodipine for now  -if SBP <130/80 consistently  -sister had pheochromocytoma, adrenal imaging negative  -referred previously to resistant HTN clinic for management, unable to afford multiple visits    2. CVA  -follow with neurology and PCP    3. CKD  -repeat labs done at hospital recently    4. HLD  -cont statin  -repeat lipid at [facility]  -LDL goal <70 with vascular disease    5. Drug abuse and cigarette smoking  -cessation recommended  -she will work on this, no resources wanted at this time    Patient is to follow up with [facility] clinic due to lack of insurance and  needing cheaper medications. Follow up with cardiology when she obtains insurance and/or for urgent cardiac concerns.

## 2025-03-05 ENCOUNTER — APPOINTMENT (OUTPATIENT)
Dept: RADIOLOGY | Facility: MEDICAL CENTER | Age: 55
End: 2025-03-05
Attending: EMERGENCY MEDICINE

## 2025-03-05 ENCOUNTER — HOSPITAL ENCOUNTER (EMERGENCY)
Facility: MEDICAL CENTER | Age: 55
End: 2025-03-05
Attending: EMERGENCY MEDICINE

## 2025-03-05 VITALS
DIASTOLIC BLOOD PRESSURE: 70 MMHG | BODY MASS INDEX: 28.82 KG/M2 | SYSTOLIC BLOOD PRESSURE: 143 MMHG | HEART RATE: 82 BPM | WEIGHT: 183.64 LBS | HEIGHT: 67 IN | OXYGEN SATURATION: 92 % | RESPIRATION RATE: 16 BRPM | TEMPERATURE: 98 F

## 2025-03-05 DIAGNOSIS — R10.9 FLANK PAIN: ICD-10-CM

## 2025-03-05 DIAGNOSIS — N12 PYELONEPHRITIS: ICD-10-CM

## 2025-03-05 DIAGNOSIS — E87.6 HYPOKALEMIA: ICD-10-CM

## 2025-03-05 LAB
ALBUMIN SERPL BCP-MCNC: 4 G/DL (ref 3.2–4.9)
ALBUMIN/GLOB SERPL: 1 G/DL
ALP SERPL-CCNC: 111 U/L (ref 30–99)
ALT SERPL-CCNC: 18 U/L (ref 2–50)
ANION GAP SERPL CALC-SCNC: 14 MMOL/L (ref 7–16)
APPEARANCE UR: ABNORMAL
AST SERPL-CCNC: 26 U/L (ref 12–45)
BACTERIA #/AREA URNS HPF: ABNORMAL /HPF
BASOPHILS # BLD AUTO: 0.5 % (ref 0–1.8)
BASOPHILS # BLD: 0.04 K/UL (ref 0–0.12)
BILIRUB SERPL-MCNC: 0.4 MG/DL (ref 0.1–1.5)
BILIRUB UR QL STRIP.AUTO: NEGATIVE
BUN SERPL-MCNC: 20 MG/DL (ref 8–22)
CALCIUM ALBUM COR SERPL-MCNC: 9.8 MG/DL (ref 8.5–10.5)
CALCIUM SERPL-MCNC: 9.8 MG/DL (ref 8.5–10.5)
CASTS URNS QL MICRO: ABNORMAL /LPF (ref 0–2)
CHLORIDE SERPL-SCNC: 102 MMOL/L (ref 96–112)
CO2 SERPL-SCNC: 26 MMOL/L (ref 20–33)
COLOR UR: YELLOW
CREAT SERPL-MCNC: 1.56 MG/DL (ref 0.5–1.4)
EOSINOPHIL # BLD AUTO: 0.13 K/UL (ref 0–0.51)
EOSINOPHIL NFR BLD: 1.7 % (ref 0–6.9)
EPITHELIAL CELLS 1715: ABNORMAL /HPF (ref 0–5)
ERYTHROCYTE [DISTWIDTH] IN BLOOD BY AUTOMATED COUNT: 38.2 FL (ref 35.9–50)
GFR SERPLBLD CREATININE-BSD FMLA CKD-EPI: 39 ML/MIN/1.73 M 2
GLOBULIN SER CALC-MCNC: 4 G/DL (ref 1.9–3.5)
GLUCOSE SERPL-MCNC: 89 MG/DL (ref 65–99)
GLUCOSE UR STRIP.AUTO-MCNC: NEGATIVE MG/DL
HCG SERPL QL: NEGATIVE
HCG UR QL: NEGATIVE
HCT VFR BLD AUTO: 42.5 % (ref 37–47)
HGB BLD-MCNC: 14.2 G/DL (ref 12–16)
IMM GRANULOCYTES # BLD AUTO: 0.02 K/UL (ref 0–0.11)
IMM GRANULOCYTES NFR BLD AUTO: 0.3 % (ref 0–0.9)
KETONES UR STRIP.AUTO-MCNC: ABNORMAL MG/DL
LEUKOCYTE ESTERASE UR QL STRIP.AUTO: ABNORMAL
LIPASE SERPL-CCNC: 26 U/L (ref 11–82)
LYMPHOCYTES # BLD AUTO: 1.67 K/UL (ref 1–4.8)
LYMPHOCYTES NFR BLD: 21.2 % (ref 22–41)
MCH RBC QN AUTO: 27.6 PG (ref 27–33)
MCHC RBC AUTO-ENTMCNC: 33.4 G/DL (ref 32.2–35.5)
MCV RBC AUTO: 82.7 FL (ref 81.4–97.8)
MICRO URNS: ABNORMAL
MONOCYTES # BLD AUTO: 0.4 K/UL (ref 0–0.85)
MONOCYTES NFR BLD AUTO: 5.1 % (ref 0–13.4)
NEUTROPHILS # BLD AUTO: 5.61 K/UL (ref 1.82–7.42)
NEUTROPHILS NFR BLD: 71.2 % (ref 44–72)
NITRITE UR QL STRIP.AUTO: POSITIVE
NRBC # BLD AUTO: 0 K/UL
NRBC BLD-RTO: 0 /100 WBC (ref 0–0.2)
PH UR STRIP.AUTO: 5.5 [PH] (ref 5–8)
PLATELET # BLD AUTO: 296 K/UL (ref 164–446)
PMV BLD AUTO: 8.8 FL (ref 9–12.9)
POTASSIUM SERPL-SCNC: 3.3 MMOL/L (ref 3.6–5.5)
PROT SERPL-MCNC: 8 G/DL (ref 6–8.2)
PROT UR QL STRIP: 100 MG/DL
RBC # BLD AUTO: 5.14 M/UL (ref 4.2–5.4)
RBC # URNS HPF: ABNORMAL /HPF (ref 0–2)
RBC UR QL AUTO: ABNORMAL
SODIUM SERPL-SCNC: 142 MMOL/L (ref 135–145)
SP GR UR STRIP.AUTO: 1.02
UROBILINOGEN UR STRIP.AUTO-MCNC: 1 EU/DL
WBC # BLD AUTO: 7.9 K/UL (ref 4.8–10.8)
WBC #/AREA URNS HPF: ABNORMAL /HPF

## 2025-03-05 PROCEDURE — 74176 CT ABD & PELVIS W/O CONTRAST: CPT

## 2025-03-05 PROCEDURE — 87086 URINE CULTURE/COLONY COUNT: CPT

## 2025-03-05 PROCEDURE — 81001 URINALYSIS AUTO W/SCOPE: CPT

## 2025-03-05 PROCEDURE — 83690 ASSAY OF LIPASE: CPT

## 2025-03-05 PROCEDURE — 700105 HCHG RX REV CODE 258: Performed by: EMERGENCY MEDICINE

## 2025-03-05 PROCEDURE — 87186 SC STD MICRODIL/AGAR DIL: CPT

## 2025-03-05 PROCEDURE — 96374 THER/PROPH/DIAG INJ IV PUSH: CPT

## 2025-03-05 PROCEDURE — 84703 CHORIONIC GONADOTROPIN ASSAY: CPT

## 2025-03-05 PROCEDURE — 96375 TX/PRO/DX INJ NEW DRUG ADDON: CPT

## 2025-03-05 PROCEDURE — 80053 COMPREHEN METABOLIC PANEL: CPT

## 2025-03-05 PROCEDURE — 700111 HCHG RX REV CODE 636 W/ 250 OVERRIDE (IP): Mod: JZ | Performed by: EMERGENCY MEDICINE

## 2025-03-05 PROCEDURE — 99284 EMERGENCY DEPT VISIT MOD MDM: CPT

## 2025-03-05 PROCEDURE — 36415 COLL VENOUS BLD VENIPUNCTURE: CPT

## 2025-03-05 PROCEDURE — 81025 URINE PREGNANCY TEST: CPT

## 2025-03-05 PROCEDURE — 85025 COMPLETE CBC W/AUTO DIFF WBC: CPT

## 2025-03-05 PROCEDURE — 87077 CULTURE AEROBIC IDENTIFY: CPT

## 2025-03-05 RX ORDER — SODIUM CHLORIDE, SODIUM LACTATE, POTASSIUM CHLORIDE, AND CALCIUM CHLORIDE .6; .31; .03; .02 G/100ML; G/100ML; G/100ML; G/100ML
1000 INJECTION, SOLUTION INTRAVENOUS ONCE
Status: COMPLETED | OUTPATIENT
Start: 2025-03-05 | End: 2025-03-05

## 2025-03-05 RX ORDER — POTASSIUM CHLORIDE 1500 MG/1
20 TABLET, EXTENDED RELEASE ORAL DAILY
Qty: 10 TABLET | Refills: 0 | Status: SHIPPED | OUTPATIENT
Start: 2025-03-05 | End: 2025-03-15

## 2025-03-05 RX ORDER — CEFTRIAXONE 1 G/1
1000 INJECTION, POWDER, FOR SOLUTION INTRAMUSCULAR; INTRAVENOUS ONCE
Status: COMPLETED | OUTPATIENT
Start: 2025-03-05 | End: 2025-03-05

## 2025-03-05 RX ORDER — SULFAMETHOXAZOLE AND TRIMETHOPRIM 800; 160 MG/1; MG/1
1 TABLET ORAL 2 TIMES DAILY
Qty: 14 TABLET | Refills: 0 | Status: ACTIVE | OUTPATIENT
Start: 2025-03-05 | End: 2025-03-12

## 2025-03-05 RX ADMIN — CEFTRIAXONE SODIUM 1000 MG: 1 INJECTION, POWDER, FOR SOLUTION INTRAMUSCULAR; INTRAVENOUS at 22:05

## 2025-03-05 RX ADMIN — SODIUM CHLORIDE, POTASSIUM CHLORIDE, SODIUM LACTATE AND CALCIUM CHLORIDE 1000 ML: 600; 310; 30; 20 INJECTION, SOLUTION INTRAVENOUS at 20:01

## 2025-03-05 RX ADMIN — FENTANYL CITRATE 50 MCG: 50 INJECTION, SOLUTION INTRAMUSCULAR; INTRAVENOUS at 20:02

## 2025-03-05 ASSESSMENT — PAIN DESCRIPTION - PAIN TYPE
TYPE: ACUTE PAIN
TYPE: ACUTE PAIN

## 2025-03-05 ASSESSMENT — FIBROSIS 4 INDEX: FIB4 SCORE: 0.95

## 2025-03-05 NOTE — LETTER
3/8/2025               Heide Tea Claire  [address]  [address]  [place] [place] [facility] [address]        Dear Heide (MR#[id number])    This letter is sent in regards to your recent visit to the [facility] Emergency Department on 3/5/2025. During the visit, tests were performed to assist the physician in your medical diagnosis. A review of your tests requires that we notify you of the following:    Your urine culture was POSITIVE for a bacteria called Escherichia coli. The antibiotic prescribed for you (sulfamethoxazole-trimethoprim) should be active to treat this bacteria. It is important that you continue taking your antibiotic until it is finished.     Please feel free to contact me at the number below if you have any questions or concerns. Thank you for your cooperation in the matter.    Sincerely,  ED Culture Follow-Up Staff  Francisco Valdivia, PharmD    [facility], Emergency Department  [address] [place] [id number] [phone] (ED Culture Line)

## 2025-03-06 NOTE — ED NOTES
IV d/c'd cath intact; no redness, no swelling noted; drsg applied.  D/C home with written and verbal instructions re: Rx, activity, f/u.  Verbalizes understanding.  Ambulated out with family

## 2025-03-06 NOTE — ED TRIAGE NOTES
Chief Complaint   Patient presents with    Flank Pain     Ambulates to triage reports right flank pain x 4 days. Also reports dysuria. Denies blood in urine. Sent here from PCP to get checked out for possible kidney infection vs kidney stones.     Educated on triage process. Instructed to notify staff for any worsening symptoms. Denies any recent travel. Denies exposure to known covid positive patients. Denies any respiratory symptoms.

## 2025-03-06 NOTE — ED NOTES
Assumed care, bedside report received.  Pt resting quietly on gurney, expresses no needs at this time.  Updated on POC  Ambulatory to bathroom with instructions for clean catch urine  Reports continued pain without change

## 2025-03-06 NOTE — ED PROVIDER NOTES
ED Provider Note    CHIEF COMPLAINT  Chief Complaint   Patient presents with    Flank Pain     Ambulates to triage reports right flank pain x 4 days. Also reports dysuria. Denies blood in urine. Sent here from PCP to get checked out for possible kidney infection vs kidney stones.       EXTERNAL RECORDS REVIEWED  Patient's last encounter was in the cardiology clinic February 28 of this year.  She has a known history of a nontraumatic cortical hemorrhage of left hemisphere, hyperlipidemia, stage III chronic kidney disease, resistant hypertension and elevated plasma metanephrines    HPI/ROS  LIMITATION TO HISTORY   Select: : None  OUTSIDE HISTORIAN(S):  None    Heide Tea Rangel is a 54 y.o. female who presents to the emergency department with a chief complaint of flank pain.  Patient states she has had symptoms for 4 days.  Symptoms are on the right.  She does have a history of kidney infections as well as kidney stones.  She complains of urinary frequency but no dysuria.  No obvious josselyn hematuria.  She has not had a fever, nausea or vomiting.  No diarrhea.  She has had all of the symptoms in the past.  Patient also notes she has a history of chronic kidney disease.  She has seen a nephrologist in the past but she states it has been sometime since she followed up.  She has had prior strokes x 2 most recently in January of last year, she also had a stroke in 2021.  She has a history of resistant hypertension.    PAST MEDICAL HISTORY   has a past medical history of COPD (chronic obstructive pulmonary disease) (HCC), Hypertension, Kidney disease, and Substance abuse (HCC).    SURGICAL HISTORY  patient denies any surgical history    FAMILY HISTORY  Family History   Problem Relation Age of Onset    Hypertension Sister     Other Sister 32        pheochromocytoma       SOCIAL HISTORY  Social History     Tobacco Use    Smoking status: Every Day     Current packs/day: 0.50     Types: Cigarettes    Smokeless tobacco: Never  "  Vaping Use    Vaping status: Every Day    Substances: Nicotine, THC   Substance and Sexual Activity    Alcohol use: Yes     Alcohol/week: 0.6 oz     Types: 1 Glasses of wine per week     Comment: once    Drug use: Yes     Types: Marijuana, Inhaled    Sexual activity: Not on file       CURRENT MEDICATIONS  Home Medications       Reviewed by Shalini Burton R.N. (Registered Nurse) on 03/05/25 at 1748  Med List Status: Partial     Medication Last Dose Status   amLODIPine (NORVASC) 10 MG Tab  Active   asa/apap/caffeine (EXCEDRIN) 250-250-65 MG Tab  Active   rosuvastatin (CRESTOR) 5 MG Tab  Active   spironolactone/hctz (ALDACTAZIDE) 25-25 MG Tab  Active                  Audit from Redirected Encounters    **Home medications have not yet been reviewed for this encounter**         ALLERGIES  Allergies   Allergen Reactions    Morphine Unspecified     Side effect       PHYSICAL EXAM  VITAL SIGNS: BP (!) 178/84   Pulse 80   Temp 36.6 °C (97.8 °F) (Temporal)   Resp 20   Ht 1.702 m (5' 7\")   Wt 83.3 kg (183 lb 10.3 oz)   SpO2 90%   BMI 28.76 kg/m²    Vitals reviewed.  Constitutional: Patient is oriented to person, place, and time. Appears well-developed and well-nourished. Mild distress.    Head: Normocephalic and atraumatic.   Mouth/Throat: Oropharynx is clear and moist  Eyes: Conjunctivae are normal.   Neck: Normal range of motion. Neck supple.   Cardiovascular: Normal rate, regular rhythm and normal heart sounds.   Pulmonary/Chest: Effort normal and breath sounds normal. No respiratory distress, no wheezes, rhonchi, or rales.   Abdominal: Soft. Bowel sounds are normal. There is right sided tenderness. No rebound or guarding, or peritoneal signs, no masses. Right CVA tenderness.  Musculoskeletal: No edema and no tenderness.   Neurological: No cranial nerve deficits. Normal gait.  No focal deficits.   Skin: Skin is warm and dry. No erythema. No pallor.   Psychiatric: Patient has a normal mood and affect. "     EKG/LABS  Results for orders placed or performed during the hospital encounter of 03/05/25   CBC WITH DIFFERENTIAL    Collection Time: 03/05/25  5:57 PM   Result Value Ref Range    WBC 7.9 4.8 - 10.8 K/uL    RBC 5.14 4.20 - 5.40 M/uL    Hemoglobin 14.2 12.0 - 16.0 g/dL    Hematocrit 42.5 37.0 - 47.0 %    MCV 82.7 81.4 - 97.8 fL    MCH 27.6 27.0 - 33.0 pg    MCHC 33.4 32.2 - 35.5 g/dL    RDW 38.2 35.9 - 50.0 fL    Platelet Count 296 164 - 446 K/uL    MPV 8.8 (L) 9.0 - 12.9 fL    Neutrophils-Polys 71.20 44.00 - 72.00 %    Lymphocytes 21.20 (L) 22.00 - 41.00 %    Monocytes 5.10 0.00 - 13.40 %    Eosinophils 1.70 0.00 - 6.90 %    Basophils 0.50 0.00 - 1.80 %    Immature Granulocytes 0.30 0.00 - 0.90 %    Nucleated RBC 0.00 0.00 - 0.20 /100 WBC    Neutrophils (Absolute) 5.61 1.82 - 7.42 K/uL    Lymphs (Absolute) 1.67 1.00 - 4.80 K/uL    Monos (Absolute) 0.40 0.00 - 0.85 K/uL    Eos (Absolute) 0.13 0.00 - 0.51 K/uL    Baso (Absolute) 0.04 0.00 - 0.12 K/uL    Immature Granulocytes (abs) 0.02 0.00 - 0.11 K/uL    NRBC (Absolute) 0.00 K/uL   COMP METABOLIC PANEL    Collection Time: 03/05/25  5:57 PM   Result Value Ref Range    Sodium 142 135 - 145 mmol/L    Potassium 3.3 (L) 3.6 - 5.5 mmol/L    Chloride 102 96 - 112 mmol/L    Co2 26 20 - 33 mmol/L    Anion Gap 14.0 7.0 - 16.0    Glucose 89 65 - 99 mg/dL    Bun 20 8 - 22 mg/dL    Creatinine 1.56 (H) 0.50 - 1.40 mg/dL    Calcium 9.8 8.5 - 10.5 mg/dL    Correct Calcium 9.8 8.5 - 10.5 mg/dL    AST(SGOT) 26 12 - 45 U/L    ALT(SGPT) 18 2 - 50 U/L    Alkaline Phosphatase 111 (H) 30 - 99 U/L    Total Bilirubin 0.4 0.1 - 1.5 mg/dL    Albumin 4.0 3.2 - 4.9 g/dL    Total Protein 8.0 6.0 - 8.2 g/dL    Globulin 4.0 (H) 1.9 - 3.5 g/dL    A-G Ratio 1.0 g/dL   LIPASE    Collection Time: 03/05/25  5:57 PM   Result Value Ref Range    Lipase 26 11 - 82 U/L   HCG QUAL SERUM    Collection Time: 03/05/25  5:57 PM   Result Value Ref Range    Beta-Hcg Qualitative Serum Negative Negative    ESTIMATED GFR    Collection Time: 03/05/25  5:57 PM   Result Value Ref Range    GFR (CKD-EPI) 39 (A) >60 mL/min/1.73 m 2   URINALYSIS,CULTURE IF INDICATED    Collection Time: 03/05/25  7:30 PM    Specimen: Urine   Result Value Ref Range    Color Yellow     Character Cloudy (A)     Specific Gravity 1.022 <1.035    Ph 5.5 5.0 - 8.0    Glucose Negative Negative mg/dL    Ketones Trace (A) Negative mg/dL    Protein 100 (A) Negative mg/dL    Bilirubin Negative Negative    Urobilinogen, Urine 1.0 <=1.0 EU/dL    Nitrite Positive (A) Negative    Leukocyte Esterase Moderate (A) Negative    Occult Blood Large (A) Negative    Micro Urine Req Microscopic    BETA-HCG QUALITATIVE URINE    Collection Time: 03/05/25  7:30 PM   Result Value Ref Range    Beta-Hcg Urine Negative Negative   URINE MICROSCOPIC (W/UA)    Collection Time: 03/05/25  7:30 PM   Result Value Ref Range    WBC  (A) /hpf    RBC 21-50 (A) 0 - 2 /hpf    Bacteria Many (A) None /hpf    Epithelial Cells 0-2 0 - 5 /hpf    Urine Casts 3-5 (A) 0 - 2 /lpf     RADIOLOGY/PROCEDURES   I have independently interpreted the diagnostic imaging associated with this visit and am waiting the final reading from the radiologist.   My preliminary interpretation is as follows: No Stones or stranding    Radiologist interpretation:  CT-RENAL COLIC EVALUATION(A/P W/O)   Final Result         1.  Fat-containing bilateral inguinal hernias   2.  Atherosclerosis          COURSE & MEDICAL DECISION MAKING    ASSESSMENT, COURSE AND PLAN  Care Narrative:     This is a pleasant 54-year-old female.  She has multiple medical problems.  She is presenting with urinary symptoms, frequency and right-sided flank pain in the setting of a prior history of kidney stones as well as a prior history of pyelonephritis.  No fever and no nausea or vomiting.  Have ordered labs, IV fluid resuscitation given her dark urine at bedside as well as CT imaging for further evaluation.  Some labs are available upon  my initial evaluation.  She has a low potassium of 3.3.  Her creatinine is elevated at 1.56.  She has a known history of stage III kidney disease.  Previously, the highest creatinine I see is 1.4 from a year ago.  Lipase was normal.  CBC reassuring.  The remainder of her chemistry was reassuring.  She will be treated with oral potassium replacement.    9:48 PM urinalysis shows cloudy urine, trace ketones, large occult blood, elevated protein, positive nitrates leukocyte esterase with increased WBCs 1-100 and many bacteria.  She is treated with Rocephin.  Await imaging.    10:19 PM patient's reevaluated the bedside.  Her daughter is at the bedside.  She reports feeling better.  Imaging shows no evidence of stones or stranding.  There is evidence of bilateral inguinal hernias containing fat and atherosclerotic disease.  Patient received a dose of Rocephin here in the ED.  She understand she does not need to start antibiotics until tomorrow.  Additionally, she will be discharged home with potassium oral replacement.  This is something she has dealt with in the past.  She looks improved.  She feels better.  At this time, I think she can safely be discharged to home.  She is given discharge instructions and anticipatory guidance.  She is advised that for any reason, she is unable to take her antibiotics she should return for reevaluation.  She is discharged in stable condition.    Hydration: Based on the patient's presentation of Acute Kidney Injury and Dehydration the patient was given IV fluids. IV Hydration was used because oral hydration was not adequate alone. Upon recheck following hydration, the patient was improved.    ADDITIONAL PROBLEMS MANAGED    DISPOSITION AND DISCUSSIONS  I have discussed management of the patient with the following physicians and JEAN-PIERRE's:  None    Discussion of management with other QHP or appropriate source(s):  None     Escalation of care considered, and ultimately not performed:acute  inpatient care management, however at this time, the patient is most appropriate for outpatient management    Barriers to care at this time, including but not limited to: None.     Decision tools and prescription drugs considered including, but not limited to: None.    FINAL DIAGNOSIS  1. Flank pain    2. Pyelonephritis    3. Hypokalemia         Electronically signed by: Lou Marie D.O., 3/5/2025 7:49 PM

## 2025-03-07 LAB
BACTERIA UR CULT: ABNORMAL
BACTERIA UR CULT: ABNORMAL
SIGNIFICANT IND 70042: ABNORMAL
SITE SITE: ABNORMAL
SOURCE SOURCE: ABNORMAL

## 2025-03-08 NOTE — ED NOTES
ED Positive Culture Follow-up/Notification Note:    Date: 3/8/2025     Patient seen in the ED on 3/5/2025 for right flank pain for the past 4 days.   Patient reports history of kidney infections and kidney stones.  Patient reports urinary frequency, but denies dysuria, fevers, chills, or nausea.  CT renal showed no evidence of hydronephrosis.  Patient received ceftriaxone 1 g IV x1 dose in the ED.    1. Flank pain    2. Pyelonephritis    3. Hypokalemia       Discharge Medication List as of 3/5/2025 10:21 PM        START taking these medications    Details   sulfamethoxazole-trimethoprim (BACTRIM DS) 800-160 MG tablet Take 1 Tablet by mouth 2 times a day for 7 days., Disp-14 Tablet, R-0, Normal      potassium chloride SA (KDUR) 20 MEQ Tab CR Take 1 Tablet by mouth every day for 10 days., Disp-10 Tablet, R-0, Normal             Allergies: Morphine     Vitals:    03/05/25 2002 03/05/25 2003 03/05/25 2201 03/05/25 2221   BP:  (!) 177/95 (!) 143/70    Pulse:  79 82    Resp: 20   16   Temp:    36.7 °C (98 °F)   TempSrc:    Temporal   SpO2:  89% 92%    Weight:       Height:           Final cultures:   Results       Procedure Component Value Units Date/Time    URINE CULTURE(NEW) [[id number]]  (Abnormal)  (Susceptibility) Collected: 03/05/25 2000    Order Status: Completed Specimen: Urine Updated: 03/07/25 1842     Significant Indicator POS     Source UR     Site -     Culture Result -      Escherichia coli  >100,000 cfu/mL      Susceptibility       Escherichia coli (1)       Antibiotic Interpretation Microscan   Method Status    Ampicillin/sulbactam Sensitive <=4/2 mcg/mL JULIO Final    Amikacin  [*]  Sensitive <=16 mcg/mL JULIO Final    Ampicillin Sensitive <=8 mcg/mL JULIO Final    Amoxicillin/Clavulanic Acid Sensitive <=8/4 mcg/mL JULIO Final    Aztreonam  [*]  Sensitive <=4 mcg/mL JULIO Final    Ceftolozane+Tazobactam  [*]  Sensitive <=2 mcg/mL JULIO Final    Ceftriaxone Sensitive <=1 mcg/mL JULIO Final    Ceftazidime  [*]  Sensitive  <=1 mcg/mL JULIO Final    Cefazolin Sensitive <=2 mcg/mL JULIO Final     Breakpoints when Cefazolin is used for therapy of infections  other than uncomplicated UTIs due to Enterobacterales are as  follows:  JULIO and Interpretation:  <=2 S  4 I  >=8 R         Ciprofloxacin Sensitive <=0.25 mcg/mL JULIO Final    Cefepime Sensitive <=2 mcg/mL JULIO Final    Cefuroxime Sensitive <=4 mcg/mL JULIO Final    Ceftazidime+Avibactam  [*]  Sensitive <=4 mcg/mL JULIO Final    Ertapenem  [*]  Sensitive <=0.5 mcg/mL JULIO Final    Nitrofurantoin Sensitive <=32 mcg/mL JULIO Final    Gentamicin Sensitive <=2 mcg/mL JULIO Final    Imipenem  [*]  Sensitive <=1 mcg/mL JULIO Final    Levofloxacin Sensitive <=0.5 mcg/mL JULIO Final    Meropenem Sensitive <=1 mcg/mL JULIO Final    Meropenem/Vaborbactam Sensitive <=2 mcg/mL JULIO Final    Minocycline Sensitive <=4 mcg/mL JULIO Final    Pip/Tazobactam Sensitive <=8 mcg/mL JULIO Final    Trimeth/Sulfa Sensitive <=0.5/9.5 mcg/mL JULIO Final    Tetracycline  [*]  Sensitive <=4 mcg/mL JULIO Final    Tigecycline Sensitive <=2 mcg/mL JULIO Final    Tobramycin Sensitive <=2 mcg/mL JULIO Final               [*]  Suppressed Antibiotic                   URINALYSIS,CULTURE IF INDICATED [[id number]]  (Abnormal) Collected: 03/05/25 1930    Order Status: Completed Specimen: Urine Updated: 03/05/25 2029     Color Yellow     Character Cloudy     Specific Gravity 1.022     Ph 5.5     Glucose Negative mg/dL      Ketones Trace mg/dL      Protein 100 mg/dL      Bilirubin Negative     Urobilinogen, Urine 1.0 EU/dL      Nitrite Positive     Leukocyte Esterase Moderate     Occult Blood Large     Micro Urine Req Microscopic            Plan:   Urine culture is positive for e coli.  Appropriate antibiotic therapy prescribed. No changes required based upon culture result.  Sent letter to patient to notify of positive culture result and encourage compliance with prescribed antibiotics.     Francisco Valdivia, PharmD

## 2025-04-09 NOTE — ASSESSMENT & PLAN NOTE
Urine drug screen positive for amphetamines, cocaine, cannabis.  Contributing to her hypertension   -3

## 2025-04-10 ENCOUNTER — TELEPHONE (OUTPATIENT)
Dept: CARDIOLOGY | Facility: MEDICAL CENTER | Age: 55
End: 2025-04-10
Payer: COMMERCIAL

## 2025-04-10 NOTE — LETTER
PROCEDURE/SURGERY CLEARANCE FORM      Encounter Date: 4/10/2025    Patient: Heide Rangel  YOB: 1970    CARDIOLOGIST: JAMES Russ.     REFERRING DOCTOR:  No ref. provider found    Procedure/surgery: open bilateral inguinal hernia                                           PROCEDURE/SURGERY CLEARANCE FORM    Date: 4/14/2025   Patient Name: Heide Rangel    Dear Surgeon or Proceduralist,      Thank you for your request for cardiac stratification of our mutual patient Heide Rangel 1970. We have reviewed their [facility] records; and to the best of our understanding this patient has not had stenting, ablation, watchman, cardiothoracic surgery or hospitalization for cardiovascular reasons in the past 6 months.  Heide Rangel has been seen within the past 15 months and is considered to have non-modifiable cardiac risk for this low-risk procedure/surgery. They may proceed from a cardiovascular standpoint and may hold their antiplatelet/anticoagulation as briefly as possible. Please have patient resume this medication when hemodynamically stable to do so.     Aspirin or Prasugrel   - hold 7 days prior to procedure/surgery, resume when hemodynamically stable      Clopidrogrel or Ticagrelor  - hold 7 days for all neurological procedures, hold 5 days prior to all other procedure/surgery,  resume when hemodynamically stable     Warfarin - hold 7 days for all neurological procedures, hold 5 days prior to all other procedure/surgery and coordinate with [facility] ([phone]) INR testing and dose management.      Pradaxa/Xarelto/Eliquis/Savesya - hold 1 day prior to procedure for low bleeding risk procedure, 2 days for high bleeding risk procedure, or consider holding 3 days or longer for patients with reduced kidney function (CrCl <30mL/min) or spinal/cranial surgeries/procedures.      If they have a mechanical heart valve, please coordinate with  [facility] ([phone]) the proper management of their anticoagulant in the periprocedural or perioperative period.      Some patients have higher risk for cardiovascular complications or holding medication. If our patient has had prior complications of holding antiplatelet or anticoagulants in the past and we have seen them after these events, we have addressed these concerns with the patient. They are at an unknown degree of increased risk for recurrent complication.  You may hold anticoagulation/antiplatelets for the procedure or surgery if the benefits of the procedure or surgery outweigh this nonmodifiable risk.      If Heide Rangel 1970 has new symptoms of heart failure decompensation, unstable arrythmia, or angina please reach out and we will assess the patient.      If you have other patient-specific concerns, please feel free to reach out to the patient's cardiologist directly at [phone].     Thank you,       [facility]      Electronically signed        MD Signature   JAMES Russ.

## 2025-04-14 NOTE — TELEPHONE ENCOUNTER
Last OV: 02.28.2025  Proposed Surgery: open bilateral inguinal hernia   Surgery Date: TBD  Requesting Office Name: [facility]   Fax Number: [phone]  Preference of Location (default is surgery center unless specified by Cardiologist or JEAN-PIERRE)  Prior Clearance Addressed: No    Is this a general clearance? YES   Anticoags/Antiplatelets: Other none  Anticoags/Antiplatelet managed by Cardiology?    Outstanding Cardiac Imaging : No  Ablation, Cardioversion, Stent, Cardiac Devices, Catheterization, Watchman: No  TAVR/Valve, Mitral Clip, Watchman (including open heart),: N/A   Recent Cardiac Hospitalization: No            When: N/A  History (cardiac history):   Past Medical History:   Diagnosis Date    COPD (chronic obstructive pulmonary disease) (HCC)     Hypertension     Kidney disease     Substance abuse (HCC)            Is this a dental clearance? NO  Ablation, Cardioversion, Watchman, Stents, Cath, Devices within the last 3 months? No   If yes- Send dental wait letter, do not forward to provider for review.     TAVR / Valve, Mitral clip within the last 6 months? No  If yes- Send dental wait letter, do not forward to provider for review.     If completing a general clearance, continue per protocol.           Surgical Clearance Letter Sent: YES   **Scan clearance request letter into [email or web address].**